# Patient Record
Sex: MALE | Race: WHITE | NOT HISPANIC OR LATINO | Employment: OTHER | URBAN - METROPOLITAN AREA
[De-identification: names, ages, dates, MRNs, and addresses within clinical notes are randomized per-mention and may not be internally consistent; named-entity substitution may affect disease eponyms.]

---

## 2023-02-17 PROBLEM — R55 SYNCOPE AND COLLAPSE: Status: ACTIVE | Noted: 2023-02-17

## 2023-02-17 PROBLEM — I49.5 SINUS NODE DYSFUNCTION (MULTI): Status: ACTIVE | Noted: 2023-02-17

## 2023-02-17 PROBLEM — I11.9 CARDIOMYOPATHY, HYPERTENSIVE (MULTI): Status: ACTIVE | Noted: 2023-02-17

## 2023-02-17 PROBLEM — I77.9 PERIPHERAL ARTERIAL OCCLUSIVE DISEASE (CMS-HCC): Status: ACTIVE | Noted: 2023-02-17

## 2023-02-17 PROBLEM — E78.1 PURE HYPERGLYCERIDEMIA: Status: ACTIVE | Noted: 2023-02-17

## 2023-02-17 PROBLEM — E03.9 HYPOTHYROIDISM: Status: ACTIVE | Noted: 2023-02-17

## 2023-02-17 PROBLEM — I43 CARDIOMYOPATHY, HYPERTENSIVE (MULTI): Status: ACTIVE | Noted: 2023-02-17

## 2023-02-17 PROBLEM — Z95.818 STATUS POST PLACEMENT OF IMPLANTABLE LOOP RECORDER: Status: ACTIVE | Noted: 2023-02-17

## 2023-02-17 PROBLEM — G62.9 PERIPHERAL NEUROPATHY: Status: ACTIVE | Noted: 2023-02-17

## 2023-02-17 PROBLEM — R16.1 SPLENOMEGALY: Status: ACTIVE | Noted: 2023-02-17

## 2023-02-17 PROBLEM — I10 ESSENTIAL HYPERTENSION: Status: ACTIVE | Noted: 2023-02-17

## 2023-02-17 PROBLEM — K21.9 ACID REFLUX: Status: ACTIVE | Noted: 2023-02-17

## 2023-02-17 PROBLEM — N28.9 RENAL INSUFFICIENCY: Status: ACTIVE | Noted: 2023-02-17

## 2023-02-17 PROBLEM — E11.9 TYPE 2 DIABETES MELLITUS WITHOUT COMPLICATION, WITHOUT LONG-TERM CURRENT USE OF INSULIN (MULTI): Status: ACTIVE | Noted: 2023-02-17

## 2023-02-17 PROBLEM — N32.81 OVERACTIVE BLADDER: Status: ACTIVE | Noted: 2023-02-17

## 2023-02-17 PROBLEM — R26.81 GAIT INSTABILITY: Status: ACTIVE | Noted: 2023-02-17

## 2023-02-17 PROBLEM — N40.0 PROSTATE ENLARGEMENT: Status: ACTIVE | Noted: 2023-02-17

## 2023-02-17 PROBLEM — I65.23 BILATERAL CAROTID ARTERY STENOSIS: Status: ACTIVE | Noted: 2023-02-17

## 2023-02-17 PROBLEM — F32.4 MAJOR DEPRESSIVE DISORDER WITH SINGLE EPISODE, IN PARTIAL REMISSION (CMS-HCC): Status: ACTIVE | Noted: 2023-02-17

## 2023-02-17 PROBLEM — I25.5 ISCHEMIC CARDIOMYOPATHY: Status: ACTIVE | Noted: 2023-02-17

## 2023-02-17 PROBLEM — I47.20 VENTRICULAR TACHYCARDIA (MULTI): Status: ACTIVE | Noted: 2023-02-17

## 2023-02-17 PROBLEM — F41.9 ANXIETY: Status: ACTIVE | Noted: 2023-02-17

## 2023-02-17 RX ORDER — TAMSULOSIN HYDROCHLORIDE 0.4 MG/1
1 CAPSULE ORAL DAILY
COMMUNITY
Start: 2018-10-09 | End: 2023-04-19

## 2023-02-17 RX ORDER — ATORVASTATIN CALCIUM 40 MG/1
1 TABLET, FILM COATED ORAL NIGHTLY
COMMUNITY
Start: 2018-10-23

## 2023-02-17 RX ORDER — CLOPIDOGREL BISULFATE 75 MG/1
1 TABLET ORAL DAILY
COMMUNITY
Start: 2018-09-19 | End: 2023-12-06

## 2023-02-17 RX ORDER — ESOMEPRAZOLE MAGNESIUM 40 MG/1
1 CAPSULE, DELAYED RELEASE ORAL DAILY PRN
COMMUNITY
Start: 2019-07-09 | End: 2023-04-19

## 2023-02-17 RX ORDER — CILOSTAZOL 50 MG/1
1 TABLET ORAL 2 TIMES DAILY
COMMUNITY
Start: 2021-05-12 | End: 2023-06-01

## 2023-02-17 RX ORDER — LISINOPRIL 5 MG/1
1 TABLET ORAL DAILY
COMMUNITY
Start: 2018-09-11 | End: 2023-04-03

## 2023-02-17 RX ORDER — LEVOTHYROXINE SODIUM 50 UG/1
1 TABLET ORAL DAILY
COMMUNITY
Start: 2018-09-11 | End: 2023-06-01

## 2023-02-17 RX ORDER — TOLTERODINE 4 MG/1
1 CAPSULE, EXTENDED RELEASE ORAL DAILY
COMMUNITY
Start: 2018-10-23 | End: 2023-04-10 | Stop reason: WASHOUT

## 2023-02-17 RX ORDER — FUROSEMIDE 20 MG/1
1 TABLET ORAL DAILY
COMMUNITY
Start: 2019-02-12 | End: 2023-04-10 | Stop reason: DRUGHIGH

## 2023-02-17 RX ORDER — METFORMIN HYDROCHLORIDE 500 MG/1
1 TABLET, FILM COATED, EXTENDED RELEASE ORAL DAILY
COMMUNITY
Start: 2022-09-29 | End: 2023-04-10 | Stop reason: WASHOUT

## 2023-02-17 RX ORDER — BLOOD SUGAR DIAGNOSTIC
STRIP MISCELLANEOUS 2 TIMES DAILY
COMMUNITY
Start: 2019-11-21

## 2023-02-17 RX ORDER — CARVEDILOL 3.12 MG/1
1 TABLET ORAL 2 TIMES DAILY
COMMUNITY
Start: 2019-03-13 | End: 2023-11-21 | Stop reason: SDUPTHER

## 2023-02-17 RX ORDER — CITALOPRAM 20 MG/1
1 TABLET, FILM COATED ORAL DAILY
COMMUNITY
Start: 2019-06-04 | End: 2023-06-01

## 2023-03-31 DIAGNOSIS — I10 ESSENTIAL HYPERTENSION: ICD-10-CM

## 2023-04-03 RX ORDER — LISINOPRIL 5 MG/1
TABLET ORAL
Qty: 90 TABLET | Refills: 3 | Status: SHIPPED | OUTPATIENT
Start: 2023-04-03 | End: 2023-12-06

## 2023-04-03 NOTE — TELEPHONE ENCOUNTER
Rx Refill Request     Name: Cb ALLEY Villa  :  [id number]   Medication Name:  Lisinopril 5 mg   Specific Pharmacy location:  [facility]   Date of last appointment:  01/10/2023  Date of next appointment:  4/10/2023   Best number to reach patient:  [phone]

## 2023-04-10 ENCOUNTER — OFFICE VISIT (OUTPATIENT)
Dept: PRIMARY CARE | Facility: CLINIC | Age: 88
End: 2023-04-10
Payer: COMMERCIAL

## 2023-04-10 VITALS
WEIGHT: 207.8 LBS | BODY MASS INDEX: 31.49 KG/M2 | OXYGEN SATURATION: 98 % | RESPIRATION RATE: 16 BRPM | DIASTOLIC BLOOD PRESSURE: 88 MMHG | HEIGHT: 68 IN | SYSTOLIC BLOOD PRESSURE: 130 MMHG | TEMPERATURE: 97.6 F | HEART RATE: 56 BPM

## 2023-04-10 DIAGNOSIS — I10 ESSENTIAL HYPERTENSION: ICD-10-CM

## 2023-04-10 DIAGNOSIS — F41.9 ANXIETY: ICD-10-CM

## 2023-04-10 DIAGNOSIS — E03.9 HYPOTHYROIDISM, UNSPECIFIED TYPE: ICD-10-CM

## 2023-04-10 DIAGNOSIS — F32.4 MAJOR DEPRESSIVE DISORDER WITH SINGLE EPISODE, IN PARTIAL REMISSION (CMS-HCC): ICD-10-CM

## 2023-04-10 DIAGNOSIS — K21.9 GASTROESOPHAGEAL REFLUX DISEASE WITHOUT ESOPHAGITIS: ICD-10-CM

## 2023-04-10 DIAGNOSIS — E11.9 TYPE 2 DIABETES MELLITUS WITHOUT COMPLICATION, WITHOUT LONG-TERM CURRENT USE OF INSULIN (MULTI): ICD-10-CM

## 2023-04-10 DIAGNOSIS — R60.9 PERIPHERAL EDEMA: ICD-10-CM

## 2023-04-10 DIAGNOSIS — Z00.00 ENCOUNTER FOR SUBSEQUENT ANNUAL WELLNESS VISIT (AWV) IN MEDICARE PATIENT: Primary | ICD-10-CM

## 2023-04-10 PROBLEM — R60.0 PERIPHERAL EDEMA: Status: ACTIVE | Noted: 2023-04-10

## 2023-04-10 PROCEDURE — 99214 OFFICE O/P EST MOD 30 MIN: CPT | Performed by: FAMILY MEDICINE

## 2023-04-10 PROCEDURE — 3075F SYST BP GE 130 - 139MM HG: CPT | Performed by: FAMILY MEDICINE

## 2023-04-10 PROCEDURE — G0439 PPPS, SUBSEQ VISIT: HCPCS | Performed by: FAMILY MEDICINE

## 2023-04-10 PROCEDURE — 1160F RVW MEDS BY RX/DR IN RCRD: CPT | Performed by: FAMILY MEDICINE

## 2023-04-10 PROCEDURE — 1159F MED LIST DOCD IN RCRD: CPT | Performed by: FAMILY MEDICINE

## 2023-04-10 PROCEDURE — 1170F FXNL STATUS ASSESSED: CPT | Performed by: FAMILY MEDICINE

## 2023-04-10 PROCEDURE — 3079F DIAST BP 80-89 MM HG: CPT | Performed by: FAMILY MEDICINE

## 2023-04-10 RX ORDER — ASPIRIN 81 MG/1
81 TABLET ORAL ONCE
COMMUNITY
Start: 2007-07-16 | End: 2023-04-10 | Stop reason: WASHOUT

## 2023-04-10 RX ORDER — FUROSEMIDE 40 MG/1
40 TABLET ORAL DAILY
Qty: 30 TABLET | Refills: 1 | Status: SHIPPED | OUTPATIENT
Start: 2023-04-10 | End: 2023-05-10 | Stop reason: SDUPTHER

## 2023-04-10 RX ORDER — OXYBUTYNIN CHLORIDE 10 MG/1
1 TABLET, EXTENDED RELEASE ORAL DAILY
COMMUNITY
Start: 2023-01-11 | End: 2023-06-01

## 2023-04-10 RX ORDER — POTASSIUM CHLORIDE 600 MG/1
8 TABLET, FILM COATED, EXTENDED RELEASE ORAL DAILY
Qty: 30 TABLET | Refills: 1 | Status: SHIPPED | OUTPATIENT
Start: 2023-04-10 | End: 2023-05-02

## 2023-04-10 ASSESSMENT — PATIENT HEALTH QUESTIONNAIRE - PHQ9
1. LITTLE INTEREST OR PLEASURE IN DOING THINGS: NOT AT ALL
SUM OF ALL RESPONSES TO PHQ9 QUESTIONS 1 AND 2: 0
2. FEELING DOWN, DEPRESSED OR HOPELESS: NOT AT ALL

## 2023-04-10 ASSESSMENT — ACTIVITIES OF DAILY LIVING (ADL)
DRESSING: INDEPENDENT
BATHING: INDEPENDENT
MANAGING_FINANCES: NEEDS ASSISTANCE
TAKING_MEDICATION: INDEPENDENT
DOING_HOUSEWORK: TOTAL CARE
GROCERY_SHOPPING: NEEDS ASSISTANCE

## 2023-04-10 ASSESSMENT — ENCOUNTER SYMPTOMS
HYPERTENSION: 1
OCCASIONAL FEELINGS OF UNSTEADINESS: 1
DEPRESSION: 0
LOSS OF SENSATION IN FEET: 1

## 2023-04-10 NOTE — PROGRESS NOTES
Subjective   Reason for Visit: Cb Driver is an 89 y.o. male here for a Medicare Wellness visit.     Past Medical, Surgical, and Family History reviewed and updated in chart.    Reviewed all medications by prescribing practitioner or clinical pharmacist (such as prescriptions, OTCs, herbal therapies and supplements) and documented in the medical record.    The patient's living will is unknown at this present time.   The patient's current code status is FULL CODE, does not want to linger on with machines.       Diabetes    Hypertension    GERD        Diabetes Mellitus:  Reports taking medication as advised and denies side effects. The patient's is checking sugars and reports values to be in expected ranges. Checking blood sugar routinely and denies any hypoglycemic events since last visit.     last eye exam: 2022  last foot exam: last week, Dr. Amador, Podiatry    Hypertension: reports no side effects to prescribed medication. The patient reports good compliance with the medication. The patient is trying to follow a low-salt diet.    Hypothyroidism: This patient is here today for review of hypothyroidism. Lab work is reviewed. The patient is taking thyroid replacement hormone without side effects.    Anxiety: Patient reports taking medication as prescribed and not excessively. Denies any side effects. Patient is reporting stability in condition.    GERD: The patient is not reporting any significant heartburn or indigestion. The patient is not getting any symptoms at night. Symptoms are stable so long as medications are taken.    Patient complains of lower extremity edema. He has had a few falls in past 3 weeks. He utilizes a walker for ambulation, he states that his right knee and right leg give out on him, which cause him to fall.     The patient denies SOB, dizziness, headaches, nausea, vomiting, constipation.    Patient Care Team:  Juan Jose Dempsey MD as PCP - General  Amanda Baez MD as PCP -  "[employer] PCP         Objective   Vitals:  /88   Pulse 56   Temp 36.4 °C (97.6 °F)   Resp 16   Ht 1.727 m (5' 8\")   Wt 94.3 kg (207 lb 12.8 oz)   SpO2 98%   BMI 31.60 kg/m²       Physical Exam  Vitals reviewed.   Constitutional:       Appearance: Normal appearance. He is obese.   Neck:      Vascular: No carotid bruit.   Cardiovascular:      Rate and Rhythm: Normal rate and regular rhythm.      Pulses: Normal pulses.      Heart sounds: Normal heart sounds.   Pulmonary:      Effort: Pulmonary effort is normal. No respiratory distress.      Breath sounds: Normal breath sounds. No wheezing.   Abdominal:      General: There is no distension.      Palpations: Abdomen is soft. There is no mass.      Tenderness: There is no abdominal tenderness. There is no right CVA tenderness, left CVA tenderness, guarding or rebound.   Musculoskeletal:      Cervical back: Normal range of motion and neck supple. No rigidity.      Right lower leg: Edema present.      Left lower leg: Edema present.   Lymphadenopathy:      Cervical: No cervical adenopathy.   Neurological:      Mental Status: He is alert.         Labs reviewed from:  Below are the patient's most recent values for Albumin, ALT, AST, BUN, Calcium, Chloride, Cholesterol, CO2, Creatinine, GFR, Glucose, HDL, Hematocrit, Hemoglobin, Hemoglobin A1C, LDL, Magnesium, Phosphorus, Platelets, Potassium, PSA, Sodium, Triglycerides, and WBC.   Lab Results   Component Value Date    ALBUMIN 3.1 (L) 03/06/2023    ALT 7 (L) 03/06/2023    AST 10 03/06/2023    BUN 30 (H) 03/06/2023    CALCIUM 8.3 (L) 03/06/2023     (H) 03/06/2023    CHOL 111 01/19/2023    CO2 22 03/06/2023    CREATININE 1.77 (H) 03/06/2023    HDL 52.3 01/19/2023    HCT 34.6 (L) 03/06/2023    HGB 11.4 (L) 03/06/2023    HGBA1C 6.2 (A) 01/19/2023    MG 1.80 11/10/2019    PHOS 2.9 11/10/2019     03/06/2023    K 4.1 03/06/2023     03/06/2023    TRIG 110 01/19/2023    WBC 5.7 03/06/2023 "         Assessment/Plan   Problem List Items Addressed This Visit          Circulatory    Essential hypertension    Current Assessment & Plan      Is well controlled, continue with current medications.              Digestive    Acid reflux    Current Assessment & Plan      Is well controlled, continue with current medications.              Musculoskeletal    Peripheral edema    Relevant Medications    furosemide (Lasix) 40 mg tablet    potassium chloride CR (Klor-Con) 8 mEq ER tablet       Endocrine/Metabolic    Hypothyroidism    Current Assessment & Plan      Is well controlled, continue with current medications.          Type 2 diabetes mellitus without complication, without long-term current use of insulin (CMS/[facility])    Current Assessment & Plan      Is stable, continue with current treatment.             Other    Anxiety    Current Assessment & Plan      Is well controlled, continue with current medications.           Major depressive disorder with single episode, in partial remission (CMS/[facility])    Current Assessment & Plan      Is stable, continue with current treatment.          Encounter for subsequent annual wellness visit (AWV) in Medicare patient - Primary    Current Assessment & Plan     Medicare wellness visit done, patient is in satisfactory living circumstances where the patient's needs are met.  This patient is advised to develop or update their living will and provide us a copy for the chart.             Follow up in:  1 month with labs day of     Mile Attestation  By signing my name below, IKarlie Scribe   attest that this documentation has been prepared under the direction and in the presence of Juan Jose Dempsey MD.

## 2023-04-10 NOTE — ASSESSMENT & PLAN NOTE
Medicare wellness visit done, patient is in satisfactory living circumstances where the patient's needs are met.  This patient is advised to develop or update their living will and provide us a copy for the chart.

## 2023-04-10 NOTE — PROGRESS NOTES
Subjective   Reason for Visit: Cb Dirver is an 89 y.o. male here for a Medicare Wellness visit.     Past Medical, Surgical, and Family History reviewed and updated in chart.    Reviewed all medications by prescribing practitioner or clinical pharmacist (such as prescriptions, OTCs, herbal therapies and supplements) and documented in the medical record.    The patient's living will is unknown at this present time.   The patient's current code status is FULL CODE, does not want to linger on with machines.       HPI    Diabetes Mellitus:  Reports taking medication as advised and denies side effects. The patient's is checking sugars and reports values to be in expected ranges. Checking blood sugar routinely and denies any hypoglycemic events since last visit.     last eye exam: 2022  last foot exam: last week, Dr. Amador, Podiatry    Hypertension: reports no side effects to prescribed medication. The patient reports good compliance with the medication. The patient is trying to follow a low-salt diet.    Hypothyroidism: This patient is here today for review of hypothyroidism. Lab work is reviewed. The patient is taking thyroid replacement hormone without side effects.    Anxiety: Patient reports taking medication as prescribed and not excessively. Denies any side effects. Patient is reporting stability in condition.    GERD: The patient is not reporting any significant heartburn or indigestion. The patient is not getting any symptoms at night. Symptoms are stable so long as medications are taken.    Patient complains of lower extremity edema. He has had a few falls in past 3 weeks. He utilizes a walker for ambulation, he states that his right knee and right leg give out on him, which cause him to fall.     The patient denies SOB, dizziness, headaches, nausea, vomiting, constipation.    Patient Care Team:  Juan Jose Dempsey MD as PCP - General  Amanda Baez MD as PCP - [employer] PCP  "        Objective   Vitals:  /88   Pulse 56   Temp 36.4 °C (97.6 °F)   Resp 16   Ht 1.727 m (5' 8\")   Wt 94.3 kg (207 lb 12.8 oz)   SpO2 98%   BMI 31.60 kg/m²       Physical Exam  Vitals reviewed.   Constitutional:       Appearance: Normal appearance. He is obese.   Neck:      Vascular: No carotid bruit.   Cardiovascular:      Rate and Rhythm: Normal rate and regular rhythm.      Pulses: Normal pulses.      Heart sounds: Normal heart sounds.   Pulmonary:      Effort: Pulmonary effort is normal. No respiratory distress.      Breath sounds: Normal breath sounds. No wheezing.   Abdominal:      General: There is no distension.      Palpations: Abdomen is soft. There is no mass.      Tenderness: There is no abdominal tenderness. There is no right CVA tenderness, left CVA tenderness, guarding or rebound.   Musculoskeletal:      Cervical back: Normal range of motion and neck supple. No rigidity.      Right lower leg: Edema present.      Left lower leg: Edema present.   Lymphadenopathy:      Cervical: No cervical adenopathy.   Neurological:      Mental Status: He is alert.         Labs reviewed from:  Below are the patient's most recent values for Albumin, ALT, AST, BUN, Calcium, Chloride, Cholesterol, CO2, Creatinine, GFR, Glucose, HDL, Hematocrit, Hemoglobin, Hemoglobin A1C, LDL, Magnesium, Phosphorus, Platelets, Potassium, PSA, Sodium, Triglycerides, and WBC.   Lab Results   Component Value Date    ALBUMIN 3.1 (L) 03/06/2023    ALT 7 (L) 03/06/2023    AST 10 03/06/2023    BUN 30 (H) 03/06/2023    CALCIUM 8.3 (L) 03/06/2023     (H) 03/06/2023    CHOL 111 01/19/2023    CO2 22 03/06/2023    CREATININE 1.77 (H) 03/06/2023    HDL 52.3 01/19/2023    HCT 34.6 (L) 03/06/2023    HGB 11.4 (L) 03/06/2023    HGBA1C 6.2 (A) 01/19/2023    MG 1.80 11/10/2019    PHOS 2.9 11/10/2019     03/06/2023    K 4.1 03/06/2023     03/06/2023    TRIG 110 01/19/2023    WBC 5.7 03/06/2023         Assessment/Plan "   Problem List Items Addressed This Visit          Circulatory    Essential hypertension    Current Assessment & Plan      Is well controlled, continue with current medications.              Digestive    Acid reflux    Current Assessment & Plan      Is well controlled, continue with current medications.              Endocrine/Metabolic    Hypothyroidism    Current Assessment & Plan      Is well controlled, continue with current medications.          Type 2 diabetes mellitus without complication, without long-term current use of insulin (CMS/[facility])    Current Assessment & Plan      Is stable, continue with current treatment.             Other    Anxiety    Current Assessment & Plan      Is well controlled, continue with current medications.           Major depressive disorder with single episode, in partial remission (CMS/[facility])    Current Assessment & Plan      Is stable, continue with current treatment.          Encounter for subsequent annual wellness visit (AWV) in Medicare patient - Primary    Current Assessment & Plan     Medicare wellness visit done, patient is in satisfactory living circumstances where the patient's needs are met.  This patient is advised to develop or update their living will and provide us a copy for the chart.             Follow up in:  1 month with labs day of     Scribe Attestation  By signing my name below, IKarlie Scribe   attest that this documentation has been prepared under the direction and in the presence of Juan Jose Dempsey MD.

## 2023-04-10 NOTE — PROGRESS NOTES
"Subjective   Reason for Visit: Cb Driver is an 89 y.o. male here for a Medicare Wellness visit.  He would like to be resuscitated but he does not want to live a vegetative state on a machine.    Past Medical, Surgical, and Family History reviewed and updated in chart.    Reviewed all medications by prescribing practitioner or clinical pharmacist (such as prescriptions, OTCs, herbal therapies and supplements) and documented in the medical record.   Diabetes Mellitus: Reports taking medication as advised and denies side effects. The patient's is checking sugars and reports values to be in expected ranges. Checking blood sugar routinely and denies any hypoglycemic events since last visit.   Hypertension, reports no side effects to prescribed medication. The patient reports good compliance with the medication. The patient is trying to follow a low-salt diet.  Hyperlipidemia: Reports taking medication as advised and without side effects.  The patient is reporting no leg cramping in particular. The patient is trying to follow a low-fat diet.  Hypothyroidism: This patient is here today for review of hypothyroidism. Lab work is reviewed. The patient is taking thyroid replacement hormone without side effects.  Anxiety: Reports taking medication as needed but not excessively and denies side effects. The patient is not reporting any high levels of stress or difficulty sleeping. The patient is not having any panic attacks. The patient is able to function in activities of daily life.  Gastroesophageal reflux disease: This patient is not reporting any significant heartburn or indigestion. The patient is not getting symptoms to the night. Symptoms are stable so long as medications are taken.  Patient Care Team:  Juan Jose Dempsey MD as PCP - [facility]  Amanda Baez MD as PCP - [employer] PCP       Objective   Vitals:  /88   Pulse 56   Temp 36.4 °C (97.6 °F)   Resp 16   Ht 1.727 m (5' 8\")   " Wt 94.3 kg (207 lb 12.8 oz)   SpO2 98%   BMI 31.60 kg/m²       Physical Exam  Vitals reviewed.   Constitutional:       Appearance: Normal appearance.   Neck:      Vascular: No carotid bruit.   Cardiovascular:      Rate and Rhythm: Normal rate and regular rhythm.      Pulses: Normal pulses.      Heart sounds: Normal heart sounds.   Pulmonary:      Effort: Pulmonary effort is normal. No respiratory distress.      Breath sounds: Normal breath sounds. No wheezing.   Abdominal:      General: There is no distension.      Palpations: Abdomen is soft. There is no mass.      Tenderness: There is no abdominal tenderness. There is no right CVA tenderness, left CVA tenderness, guarding or rebound.   Musculoskeletal:      Cervical back: Normal range of motion and neck supple. No rigidity.      Right lower leg: Edema present.      Left lower leg: Edema present.   Lymphadenopathy:      Cervical: No cervical adenopathy.   Neurological:      Mental Status: He is alert.     No recent labs to review.    Assessment/Plan   Problem List Items Addressed This Visit          Circulatory    Essential hypertension    Current Assessment & Plan      Is well controlled, continue with current medications.              Digestive    Acid reflux    Current Assessment & Plan      Is well controlled, continue with current medications.              Endocrine/Metabolic    Hypothyroidism    Current Assessment & Plan      Is well controlled, continue with current medications.          Type 2 diabetes mellitus without complication, without long-term current use of insulin (CMS/[facility])    Current Assessment & Plan      Is stable, continue with current treatment.             Other    Anxiety    Current Assessment & Plan      Is well controlled, continue with current medications.           Major depressive disorder with single episode, in partial remission (CMS/[facility])    Current Assessment & Plan      Is stable, continue with current treatment.           Encounter for subsequent annual wellness visit (AWV) in Medicare patient - Primary    Current Assessment & Plan     Medicare wellness visit done, patient is in satisfactory living circumstances where the patient's needs are met.  This patient is advised to develop or update their living will and provide us a copy for the chart.           Other Visit Diagnoses       Peripheral edema        Relevant Medications    furosemide (Lasix) 40 mg tablet    potassium chloride CR (Klor-Con) 8 mEq ER tablet    Routine general medical examination at health care facility

## 2023-04-19 DIAGNOSIS — K21.9 GASTROESOPHAGEAL REFLUX DISEASE WITHOUT ESOPHAGITIS: ICD-10-CM

## 2023-04-19 DIAGNOSIS — N32.81 OVERACTIVE BLADDER: ICD-10-CM

## 2023-04-19 DIAGNOSIS — N40.0 PROSTATE ENLARGEMENT: ICD-10-CM

## 2023-04-19 RX ORDER — TAMSULOSIN HYDROCHLORIDE 0.4 MG/1
CAPSULE ORAL
Qty: 90 CAPSULE | Refills: 1 | Status: SHIPPED | OUTPATIENT
Start: 2023-04-19 | End: 2023-11-02

## 2023-04-19 RX ORDER — TOLTERODINE 4 MG/1
CAPSULE, EXTENDED RELEASE ORAL
Qty: 90 CAPSULE | Refills: 1 | Status: SHIPPED | OUTPATIENT
Start: 2023-04-19

## 2023-04-19 RX ORDER — ESOMEPRAZOLE MAGNESIUM 40 MG/1
CAPSULE, DELAYED RELEASE ORAL
Qty: 90 CAPSULE | Refills: 1 | Status: SHIPPED | OUTPATIENT
Start: 2023-04-19 | End: 2023-10-04

## 2023-04-20 ENCOUNTER — TELEPHONE (OUTPATIENT)
Dept: PRIMARY CARE | Facility: CLINIC | Age: 88
End: 2023-04-20

## 2023-04-20 NOTE — TELEPHONE ENCOUNTER
Silver with [facility] called for pt requesting verbal orders for skilled nursing pt and ot. Stated aid of care reported swelling in bilateral lower extremities. Please advise. Contact info for silver is [phone]

## 2023-04-29 DIAGNOSIS — R60.9 PERIPHERAL EDEMA: ICD-10-CM

## 2023-05-02 RX ORDER — POTASSIUM CHLORIDE 600 MG/1
CAPSULE, EXTENDED RELEASE ORAL
Qty: 30 CAPSULE | Refills: 1 | Status: SHIPPED | OUTPATIENT
Start: 2023-05-02 | End: 2023-05-10 | Stop reason: SDUPTHER

## 2023-05-02 NOTE — TELEPHONE ENCOUNTER
Rx Refill Request     Name: Cb Driver    Date of last appointment:  4/10/2023   Date of next appointment:  5/10/2023   Best number to reach patient:  [phone]

## 2023-05-10 ENCOUNTER — OFFICE VISIT (OUTPATIENT)
Dept: PRIMARY CARE | Facility: CLINIC | Age: 88
End: 2023-05-10
Payer: COMMERCIAL

## 2023-05-10 VITALS
TEMPERATURE: 97.8 F | BODY MASS INDEX: 30.65 KG/M2 | DIASTOLIC BLOOD PRESSURE: 44 MMHG | WEIGHT: 202.2 LBS | HEIGHT: 68 IN | OXYGEN SATURATION: 98 % | HEART RATE: 78 BPM | RESPIRATION RATE: 14 BRPM | SYSTOLIC BLOOD PRESSURE: 124 MMHG

## 2023-05-10 DIAGNOSIS — M25.561 ARTHRALGIA OF RIGHT KNEE: ICD-10-CM

## 2023-05-10 DIAGNOSIS — R60.0 BILATERAL LEG EDEMA: Primary | ICD-10-CM

## 2023-05-10 DIAGNOSIS — R60.9 PERIPHERAL EDEMA: ICD-10-CM

## 2023-05-10 LAB
ALBUMIN (G/DL) IN SER/PLAS: 3.5 G/DL (ref 3.4–5)
ANION GAP IN SER/PLAS: 11 MMOL/L (ref 10–20)
CALCIUM (MG/DL) IN SER/PLAS: 8.7 MG/DL (ref 8.6–10.3)
CARBON DIOXIDE, TOTAL (MMOL/L) IN SER/PLAS: 25 MMOL/L (ref 21–32)
CHLORIDE (MMOL/L) IN SER/PLAS: 108 MMOL/L (ref 98–107)
CREATININE (MG/DL) IN SER/PLAS: 1.88 MG/DL (ref 0.5–1.3)
GFR MALE: 33 ML/MIN/1.73M2
GLUCOSE (MG/DL) IN SER/PLAS: 125 MG/DL (ref 74–99)
PHOSPHATE (MG/DL) IN SER/PLAS: 3.3 MG/DL (ref 2.5–4.9)
POTASSIUM (MMOL/L) IN SER/PLAS: 4.2 MMOL/L (ref 3.5–5.3)
SODIUM (MMOL/L) IN SER/PLAS: 140 MMOL/L (ref 136–145)
UREA NITROGEN (MG/DL) IN SER/PLAS: 37 MG/DL (ref 6–23)

## 2023-05-10 PROCEDURE — 3078F DIAST BP <80 MM HG: CPT | Performed by: FAMILY MEDICINE

## 2023-05-10 PROCEDURE — 3074F SYST BP LT 130 MM HG: CPT | Performed by: FAMILY MEDICINE

## 2023-05-10 PROCEDURE — 99213 OFFICE O/P EST LOW 20 MIN: CPT | Performed by: FAMILY MEDICINE

## 2023-05-10 PROCEDURE — 1160F RVW MEDS BY RX/DR IN RCRD: CPT | Performed by: FAMILY MEDICINE

## 2023-05-10 PROCEDURE — 1159F MED LIST DOCD IN RCRD: CPT | Performed by: FAMILY MEDICINE

## 2023-05-10 RX ORDER — DICLOFENAC SODIUM 10 MG/G
4 GEL TOPICAL 4 TIMES DAILY PRN
Qty: 120 G | Refills: 1 | Status: SHIPPED | OUTPATIENT
Start: 2023-05-10 | End: 2023-06-01

## 2023-05-10 RX ORDER — POTASSIUM CHLORIDE 600 MG/1
8 CAPSULE, EXTENDED RELEASE ORAL DAILY
Qty: 30 CAPSULE | Refills: 1 | Status: SHIPPED | OUTPATIENT
Start: 2023-05-10 | End: 2023-08-22

## 2023-05-10 RX ORDER — FUROSEMIDE 40 MG/1
40 TABLET ORAL DAILY
Qty: 30 TABLET | Refills: 1 | Status: SHIPPED | OUTPATIENT
Start: 2023-05-10

## 2023-05-10 NOTE — PROGRESS NOTES
"Subjective   Patient ID:  Cb Driver is a 89 y.o. male patient who presents today for Leg Swelling    Past Medical, Surgical, and Family History reviewed and updated in chart.     Reviewed all medications by prescribing practitioner or clinical pharmacist (such as prescriptions, OTCs, herbal therapies and supplements) and documented in the medical record.     Bilateral Leg Edema  At last appointment patient complains of swelling in his legs. He was put on medication and it did not help. He was seen by cardiologist Dr. Gaines and the medication was increased and it still has not helped. Patient notes his right leg is more edematous.     He has been trying OTC gels for arthritis in his right knee.    The patient denies having the following symptoms: chest pain, chest pressure, fever, chills, N/V/D, constipation, dizziness, headaches, SOB.        Patient Care Team:  Juan Jose Dempsey MD as PCP - General  Amanda Baez MD as PCP - [employer] PCP        Objective   Vitals:  BP (!) 124/44   Pulse 78   Temp 36.6 °C (97.8 °F)   Resp 14   Ht 1.727 m (5' 8\")   Wt 91.7 kg (202 lb 3.2 oz)   SpO2 98%   BMI 30.74 kg/m²     Labs reviewed from:   Below is the patient's most recent value for Albumin, ALT, AST, BUN, Calcium, Chloride, Cholesterol, CO2, Creatinine, GFR, Glucose, HDL, Hematocrit, Hemoglobin, Hemoglobin A1C, LDL, Magnesium, Phosphorus, Platelets, Potassium, PSA, Sodium, Triglycerides, and WBC.   Lab Results   Component Value Date    ALBUMIN 3.1 (L) 03/06/2023    ALT 7 (L) 03/06/2023    AST 10 03/06/2023    BUN 30 (H) 03/06/2023    CALCIUM 8.3 (L) 03/06/2023     (H) 03/06/2023    CHOL 111 01/19/2023    CO2 22 03/06/2023    CREATININE 1.77 (H) 03/06/2023    HDL 52.3 01/19/2023    HCT 34.6 (L) 03/06/2023    HGB 11.4 (L) 03/06/2023    HGBA1C 6.2 (A) 01/19/2023    MG 1.80 11/10/2019    PHOS 2.9 11/10/2019     03/06/2023    K 4.1 03/06/2023     03/06/2023    TRIG 110 " 01/19/2023    WBC 5.7 03/06/2023         Physical Exam  Vitals reviewed.   Constitutional:       Appearance: Normal appearance.   HENT:      Right Ear: Tympanic membrane and ear canal normal.      Left Ear: Tympanic membrane and ear canal normal.      Mouth/Throat:      Pharynx: Oropharynx is clear.   Eyes:      Extraocular Movements: Extraocular movements intact.      Conjunctiva/sclera: Conjunctivae normal.      Pupils: Pupils are equal, round, and reactive to light.   Cardiovascular:      Rate and Rhythm: Normal rate and regular rhythm.      Heart sounds: Normal heart sounds.   Pulmonary:      Effort: Pulmonary effort is normal. No respiratory distress.      Breath sounds: Normal breath sounds.   Abdominal:      General: There is no distension.      Palpations: There is no mass.      Tenderness: There is no abdominal tenderness. There is no guarding or rebound.      Hernia: No hernia is present.   Musculoskeletal:      Cervical back: Neck supple.      Comments: Right knee tenderness in the joint lines. Slight swelling noted. Bilateral edema of extremities.   Skin:     General: Skin is warm and dry.   Neurological:      Mental Status: He is alert and oriented to person, place, and time.                 Assessment/Plan   Problem List Items Addressed This Visit       Bilateral leg edema - Primary    Current Assessment & Plan      This condition is poorly controlled, therapeutic changes necessary.                  Follow up in: 2 months without labs prior.      Scribe Attestation  By signing my name below, Carlee GUTIERREZ , Mile   attest that this documentation has been prepared under the direction and in the presence of Juan Jose Dempsey MD.

## 2023-05-23 LAB
ALBUMIN (G/DL) IN SER/PLAS: 3.5 G/DL (ref 3.4–5)
ANION GAP IN SER/PLAS: 13 MMOL/L (ref 10–20)
CALCIUM (MG/DL) IN SER/PLAS: 8.9 MG/DL (ref 8.6–10.3)
CARBON DIOXIDE, TOTAL (MMOL/L) IN SER/PLAS: 21 MMOL/L (ref 21–32)
CHLORIDE (MMOL/L) IN SER/PLAS: 107 MMOL/L (ref 98–107)
CREATININE (MG/DL) IN SER/PLAS: 2.53 MG/DL (ref 0.5–1.3)
GFR MALE: 23 ML/MIN/1.73M2
GLUCOSE (MG/DL) IN SER/PLAS: 78 MG/DL (ref 74–99)
PHOSPHATE (MG/DL) IN SER/PLAS: 3.3 MG/DL (ref 2.5–4.9)
POTASSIUM (MMOL/L) IN SER/PLAS: 4.5 MMOL/L (ref 3.5–5.3)
SODIUM (MMOL/L) IN SER/PLAS: 136 MMOL/L (ref 136–145)
UREA NITROGEN (MG/DL) IN SER/PLAS: 70 MG/DL (ref 6–23)

## 2023-06-01 DIAGNOSIS — M25.561 ARTHRALGIA OF RIGHT KNEE: ICD-10-CM

## 2023-06-01 RX ORDER — DICLOFENAC SODIUM 10 MG/G
GEL TOPICAL
Qty: 120 G | Refills: 1 | Status: SHIPPED | OUTPATIENT
Start: 2023-06-01

## 2023-07-12 ENCOUNTER — APPOINTMENT (OUTPATIENT)
Dept: PRIMARY CARE | Facility: CLINIC | Age: 88
End: 2023-07-12
Payer: COMMERCIAL

## 2023-07-20 ENCOUNTER — TELEPHONE (OUTPATIENT)
Dept: PRIMARY CARE | Facility: CLINIC | Age: 88
End: 2023-07-20

## 2023-07-20 ENCOUNTER — LAB (OUTPATIENT)
Dept: LAB | Facility: LAB | Age: 88
End: 2023-07-20
Payer: COMMERCIAL

## 2023-07-20 ENCOUNTER — OFFICE VISIT (OUTPATIENT)
Dept: PRIMARY CARE | Facility: CLINIC | Age: 88
End: 2023-07-20
Payer: COMMERCIAL

## 2023-07-20 VITALS
BODY MASS INDEX: 29.55 KG/M2 | HEART RATE: 63 BPM | HEIGHT: 68 IN | WEIGHT: 195 LBS | SYSTOLIC BLOOD PRESSURE: 100 MMHG | OXYGEN SATURATION: 93 % | TEMPERATURE: 97.8 F | RESPIRATION RATE: 18 BRPM | DIASTOLIC BLOOD PRESSURE: 60 MMHG

## 2023-07-20 DIAGNOSIS — E11.9 TYPE 2 DIABETES MELLITUS WITHOUT COMPLICATION, WITHOUT LONG-TERM CURRENT USE OF INSULIN (MULTI): ICD-10-CM

## 2023-07-20 DIAGNOSIS — C68.0 MALIGNANT NEOPLASM OF URETHRA (MULTI): Primary | ICD-10-CM

## 2023-07-20 DIAGNOSIS — R26.81 GAIT INSTABILITY: ICD-10-CM

## 2023-07-20 DIAGNOSIS — R60.0 BILATERAL LEG EDEMA: ICD-10-CM

## 2023-07-20 DIAGNOSIS — I10 ESSENTIAL HYPERTENSION: ICD-10-CM

## 2023-07-20 DIAGNOSIS — I77.9 PERIPHERAL ARTERIAL OCCLUSIVE DISEASE (CMS-HCC): ICD-10-CM

## 2023-07-20 DIAGNOSIS — E78.1 PURE HYPERGLYCERIDEMIA: ICD-10-CM

## 2023-07-20 DIAGNOSIS — R29.898 WEAKNESS OF EXTREMITY: ICD-10-CM

## 2023-07-20 DIAGNOSIS — M25.561 ARTHRALGIA OF RIGHT KNEE: ICD-10-CM

## 2023-07-20 DIAGNOSIS — I49.5 SINUS NODE DYSFUNCTION (MULTI): ICD-10-CM

## 2023-07-20 LAB
ALANINE AMINOTRANSFERASE (SGPT) (U/L) IN SER/PLAS: 9 U/L (ref 10–52)
ALBUMIN (G/DL) IN SER/PLAS: 3.5 G/DL (ref 3.4–5)
ALKALINE PHOSPHATASE (U/L) IN SER/PLAS: 149 U/L (ref 33–136)
ANION GAP IN SER/PLAS: 11 MMOL/L (ref 10–20)
ASPARTATE AMINOTRANSFERASE (SGOT) (U/L) IN SER/PLAS: 11 U/L (ref 9–39)
BASOPHILS (10*3/UL) IN BLOOD BY AUTOMATED COUNT: 0.04 X10E9/L (ref 0–0.1)
BASOPHILS/100 LEUKOCYTES IN BLOOD BY AUTOMATED COUNT: 0.8 % (ref 0–2)
BILIRUBIN TOTAL (MG/DL) IN SER/PLAS: 1.2 MG/DL (ref 0–1.2)
CALCIUM (MG/DL) IN SER/PLAS: 8.9 MG/DL (ref 8.6–10.3)
CARBON DIOXIDE, TOTAL (MMOL/L) IN SER/PLAS: 20 MMOL/L (ref 21–32)
CHLORIDE (MMOL/L) IN SER/PLAS: 112 MMOL/L (ref 98–107)
CHOLESTEROL (MG/DL) IN SER/PLAS: 93 MG/DL (ref 0–199)
CHOLESTEROL IN HDL (MG/DL) IN SER/PLAS: 39.4 MG/DL
CHOLESTEROL/HDL RATIO: 2.4
CREATININE (MG/DL) IN SER/PLAS: 1.54 MG/DL (ref 0.5–1.3)
EOSINOPHILS (10*3/UL) IN BLOOD BY AUTOMATED COUNT: 0.24 X10E9/L (ref 0–0.4)
EOSINOPHILS/100 LEUKOCYTES IN BLOOD BY AUTOMATED COUNT: 5.1 % (ref 0–6)
ERYTHROCYTE DISTRIBUTION WIDTH (RATIO) BY AUTOMATED COUNT: 15.5 % (ref 11.5–14.5)
ERYTHROCYTE MEAN CORPUSCULAR HEMOGLOBIN CONCENTRATION (G/DL) BY AUTOMATED: 32.7 G/DL (ref 32–36)
ERYTHROCYTE MEAN CORPUSCULAR VOLUME (FL) BY AUTOMATED COUNT: 93 FL (ref 80–100)
ERYTHROCYTES (10*6/UL) IN BLOOD BY AUTOMATED COUNT: 4.12 X10E12/L (ref 4.5–5.9)
ESTIMATED AVERAGE GLUCOSE FOR HBA1C: 140 MG/DL
GFR MALE: 42 ML/MIN/1.73M2
GLUCOSE (MG/DL) IN SER/PLAS: 101 MG/DL (ref 74–99)
HEMATOCRIT (%) IN BLOOD BY AUTOMATED COUNT: 38.2 % (ref 41–52)
HEMOGLOBIN (G/DL) IN BLOOD: 12.5 G/DL (ref 13.5–17.5)
HEMOGLOBIN A1C/HEMOGLOBIN TOTAL IN BLOOD: 6.5 %
IMMATURE GRANULOCYTES/100 LEUKOCYTES IN BLOOD BY AUTOMATED COUNT: 0.2 % (ref 0–0.9)
LDL: 44 MG/DL (ref 0–99)
LEUKOCYTES (10*3/UL) IN BLOOD BY AUTOMATED COUNT: 4.8 X10E9/L (ref 4.4–11.3)
LYMPHOCYTES (10*3/UL) IN BLOOD BY AUTOMATED COUNT: 1.78 X10E9/L (ref 0.8–3)
LYMPHOCYTES/100 LEUKOCYTES IN BLOOD BY AUTOMATED COUNT: 37.5 % (ref 13–44)
MONOCYTES (10*3/UL) IN BLOOD BY AUTOMATED COUNT: 0.27 X10E9/L (ref 0.05–0.8)
MONOCYTES/100 LEUKOCYTES IN BLOOD BY AUTOMATED COUNT: 5.7 % (ref 2–10)
NEUTROPHILS (10*3/UL) IN BLOOD BY AUTOMATED COUNT: 2.41 X10E9/L (ref 1.6–5.5)
NEUTROPHILS/100 LEUKOCYTES IN BLOOD BY AUTOMATED COUNT: 50.7 % (ref 40–80)
PLATELETS (10*3/UL) IN BLOOD AUTOMATED COUNT: 156 X10E9/L (ref 150–450)
POTASSIUM (MMOL/L) IN SER/PLAS: 4.2 MMOL/L (ref 3.5–5.3)
PROTEIN TOTAL: 5.8 G/DL (ref 6.4–8.2)
SODIUM (MMOL/L) IN SER/PLAS: 139 MMOL/L (ref 136–145)
TRIGLYCERIDE (MG/DL) IN SER/PLAS: 46 MG/DL (ref 0–149)
UREA NITROGEN (MG/DL) IN SER/PLAS: 28 MG/DL (ref 6–23)
VLDL: 9 MG/DL (ref 0–40)

## 2023-07-20 PROCEDURE — 85025 COMPLETE CBC W/AUTO DIFF WBC: CPT

## 2023-07-20 PROCEDURE — 80061 LIPID PANEL: CPT

## 2023-07-20 PROCEDURE — 3074F SYST BP LT 130 MM HG: CPT | Performed by: FAMILY MEDICINE

## 2023-07-20 PROCEDURE — 99214 OFFICE O/P EST MOD 30 MIN: CPT | Performed by: FAMILY MEDICINE

## 2023-07-20 PROCEDURE — 36415 COLL VENOUS BLD VENIPUNCTURE: CPT

## 2023-07-20 PROCEDURE — 83036 HEMOGLOBIN GLYCOSYLATED A1C: CPT

## 2023-07-20 PROCEDURE — 3078F DIAST BP <80 MM HG: CPT | Performed by: FAMILY MEDICINE

## 2023-07-20 PROCEDURE — 1159F MED LIST DOCD IN RCRD: CPT | Performed by: FAMILY MEDICINE

## 2023-07-20 PROCEDURE — 1160F RVW MEDS BY RX/DR IN RCRD: CPT | Performed by: FAMILY MEDICINE

## 2023-07-20 PROCEDURE — 80053 COMPREHEN METABOLIC PANEL: CPT

## 2023-07-20 NOTE — PROGRESS NOTES
"Subjective   Patient ID:  Cb Driver is a 89 y.o. male patient who presents today for Edema    Diarrhea despite no metformin.  Patient is not complaining of symptoms of high or low blood sugars.  Hypertension, reports no side effects to prescribed medication. The patient reports good compliance with the medication. The patient is trying to follow a low-salt diet.  This patient does have some peripheral arterial disease although he is relatively asymptomatic.  He is not as active as previously he was.  Hypertriglyceridemia: Reports taking medication as advised and without side effects.  The patient is reporting no leg cramping in particular. The patient is trying to follow a low-fat diet.  This patient did suffer from sinus node dysfunction but that has been stabilized.   Diabetes Mellitus: Reports taking medication as advised and denies side effects. The patient's is checking sugars and reports values to be in expected ranges. Checking blood sugar routinely and denies any hypoglycemic events since last visit.     Past Medical, Surgical, and Family History reviewed and updated in chart.     Reviewed all medications by prescribing practitioner or clinical pharmacist (such as prescriptions, OTCs, herbal therapies and supplements) and documented in the medical record.     No Known Allergies    The patient denies having the following symptoms: chest pain, chest pressure, fever, chills, N/V/D, constipation, dizziness, headaches, SOB.    Patient Care Team:  Juan Jose Dempsey MD as PCP - General  Juan Jose Dempsey MD as PCP - [employer] PCP    Objective   Vitals:  /60 (BP Location: Right arm, Patient Position: Sitting, BP Cuff Size: Large adult)   Pulse 63   Temp 36.6 °C (97.8 °F)   Resp 18   Ht 1.727 m (5' 8\")   Wt 88.5 kg (195 lb)   SpO2 93%   BMI 29.65 kg/m²     Physical Exam  Vitals reviewed.   Constitutional:       Appearance: Normal appearance.   Neck:      Vascular: No carotid bruit. "   Cardiovascular:      Rate and Rhythm: Normal rate and regular rhythm.      Heart sounds: Normal heart sounds.      Comments: Pulses diminished in the legs.  Pulmonary:      Effort: Pulmonary effort is normal. No respiratory distress.      Breath sounds: Normal breath sounds. No wheezing.   Abdominal:      General: There is no distension.      Palpations: Abdomen is soft. There is no mass.      Tenderness: There is no abdominal tenderness. There is no right CVA tenderness, left CVA tenderness, guarding or rebound.   Musculoskeletal:      Cervical back: Normal range of motion and neck supple. No rigidity.      Right lower leg: No edema.      Left lower leg: No edema.   Lymphadenopathy:      Cervical: No cervical adenopathy.   Neurological:      Mental Status: He is alert.     Labs ordered for 7/20/2023:   CMP, CBC, Lipid, HgA1C.    Assessment/Plan   Problem List Items Addressed This Visit       Essential hypertension    Current Assessment & Plan      Is well controlled, continue with current medications.         Relevant Orders    CBC and Auto Differential (Completed)    Comprehensive Metabolic Panel (Completed)    Gait instability    Peripheral arterial occlusive disease (CMS/HCC)    Current Assessment & Plan      Is stable, continue with current treatment.         Pure hyperglyceridemia    Current Assessment & Plan     Is clinically stable so we will continue with current medications and lab work to confirm status ordered.         Relevant Orders    Lipid Panel (Completed)    Type 2 diabetes mellitus without complication, without long-term current use of insulin (CMS/HCC)    Current Assessment & Plan     Is clinically stable so we will continue with current medications and lab work to confirm status ordered.         Relevant Orders    Hemoglobin A1C (Completed)    Referral to Gastroenterology    Sinus node dysfunction (CMS/HCC)    Current Assessment & Plan     Stable, continue to monitor.         Bilateral leg  edema    Current Assessment & Plan      Is stable, continue with current treatment.  This patient is given furosemide and potassium with the instructions to check their legs in the evening.  If the legs are swollen in the evening then the following morning the patient would need to take furosemide and if the patient takes furosemide the patient needs to take potassium.  If the legs are not swollen at bedtime then furosemide is not needed the next morning and if furosemide is not taken then potassium is not taken.         Malignant neoplasm of urethra (CMS/HCC) - Primary    Current Assessment & Plan     Stable with tumor in remission, no further investigations currently needed.          Other Visit Diagnoses       Arthralgia of right knee        Weakness of extremity              Patient is in need of a power mobility device due to chronic conditions such as gait instability, cardiomyopathy, bilateral leg edema, Urethra cancer, general upper and lower extremity weakness, right knee pain, history of syncope, and general debility that puts him at high risk for falls and injury. Use of a cane, walker, or manual wheelchair is not appropriate due to these conditions. Patient is able to operate the tiller of a power device but would be to weak to operate a manual chair.     This patient will follow-up in 3 months with lab work prior. Other follow-up will be as needed.

## 2023-07-20 NOTE — TELEPHONE ENCOUNTER
PATIENTS FRIEND CALLED STATING DR. LUCAS WAS SUPPOSED TO SEND SOMETHING OVER TO THE PHARMACY FOR DIARRHEA. PATIENT WAS SEEN TODAY AND IT WAS DISCUSSED IN HIS VISIT.     [facility]

## 2023-07-24 NOTE — ASSESSMENT & PLAN NOTE
Is stable, continue with current treatment.  This patient is given furosemide and potassium with the instructions to check their legs in the evening.  If the legs are swollen in the evening then the following morning the patient would need to take furosemide and if the patient takes furosemide the patient needs to take potassium.  If the legs are not swollen at bedtime then furosemide is not needed the next morning and if furosemide is not taken then potassium is not taken.

## 2023-08-01 NOTE — TELEPHONE ENCOUNTER
Spoke to parvez and he stated patient is still having diarrhea. Patient saw Dr. Kwon earlier this week and was told to eliminate dairy products.   Please advise

## 2023-08-04 ENCOUNTER — TELEPHONE (OUTPATIENT)
Dept: PRIMARY CARE | Facility: CLINIC | Age: 88
End: 2023-08-04
Payer: COMMERCIAL

## 2023-08-04 NOTE — TELEPHONE ENCOUNTER
Patients friend called requesting an order for a motorized scooter/wheelchair. He stated patient is having a hard time moving around. Lopez stated he will call back when he knows where to send the order to.   Please advise

## 2023-08-22 DIAGNOSIS — R60.9 PERIPHERAL EDEMA: ICD-10-CM

## 2023-08-22 RX ORDER — POTASSIUM CHLORIDE 600 MG/1
8 CAPSULE, EXTENDED RELEASE ORAL DAILY
Qty: 30 CAPSULE | Refills: 1 | Status: SHIPPED | OUTPATIENT
Start: 2023-08-22 | End: 2023-10-04

## 2023-08-22 NOTE — TELEPHONE ENCOUNTER
Recent Visits  Date Type Provider Dept   07/20/23 Office Visit Juan Jose Dempsey MD Do [id number] [facility]   05/10/23 Office Visit Juan Jose Dempsey MD Do [id number] [facility]   04/10/23 Office Visit Juan Jose Dempsey MD Do [id number] [facility]   Showing recent visits within past 540 days and meeting all other requirements  Future Appointments  Date Type Provider Dept   10/23/23 Appointment Juan Jose Dempsey MD Do [id number] [facility]   Showing future appointments within next 180 days and meeting all other requirements

## 2023-10-02 ENCOUNTER — TELEPHONE (OUTPATIENT)
Dept: PRIMARY CARE | Facility: CLINIC | Age: 88
End: 2023-10-02
Payer: COMMERCIAL

## 2023-10-02 NOTE — TELEPHONE ENCOUNTER
The patient is requesting an order for an electric wheelchair. He states someone came to his home to measure his door and entryways.   He want to know what company the doctor ordered his chair through.  Also, when will he get the chair?  Unfortunately, I could not answer that question nor could I locate that information inside his chart.  He has lost their business card.    Please reach out to the patient for any further clarification.

## 2023-10-03 DIAGNOSIS — R60.9 PERIPHERAL EDEMA: ICD-10-CM

## 2023-10-03 DIAGNOSIS — K21.9 GASTROESOPHAGEAL REFLUX DISEASE WITHOUT ESOPHAGITIS: ICD-10-CM

## 2023-10-04 RX ORDER — POTASSIUM CHLORIDE 600 MG/1
8 CAPSULE, EXTENDED RELEASE ORAL DAILY
Qty: 30 CAPSULE | Refills: 1 | Status: SHIPPED | OUTPATIENT
Start: 2023-10-04 | End: 2023-12-04

## 2023-10-04 RX ORDER — ESOMEPRAZOLE MAGNESIUM 40 MG/1
CAPSULE, DELAYED RELEASE ORAL
Qty: 90 CAPSULE | Refills: 1 | Status: SHIPPED | OUTPATIENT
Start: 2023-10-04 | End: 2024-01-08

## 2023-10-04 NOTE — TELEPHONE ENCOUNTER
Recent Visits  Date Type Provider Dept   07/20/23 Office Visit Juan Jose Dempsey MD Do [id number] [facility]   05/10/23 Office Visit Juan Jose Dempsey MD Do [id number] [facility]   04/10/23 Office Visit Juan Jose Dempsey MD Do [id number] [facility]   Showing recent visits within past 540 days and meeting all other requirements  Future Appointments  Date Type Provider Dept   10/23/23 Appointment Juan Jose Dempsey MD Do [id number] [facility]   Showing future appointments within next 180 days and meeting all other requirements

## 2023-10-22 PROBLEM — R19.7 DIARRHEA: Status: ACTIVE | Noted: 2023-10-22

## 2023-10-22 PROBLEM — I11.9 CARDIOMYOPATHY, HYPERTENSIVE (MULTI): Status: RESOLVED | Noted: 2023-02-17 | Resolved: 2023-10-22

## 2023-10-22 PROBLEM — M19.90 ARTHRITIS: Status: ACTIVE | Noted: 2023-10-22

## 2023-10-22 PROBLEM — I43 CARDIOMYOPATHY, HYPERTENSIVE (MULTI): Status: RESOLVED | Noted: 2023-02-17 | Resolved: 2023-10-22

## 2023-10-22 PROBLEM — I25.811 CORONARY ARTERY DISEASE INVOLVING NATIVE ARTERY OF TRANSPLANTED HEART WITHOUT ANGINA PECTORIS: Status: ACTIVE | Noted: 2023-10-22

## 2023-10-22 PROBLEM — R15.9 INCONTINENCE OF FECES: Status: ACTIVE | Noted: 2023-10-22

## 2023-10-22 NOTE — PROGRESS NOTES
"Subjective    Patient ID: Cb Driver is a 89 y.o. male who presents for Hypertension, Hyperlipidemia, Diabetes, Hypothyroidism, Chronic Kidney Disease, and Knee Pain.     Hypertension: The patient is here for follow-up of elevated blood pressure. He is adherent to a low salt diet. Blood pressure is well controlled at home. No new myalgias or GI upset on diet control.    Hyperlipidemia: The patient is present today for a follow up of hyperlipidemia. He denies having any leg cramping in particular. He is trying to follow a low-fat diet.     Diabetes Mellitus with CKD, stage 3B: The patient presents today for a follow up of DM with CKD, stage 3B. This patient has stage III B renal insufficiency which has been stable. Patient is encouraged to continue water intake. Patient is encouraged to remain very well hydrated. A1C 6.5; GFR 42.     Hypothyroidism: Patient presents for a follow up of their thyroid function. Energy wise that patient is well.     Knee Pain: Patient presents today for a follow up of  with knee pain involving the  right knee.   Right knee pain that goes down to his foot. His foot and ankle are swollen. Patient has arthritis. Patient has been taking OTC Tylenol to help alleviate the pain. Will continue to monitor. He is in the process of getting a scooter to help with ambulation.     Dementia: The patient is present today for a follow up of dementia. Patient is stable at this current time. Patient is accompanied by family member for HPI history.       Pt received flu vaccine today.    The patient denies having the following symptoms: chest pain, chest pressure, fever, chills, N/V/D, constipation, dizziness, headaches, SOB.    Objective   Vitals:  /62   Pulse 50   Temp 36.2 °C (97.1 °F)   Resp 12   Ht 1.702 m (5' 7\")   Wt 87.6 kg (193 lb 3.2 oz)   SpO2 95%   BMI 30.26 kg/m²     Physical Exam  Vitals reviewed.   Constitutional:       Appearance: Normal appearance. He is obese.   Neck:      " Vascular: No carotid bruit.   Cardiovascular:      Rate and Rhythm: Normal rate and regular rhythm.      Pulses: Normal pulses.      Heart sounds: Normal heart sounds.   Pulmonary:      Effort: Pulmonary effort is normal. No respiratory distress.      Breath sounds: Normal breath sounds. No wheezing.   Abdominal:      General: There is no distension.      Palpations: Abdomen is soft. There is no mass.      Tenderness: There is no abdominal tenderness. There is no right CVA tenderness, left CVA tenderness, guarding or rebound.   Musculoskeletal:      Cervical back: Normal range of motion and neck supple. No rigidity.      Right lower leg: Edema (right ankle) present.      Left lower leg: No edema.   Lymphadenopathy:      Cervical: No cervical adenopathy.   Neurological:      Mental Status: He is alert.        Labs active- future.     Assessment/Plan   Problem List Items Addressed This Visit       Primary hypertension - Primary      Is stable, continue with current treatment.           Relevant Orders    CBC and Auto Differential    Comprehensive Metabolic Panel    Hypothyroidism     Is clinically stable so we will continue with current medications and lab work to confirm status ordered.           Relevant Orders    Tsh With Reflex To Free T4 If Abnormal    Mixed hyperlipidemia     Is clinically stable so we will continue with current medications and lab work to confirm status ordered.           Relevant Orders    Lipid Panel    Type 2 diabetes mellitus without complication, without long-term current use of insulin (CMS/[facility])     Is clinically stable so we will continue with current medications and lab work to confirm status ordered.           Relevant Orders    Hemoglobin A1C    Albumin , Urine Random    Follow Up In [facility] - PCP - Medicare Annual    Dementia (CMS/[facility])      Is stable, continue with current treatment.           Need for immunization against influenza    Relevant Orders    Flu vaccine,  quadrivalent, high-dose, preservative free, age 65y+ (FLUZONE)       Follow up in: 4 month(s) or sooner if needed with labs today.     Scribe Attestation  By signing my name below, I, Mile Nesbitt   attest that this documentation has been prepared under the direction and in the presence of Juan Jose Dempsey MD.

## 2023-10-23 ENCOUNTER — LAB (OUTPATIENT)
Dept: LAB | Facility: LAB | Age: 88
End: 2023-10-23
Payer: COMMERCIAL

## 2023-10-23 ENCOUNTER — OFFICE VISIT (OUTPATIENT)
Dept: PRIMARY CARE | Facility: CLINIC | Age: 88
End: 2023-10-23
Payer: COMMERCIAL

## 2023-10-23 VITALS
SYSTOLIC BLOOD PRESSURE: 124 MMHG | HEIGHT: 67 IN | RESPIRATION RATE: 12 BRPM | DIASTOLIC BLOOD PRESSURE: 62 MMHG | HEART RATE: 50 BPM | TEMPERATURE: 97.1 F | OXYGEN SATURATION: 95 % | BODY MASS INDEX: 30.32 KG/M2 | WEIGHT: 193.2 LBS

## 2023-10-23 DIAGNOSIS — E78.2 MIXED HYPERLIPIDEMIA: ICD-10-CM

## 2023-10-23 DIAGNOSIS — E03.9 ACQUIRED HYPOTHYROIDISM: ICD-10-CM

## 2023-10-23 DIAGNOSIS — I10 PRIMARY HYPERTENSION: ICD-10-CM

## 2023-10-23 DIAGNOSIS — Z23 NEED FOR IMMUNIZATION AGAINST INFLUENZA: ICD-10-CM

## 2023-10-23 DIAGNOSIS — F03.90 DEMENTIA WITHOUT BEHAVIORAL DISTURBANCE, PSYCHOTIC DISTURBANCE, MOOD DISTURBANCE, OR ANXIETY, UNSPECIFIED DEMENTIA SEVERITY, UNSPECIFIED DEMENTIA TYPE (MULTI): ICD-10-CM

## 2023-10-23 DIAGNOSIS — E11.9 TYPE 2 DIABETES MELLITUS WITHOUT COMPLICATION, WITHOUT LONG-TERM CURRENT USE OF INSULIN (MULTI): ICD-10-CM

## 2023-10-23 DIAGNOSIS — I10 PRIMARY HYPERTENSION: Primary | ICD-10-CM

## 2023-10-23 PROBLEM — N28.9 RENAL INSUFFICIENCY: Status: RESOLVED | Noted: 2023-02-17 | Resolved: 2023-10-23

## 2023-10-23 LAB
ALBUMIN SERPL BCP-MCNC: 3.5 G/DL (ref 3.4–5)
ALP SERPL-CCNC: 140 U/L (ref 33–136)
ALT SERPL W P-5'-P-CCNC: 7 U/L (ref 10–52)
ANION GAP SERPL CALC-SCNC: 11 MMOL/L (ref 10–20)
AST SERPL W P-5'-P-CCNC: 10 U/L (ref 9–39)
BASOPHILS # BLD AUTO: 0.03 X10*3/UL (ref 0–0.1)
BASOPHILS NFR BLD AUTO: 0.6 %
BILIRUB SERPL-MCNC: 1 MG/DL (ref 0–1.2)
BUN SERPL-MCNC: 44 MG/DL (ref 6–23)
CALCIUM SERPL-MCNC: 8.3 MG/DL (ref 8.6–10.3)
CHLORIDE SERPL-SCNC: 108 MMOL/L (ref 98–107)
CHOLEST SERPL-MCNC: 90 MG/DL (ref 0–199)
CHOLESTEROL/HDL RATIO: 2.3
CO2 SERPL-SCNC: 22 MMOL/L (ref 21–32)
CREAT SERPL-MCNC: 2.1 MG/DL (ref 0.5–1.3)
CREAT UR-MCNC: 34.9 MG/DL (ref 20–370)
EOSINOPHIL # BLD AUTO: 0.15 X10*3/UL (ref 0–0.4)
EOSINOPHIL NFR BLD AUTO: 3 %
ERYTHROCYTE [DISTWIDTH] IN BLOOD BY AUTOMATED COUNT: 14.1 % (ref 11.5–14.5)
GFR SERPL CREATININE-BSD FRML MDRD: 29 ML/MIN/1.73M*2
GLUCOSE SERPL-MCNC: 106 MG/DL (ref 74–99)
HCT VFR BLD AUTO: 38.3 % (ref 41–52)
HDLC SERPL-MCNC: 39.6 MG/DL
HGB BLD-MCNC: 12.1 G/DL (ref 13.5–17.5)
IMM GRANULOCYTES # BLD AUTO: 0.01 X10*3/UL (ref 0–0.5)
IMM GRANULOCYTES NFR BLD AUTO: 0.2 % (ref 0–0.9)
LDLC SERPL CALC-MCNC: 42 MG/DL
LYMPHOCYTES # BLD AUTO: 1.91 X10*3/UL (ref 0.8–3)
LYMPHOCYTES NFR BLD AUTO: 38.4 %
MCH RBC QN AUTO: 31.3 PG (ref 26–34)
MCHC RBC AUTO-ENTMCNC: 31.6 G/DL (ref 32–36)
MCV RBC AUTO: 99 FL (ref 80–100)
MICROALBUMIN UR-MCNC: 9 MG/L
MICROALBUMIN/CREAT UR: 25.8 UG/MG CREAT
MONOCYTES # BLD AUTO: 0.36 X10*3/UL (ref 0.05–0.8)
MONOCYTES NFR BLD AUTO: 7.2 %
NEUTROPHILS # BLD AUTO: 2.52 X10*3/UL (ref 1.6–5.5)
NEUTROPHILS NFR BLD AUTO: 50.6 %
NON HDL CHOLESTEROL: 50 MG/DL (ref 0–149)
NRBC BLD-RTO: 0 /100 WBCS (ref 0–0)
PLATELET # BLD AUTO: 147 X10*3/UL (ref 150–450)
PMV BLD AUTO: 12.6 FL (ref 7.5–11.5)
POTASSIUM SERPL-SCNC: 4 MMOL/L (ref 3.5–5.3)
PROT SERPL-MCNC: 5.7 G/DL (ref 6.4–8.2)
RBC # BLD AUTO: 3.86 X10*6/UL (ref 4.5–5.9)
SODIUM SERPL-SCNC: 137 MMOL/L (ref 136–145)
TRIGL SERPL-MCNC: 41 MG/DL (ref 0–149)
TSH SERPL-ACNC: 1.25 MIU/L (ref 0.44–3.98)
VLDL: 8 MG/DL (ref 0–40)
WBC # BLD AUTO: 5 X10*3/UL (ref 4.4–11.3)

## 2023-10-23 PROCEDURE — 84443 ASSAY THYROID STIM HORMONE: CPT

## 2023-10-23 PROCEDURE — 80061 LIPID PANEL: CPT

## 2023-10-23 PROCEDURE — G0008 ADMIN INFLUENZA VIRUS VAC: HCPCS | Performed by: FAMILY MEDICINE

## 2023-10-23 PROCEDURE — 36415 COLL VENOUS BLD VENIPUNCTURE: CPT

## 2023-10-23 PROCEDURE — 85025 COMPLETE CBC W/AUTO DIFF WBC: CPT

## 2023-10-23 PROCEDURE — 1159F MED LIST DOCD IN RCRD: CPT | Performed by: FAMILY MEDICINE

## 2023-10-23 PROCEDURE — 82570 ASSAY OF URINE CREATININE: CPT

## 2023-10-23 PROCEDURE — 80053 COMPREHEN METABOLIC PANEL: CPT

## 2023-10-23 PROCEDURE — 3078F DIAST BP <80 MM HG: CPT | Performed by: FAMILY MEDICINE

## 2023-10-23 PROCEDURE — 83036 HEMOGLOBIN GLYCOSYLATED A1C: CPT

## 2023-10-23 PROCEDURE — 82043 UR ALBUMIN QUANTITATIVE: CPT

## 2023-10-23 PROCEDURE — 1160F RVW MEDS BY RX/DR IN RCRD: CPT | Performed by: FAMILY MEDICINE

## 2023-10-23 PROCEDURE — 3074F SYST BP LT 130 MM HG: CPT | Performed by: FAMILY MEDICINE

## 2023-10-23 PROCEDURE — 99214 OFFICE O/P EST MOD 30 MIN: CPT | Performed by: FAMILY MEDICINE

## 2023-10-23 PROCEDURE — 90662 IIV NO PRSV INCREASED AG IM: CPT | Performed by: FAMILY MEDICINE

## 2023-10-24 LAB
EST. AVERAGE GLUCOSE BLD GHB EST-MCNC: 123 MG/DL
HBA1C MFR BLD: 5.9 %

## 2023-10-31 DIAGNOSIS — N32.81 OVERACTIVE BLADDER: ICD-10-CM

## 2023-10-31 RX ORDER — OXYBUTYNIN CHLORIDE 10 MG/1
TABLET, EXTENDED RELEASE ORAL
Qty: 30 TABLET | Refills: 5 | Status: SHIPPED | OUTPATIENT
Start: 2023-10-31 | End: 2024-05-10

## 2023-10-31 NOTE — TELEPHONE ENCOUNTER
Rx Refill Request     Name: Cb Driver  :  1931   Medication Name:  Oxybyutynin XL (Ditroopan-XL) 10 mg 24 hr Tablet   Last fill: 10.09.2023  Specific Pharmacy location:  [facility]   Date of last appointment:  10/23/2023   Date of next appointment:  2023   Best number to reach patient:  [phone]         RX PENDED to [facility] as directed

## 2023-11-01 DIAGNOSIS — N40.0 PROSTATE ENLARGEMENT: ICD-10-CM

## 2023-11-01 DIAGNOSIS — F32.4 MAJOR DEPRESSIVE DISORDER WITH SINGLE EPISODE, IN PARTIAL REMISSION (CMS-HCC): ICD-10-CM

## 2023-11-01 DIAGNOSIS — I10 PRIMARY HYPERTENSION: ICD-10-CM

## 2023-11-01 DIAGNOSIS — I77.9 PERIPHERAL ARTERIAL OCCLUSIVE DISEASE (CMS-HCC): ICD-10-CM

## 2023-11-02 RX ORDER — CITALOPRAM 20 MG/1
TABLET, FILM COATED ORAL
Qty: 90 TABLET | Refills: 1 | Status: SHIPPED | OUTPATIENT
Start: 2023-11-02 | End: 2024-05-10

## 2023-11-02 RX ORDER — CILOSTAZOL 50 MG/1
TABLET ORAL
Qty: 180 TABLET | Refills: 1 | Status: SHIPPED | OUTPATIENT
Start: 2023-11-02

## 2023-11-02 RX ORDER — TAMSULOSIN HYDROCHLORIDE 0.4 MG/1
0.4 CAPSULE ORAL DAILY
Qty: 90 CAPSULE | Refills: 1 | Status: SHIPPED | OUTPATIENT
Start: 2023-11-02

## 2023-11-02 NOTE — TELEPHONE ENCOUNTER
Recent Visits  Date Type Provider Dept   10/23/23 Office Visit Juan Jose Dempsey MD Do [id number] [facility]   07/20/23 Office Visit Juan Jose Dempsey MD Do [id number] [facility]   05/10/23 Office Visit Juan Jose Dempsey MD Do [id number] [facility]   04/10/23 Office Visit Juan Jose Dempsey MD Do [id number] [facility]   Showing recent visits within past 540 days and meeting all other requirements  Future Appointments  Date Type Provider Dept   11/06/23 Appointment Juan Jose Dempsey MD Do [id number] [facility]   02/19/24 Appointment Juan Jose Dempsey MD Do [id number] [facility]   Showing future appointments within next 180 days and meeting all other requirements

## 2023-11-05 NOTE — PROGRESS NOTES
Subjective    Patient ID: Cb Driver is a 89 y.o. male who presents for Chronic Kidney Disease and Results.     Chronic Renal Impairment, Stage 4: This patient has stage IV renal insufficiency which has been stable. Patient is encouraged to continue water intake. Patient is encouraged to remain very well hydrated.  GFR 29; Creatinine 2.10.     Hypertension: The patient is here for follow-up of elevated blood pressure. He is adherent to a low salt diet. Blood pressure is well controlled at home. No new myalgias or GI upset on diet control.    Diabetes Mellitus: The patient presents today for a follow up of DM. Patient denies any side effects to the medications.     Hypothyroidism: Patient presents for a follow up of their thyroid function. Energy wise that patient is adequately.       The patient denies having the following symptoms: chest pain, chest pressure, fever, chills, N/V/D, constipation, dizziness, headaches, SOB.    Objective   Vitals:  /67   Pulse 74   Temp 36.3 °C (97.4 °F)   Resp 16   Wt 88 kg (194 lb)   SpO2 98%   BMI 30.38 kg/m²     Physical Exam  Vitals reviewed.   Constitutional:       Appearance: He is obese.   Cardiovascular:      Rate and Rhythm: Normal rate and regular rhythm.      Pulses: Normal pulses.      Heart sounds: Normal heart sounds.   Pulmonary:      Effort: Pulmonary effort is normal.      Breath sounds: Normal breath sounds.   Musculoskeletal:      Right lower leg: No edema.      Left lower leg: No edema.   Neurological:      Mental Status: He is alert and oriented to person, place, and time.            Labs reviewed from : 10/23/2023 CMP, CBC, Lipid, GFR 29      Assessment/Plan   Problem List Items Addressed This Visit       Primary hypertension      Is stable, continue with current treatment.           Relevant Orders    CBC and Auto Differential    Lipid Panel    Comprehensive Metabolic Panel    Hypothyroidism     Is clinically stable so we will continue with  current medications and lab work to confirm status ordered.          Relevant Orders    Tsh With Reflex To Free T4 If Abnormal    Type 2 diabetes mellitus without complication, without long-term current use of insulin (CMS/[facility])     Is clinically stable so we will continue with current medications and lab work to confirm status ordered.          Relevant Orders    Hemoglobin A1C    CKD (chronic kidney disease) stage 4, GFR 15-29 ml/min (CMS/[facility]) - Primary      This condition is poorly controlled, therapeutic changes necessary. Increase water intake.          Other Visit Diagnoses       Prostate cancer screening        Relevant Orders    Prostate Spec.Ag,Screen              This patient will keep the previously scheduled follow-up appointment on  02/19/2024  or sooner if needed.     Scribe Attestation  By signing my name below, I, Mile Nesbitt   attest that this documentation has been prepared under the direction and in the presence of Juan Jose Dempsey MD.

## 2023-11-06 ENCOUNTER — OFFICE VISIT (OUTPATIENT)
Dept: PRIMARY CARE | Facility: CLINIC | Age: 88
End: 2023-11-06
Payer: COMMERCIAL

## 2023-11-06 VITALS
OXYGEN SATURATION: 98 % | TEMPERATURE: 97.4 F | DIASTOLIC BLOOD PRESSURE: 67 MMHG | HEART RATE: 74 BPM | RESPIRATION RATE: 16 BRPM | WEIGHT: 194 LBS | SYSTOLIC BLOOD PRESSURE: 126 MMHG | BODY MASS INDEX: 30.38 KG/M2

## 2023-11-06 DIAGNOSIS — E11.9 TYPE 2 DIABETES MELLITUS WITHOUT COMPLICATION, WITHOUT LONG-TERM CURRENT USE OF INSULIN (MULTI): ICD-10-CM

## 2023-11-06 DIAGNOSIS — Z12.5 PROSTATE CANCER SCREENING: ICD-10-CM

## 2023-11-06 DIAGNOSIS — E03.9 ACQUIRED HYPOTHYROIDISM: ICD-10-CM

## 2023-11-06 DIAGNOSIS — N18.4 CKD (CHRONIC KIDNEY DISEASE) STAGE 4, GFR 15-29 ML/MIN (MULTI): Primary | ICD-10-CM

## 2023-11-06 DIAGNOSIS — I10 PRIMARY HYPERTENSION: ICD-10-CM

## 2023-11-06 PROCEDURE — 1160F RVW MEDS BY RX/DR IN RCRD: CPT | Performed by: FAMILY MEDICINE

## 2023-11-06 PROCEDURE — 3078F DIAST BP <80 MM HG: CPT | Performed by: FAMILY MEDICINE

## 2023-11-06 PROCEDURE — 99213 OFFICE O/P EST LOW 20 MIN: CPT | Performed by: FAMILY MEDICINE

## 2023-11-06 PROCEDURE — 3074F SYST BP LT 130 MM HG: CPT | Performed by: FAMILY MEDICINE

## 2023-11-06 PROCEDURE — 1159F MED LIST DOCD IN RCRD: CPT | Performed by: FAMILY MEDICINE

## 2023-11-21 RX ORDER — CARVEDILOL 3.12 MG/1
3.12 TABLET ORAL 2 TIMES DAILY
Qty: 180 TABLET | Refills: 3 | Status: SHIPPED | OUTPATIENT
Start: 2023-11-21 | End: 2023-12-08

## 2023-12-02 DIAGNOSIS — E03.9 HYPOTHYROIDISM, UNSPECIFIED TYPE: ICD-10-CM

## 2023-12-02 DIAGNOSIS — R60.9 PERIPHERAL EDEMA: ICD-10-CM

## 2023-12-02 DIAGNOSIS — R07.1 CHEST PAIN ON BREATHING: ICD-10-CM

## 2023-12-02 DIAGNOSIS — I10 ESSENTIAL HYPERTENSION: Primary | ICD-10-CM

## 2023-12-03 NOTE — TELEPHONE ENCOUNTER
Rx Refill Request     Name: Cb Driver  :  1931   Medication Name:    Levothyroxine (Synthroid, Levoxyl) 50 mcg tablet    Last fill: 2023 90 Day supply 0 refills  2. Potassium chloride ER (Micro-K) 8 mEq ER capsule    Last fill: 2023 30 day supply 0 refills   Specific Pharmacy location:  [facility]  Date of last appointment:  2023   Date of next appointment:  2024   Best number to reach patient:  [phone]       RX PENDED to [facility] as directed by Electronic Correspondence

## 2023-12-04 RX ORDER — POTASSIUM CHLORIDE 600 MG/1
8 CAPSULE, EXTENDED RELEASE ORAL DAILY
Qty: 30 CAPSULE | Refills: 0 | Status: SHIPPED | OUTPATIENT
Start: 2023-12-04 | End: 2024-01-03

## 2023-12-04 RX ORDER — LEVOTHYROXINE SODIUM 50 UG/1
50 TABLET ORAL DAILY
Qty: 90 TABLET | Refills: 0 | Status: SHIPPED | OUTPATIENT
Start: 2023-12-04 | End: 2024-05-10

## 2023-12-06 RX ORDER — CLOPIDOGREL BISULFATE 75 MG/1
75 TABLET ORAL DAILY
Qty: 90 TABLET | Refills: 3 | Status: SHIPPED | OUTPATIENT
Start: 2023-12-06

## 2023-12-06 RX ORDER — LISINOPRIL 5 MG/1
TABLET ORAL DAILY
Qty: 45 TABLET | Refills: 3 | Status: SHIPPED | OUTPATIENT
Start: 2023-12-06

## 2023-12-08 ENCOUNTER — OFFICE VISIT (OUTPATIENT)
Dept: CARDIOLOGY | Facility: CLINIC | Age: 88
End: 2023-12-08
Payer: COMMERCIAL

## 2023-12-08 VITALS
SYSTOLIC BLOOD PRESSURE: 104 MMHG | DIASTOLIC BLOOD PRESSURE: 56 MMHG | BODY MASS INDEX: 30.5 KG/M2 | HEART RATE: 48 BPM | HEIGHT: 66 IN | WEIGHT: 189.8 LBS

## 2023-12-08 DIAGNOSIS — E11.9 TYPE 2 DIABETES MELLITUS WITHOUT COMPLICATION, WITHOUT LONG-TERM CURRENT USE OF INSULIN (MULTI): ICD-10-CM

## 2023-12-08 DIAGNOSIS — I25.811 CORONARY ARTERY DISEASE INVOLVING NATIVE ARTERY OF TRANSPLANTED HEART WITHOUT ANGINA PECTORIS: ICD-10-CM

## 2023-12-08 DIAGNOSIS — R00.1 BRADYCARDIA: ICD-10-CM

## 2023-12-08 DIAGNOSIS — I25.5 ISCHEMIC CARDIOMYOPATHY: ICD-10-CM

## 2023-12-08 DIAGNOSIS — E78.2 MIXED HYPERLIPIDEMIA: ICD-10-CM

## 2023-12-08 DIAGNOSIS — Z87.891 FORMER SMOKER: ICD-10-CM

## 2023-12-08 DIAGNOSIS — I10 PRIMARY HYPERTENSION: ICD-10-CM

## 2023-12-08 PROBLEM — E66.9 CLASS 1 OBESITY WITH BODY MASS INDEX (BMI) OF 30.0 TO 30.9 IN ADULT: Status: ACTIVE | Noted: 2023-12-08

## 2023-12-08 PROBLEM — E66.811 CLASS 1 OBESITY WITH BODY MASS INDEX (BMI) OF 30.0 TO 30.9 IN ADULT: Status: ACTIVE | Noted: 2023-12-08

## 2023-12-08 PROCEDURE — 1160F RVW MEDS BY RX/DR IN RCRD: CPT | Performed by: INTERNAL MEDICINE

## 2023-12-08 PROCEDURE — 3074F SYST BP LT 130 MM HG: CPT | Performed by: INTERNAL MEDICINE

## 2023-12-08 PROCEDURE — 99214 OFFICE O/P EST MOD 30 MIN: CPT | Performed by: INTERNAL MEDICINE

## 2023-12-08 PROCEDURE — 3078F DIAST BP <80 MM HG: CPT | Performed by: INTERNAL MEDICINE

## 2023-12-08 PROCEDURE — 1159F MED LIST DOCD IN RCRD: CPT | Performed by: INTERNAL MEDICINE

## 2023-12-08 PROCEDURE — 93000 ELECTROCARDIOGRAM COMPLETE: CPT | Performed by: INTERNAL MEDICINE
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
## 2023-12-08 NOTE — PATIENT INSTRUCTIONS
STOP Carvedilol    Follow up office visit in 6 months.    Continue same medications/treatment.  Patient educated on proper medication use.  Please bring all medicines, vitamins and herbal supplements with you when you come to the office.    I, Liliana Valencia LPN, am scribing for and in the presence of  Dr. Galo Gaines MD, FACC

## 2024-01-06 DIAGNOSIS — K21.9 GASTROESOPHAGEAL REFLUX DISEASE WITHOUT ESOPHAGITIS: ICD-10-CM

## 2024-01-08 RX ORDER — ESOMEPRAZOLE MAGNESIUM 40 MG/1
CAPSULE, DELAYED RELEASE ORAL
Qty: 90 CAPSULE | Refills: 0 | Status: SHIPPED | OUTPATIENT
Start: 2024-01-08

## 2024-01-08 NOTE — TELEPHONE ENCOUNTER
Recent Visits  Date Type Provider Dept   11/06/23 Office Visit Juan Jose Dempsey MD Do [id number] [facility]   10/23/23 Office Visit Juan Jose Dempsey MD Do [id number] [facility]   07/20/23 Office Visit Juan Jose Dempsey MD Do [id number] [facility]   05/10/23 Office Visit Juan Jose Dempsey MD Do [id number] [facility]   04/10/23 Office Visit Juan Jose Dempsey MD Do [id number] [facility]   Showing recent visits within past 540 days and meeting all other requirements  Future Appointments  Date Type Provider Dept   02/20/24 Appointment Juan Jose Dempsey MD Do [id number] [facility]   Showing future appointments within next 180 days and meeting all other requirements

## 2024-02-03 DIAGNOSIS — R60.9 PERIPHERAL EDEMA: Primary | ICD-10-CM

## 2024-02-05 RX ORDER — POTASSIUM CHLORIDE 600 MG/1
8 CAPSULE, EXTENDED RELEASE ORAL DAILY
Qty: 30 CAPSULE | Refills: 1 | Status: SHIPPED | OUTPATIENT
Start: 2024-02-05 | End: 2024-04-22

## 2024-02-05 NOTE — TELEPHONE ENCOUNTER
Recent Visits  Date Type Provider Dept   11/06/23 Office Visit Juan Jose Dempsey MD Do [id number] [facility]   10/23/23 Office Visit Juan Jose Dempsey MD Do [id number] [facility]   07/20/23 Office Visit Juan Jose Dempsey MD Do [id number] [facility]   05/10/23 Office Visit Juan Jose Dempsey MD Do [id number] [facility]   04/10/23 Office Visit Juan Jose Dempsey MD Do [id number] [facility]   Showing recent visits within past 540 days and meeting all other requirements  Future Appointments  Date Type Provider Dept   02/21/24 Appointment Juan Jose Dempsey MD Do [id number] [facility]   Showing future appointments within next 180 days and meeting all other requirements

## 2024-02-14 ENCOUNTER — LAB (OUTPATIENT)
Dept: LAB | Facility: LAB | Age: 89
End: 2024-02-14
Payer: COMMERCIAL

## 2024-02-14 ENCOUNTER — HOSPITAL ENCOUNTER (OUTPATIENT)
Dept: CARDIOLOGY | Facility: HOSPITAL | Age: 89
Discharge: HOME | End: 2024-02-14
Payer: COMMERCIAL

## 2024-02-14 ENCOUNTER — OFFICE VISIT (OUTPATIENT)
Dept: CARDIOLOGY | Facility: CLINIC | Age: 89
End: 2024-02-14
Payer: COMMERCIAL

## 2024-02-14 VITALS
WEIGHT: 184 LBS | HEART RATE: 75 BPM | SYSTOLIC BLOOD PRESSURE: 110 MMHG | BODY MASS INDEX: 27.89 KG/M2 | DIASTOLIC BLOOD PRESSURE: 60 MMHG | HEIGHT: 68 IN

## 2024-02-14 DIAGNOSIS — R55 SYNCOPE AND COLLAPSE: ICD-10-CM

## 2024-02-14 DIAGNOSIS — Z95.818 STATUS POST PLACEMENT OF IMPLANTABLE LOOP RECORDER: Primary | ICD-10-CM

## 2024-02-14 DIAGNOSIS — Z95.818 PRESENCE OF CARDIAC DEVICE: ICD-10-CM

## 2024-02-14 DIAGNOSIS — R94.5 ABNORMAL RESULTS OF LIVER FUNCTION STUDIES: ICD-10-CM

## 2024-02-14 DIAGNOSIS — Z87.891 FORMER SMOKER: ICD-10-CM

## 2024-02-14 DIAGNOSIS — Z95.818 STATUS POST PLACEMENT OF IMPLANTABLE LOOP RECORDER: ICD-10-CM

## 2024-02-14 DIAGNOSIS — I47.20 VENTRICULAR TACHYCARDIA (MULTI): ICD-10-CM

## 2024-02-14 DIAGNOSIS — I10 PRIMARY HYPERTENSION: ICD-10-CM

## 2024-02-14 PROBLEM — E66.9 CLASS 1 OBESITY WITH BODY MASS INDEX (BMI) OF 30.0 TO 30.9 IN ADULT: Status: RESOLVED | Noted: 2023-12-08 | Resolved: 2024-02-14

## 2024-02-14 PROBLEM — E66.811 CLASS 1 OBESITY WITH BODY MASS INDEX (BMI) OF 30.0 TO 30.9 IN ADULT: Status: RESOLVED | Noted: 2023-12-08 | Resolved: 2024-02-14

## 2024-02-14 LAB
INR PPP: 1.1 (ref 0.9–1.1)
PROTHROMBIN TIME: 12.6 SECONDS (ref 9.8–12.8)

## 2024-02-14 PROCEDURE — 85610 PROTHROMBIN TIME: CPT

## 2024-02-14 PROCEDURE — 3074F SYST BP LT 130 MM HG: CPT | Performed by: INTERNAL MEDICINE

## 2024-02-14 PROCEDURE — 99214 OFFICE O/P EST MOD 30 MIN: CPT | Performed by: INTERNAL MEDICINE

## 2024-02-14 PROCEDURE — 1159F MED LIST DOCD IN RCRD: CPT | Performed by: INTERNAL MEDICINE

## 2024-02-14 PROCEDURE — 36415 COLL VENOUS BLD VENIPUNCTURE: CPT

## 2024-02-14 PROCEDURE — 93000 ELECTROCARDIOGRAM COMPLETE: CPT | Performed by: INTERNAL MEDICINE

## 2024-02-14 PROCEDURE — 3078F DIAST BP <80 MM HG: CPT | Performed by: INTERNAL MEDICINE

## 2024-02-14 RX ORDER — MUPIROCIN 20 MG/G
1 OINTMENT TOPICAL ONCE
Status: CANCELLED | OUTPATIENT
Start: 2024-02-14 | End: 2024-02-14

## 2024-02-14 RX ORDER — CHLORHEXIDINE GLUCONATE 40 MG/ML
SOLUTION TOPICAL ONCE
Status: CANCELLED | OUTPATIENT
Start: 2024-02-14 | End: 2024-02-14

## 2024-02-14 NOTE — PATIENT INSTRUCTIONS
Continue same medications/treatment.  Patient educated on proper medication use.  Patient educated on risk factor modification.  Please bring any lab results from other providers/physicians to your next appointment.    Please bring all medicines, vitamins, and herbal supplements with you when you come to the office.    Prescriptions will not be filled unless you are compliant with your follow up appointments or have a follow up appointment scheduled as per instruction of your physician. Refills should be requested at the time of your visit.    Loop removal   Loop implant    I, Lucy Kiser LPN, AM SCRIBING FOR, AND IN THE PRESENCE OF DR. RUBIO CASTELAN MD
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
## 2024-02-19 ENCOUNTER — APPOINTMENT (OUTPATIENT)
Dept: PRIMARY CARE | Facility: CLINIC | Age: 89
End: 2024-02-19
Payer: COMMERCIAL

## 2024-02-19 ENCOUNTER — TELEPHONE (OUTPATIENT)
Dept: CARDIOLOGY | Facility: HOSPITAL | Age: 89
End: 2024-02-19

## 2024-02-20 ENCOUNTER — APPOINTMENT (OUTPATIENT)
Dept: PRIMARY CARE | Facility: CLINIC | Age: 89
End: 2024-02-20
Payer: COMMERCIAL

## 2024-02-20 NOTE — PROGRESS NOTES
Subjective   Patient ID: Cb Driver is an 89 y.o. male who is presenting today for a Chronic Kidney Disease, Diabetes, Hypertension, Hyperlipidemia, Hypothyroidism, Depression, Dementia, and Medicare Annual Wellness Visit Subsequent .    The patients living will is active. His POA is  friend Lopez Nelson , back-up POA is Unknown at this time.  The patients current code status is DNR.     Encounter for subsequent AWV: Medicare wellness visit done, patient is in satisfactory living circumstances where the patient's needs are met.  This patient is advised to develop or update their living will and provide us a copy for the chart.    Hypertension: The patient is here for follow-up of elevated blood pressure. He is adherent to a low salt diet. Blood pressure is well controlled at home. No new myalgias or GI upset on diet control.    Hyperlipidemia: The patient is present today for a follow up of hyperlipidemia. He denies having any leg cramping in particular. He is trying to follow a low-fat diet.     Hypothyroidism: Patient presents for a follow up of their thyroid function. Energy wise that patient is well.     Depression: Patient is presenting today for a follow up of depression. He voices that they are doing well.     Diabetes Mellitus with CKD, stage 4: The patient presents today for a follow up of DM with CKD, stage 4. This patient has stage IV renal insufficiency which has been stable. Patient is encouraged to continue water intake. Patient is encouraged to remain very well hydrated.      Dementia: The patient is present today for a follow up of dementia. Patient reports that his memory is going downhill. He voices that he knows that a person is talking but cannot make out what they are saying, this may be due to his hearing not memory.         The patient denies having the following symptoms: chest pain, chest pressure, fever, chills, N/V/D, constipation, dizziness, headaches, SOB.    Objective   Vitals:  BP  "102/66   Pulse 92   Temp 36.5 °C (97.7 °F)   Resp 14   Ht 1.727 m (5' 8\")   Wt 84.1 kg (185 lb 6.4 oz)   SpO2 97%   BMI 28.19 kg/m²       Physical Exam  Vitals reviewed.   Constitutional:       Appearance: Normal appearance.   Neck:      Vascular: No carotid bruit.   Cardiovascular:      Rate and Rhythm: Normal rate and regular rhythm.      Pulses: Normal pulses.      Heart sounds: Normal heart sounds.   Pulmonary:      Effort: Pulmonary effort is normal. No respiratory distress.      Breath sounds: Normal breath sounds. No wheezing.   Abdominal:      General: There is no distension.      Palpations: Abdomen is soft. There is no mass.      Tenderness: There is no abdominal tenderness. There is no right CVA tenderness, left CVA tenderness, guarding or rebound.   Musculoskeletal:      Cervical back: Normal range of motion and neck supple. No rigidity.      Right lower leg: No edema.      Left lower leg: No edema.   Lymphadenopathy:      Cervical: No cervical adenopathy.   Neurological:      Mental Status: He is alert.       Labs active- future.       Assessment/Plan   Problem List Items Addressed This Visit       Primary hypertension    Current Assessment & Plan     Is clinically stable so we will continue with current medications and lab work to confirm status ordered.          Relevant Orders    Follow Up In [facility] - PCP - Established    Hypothyroidism    Current Assessment & Plan     Is clinically stable so we will continue with current medications and lab work to confirm status ordered.          Major depressive disorder with single episode, in partial remission (CMS/HCC)    Current Assessment & Plan      Is well controlled, continue with current medications.          Mixed hyperlipidemia    Current Assessment & Plan     Is clinically stable so we will continue with current medications and lab work to confirm status ordered.          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           Encounter for subsequent annual wellness visit (AWV) in Medicare patient - Primary    Current Assessment & Plan     Medicare wellness visit done, patient is in satisfactory living circumstances where the patient's needs are met.  This patient is advised to develop or update their living will and provide us a copy for the chart.          Relevant Orders    DNR (Completed)    Malignant neoplasm of urethra (CMS/HCC)    Current Assessment & Plan      Is stable, in remission           Dementia (CMS/HCC)    Overview     Jan 06, 2018 Entered By: MIKE MOLINA Comment: MOCA 13/30         Prostate cancer screening       Follow up in: 3 month(s) or sooner if needed with labs prior.    Scribe Attestation  By signing my name below, IKarlie Scribe   attest that this documentation has been prepared under the direction and in the presence of Juan Jose Dempsey MD.

## 2024-02-21 ENCOUNTER — OFFICE VISIT (OUTPATIENT)
Dept: PRIMARY CARE | Facility: CLINIC | Age: 89
End: 2024-02-21
Payer: COMMERCIAL

## 2024-02-21 VITALS
BODY MASS INDEX: 28.1 KG/M2 | DIASTOLIC BLOOD PRESSURE: 66 MMHG | OXYGEN SATURATION: 97 % | HEART RATE: 92 BPM | RESPIRATION RATE: 14 BRPM | WEIGHT: 185.4 LBS | HEIGHT: 68 IN | SYSTOLIC BLOOD PRESSURE: 102 MMHG | TEMPERATURE: 97.7 F

## 2024-02-21 DIAGNOSIS — F32.4 MAJOR DEPRESSIVE DISORDER WITH SINGLE EPISODE, IN PARTIAL REMISSION (CMS-HCC): ICD-10-CM

## 2024-02-21 DIAGNOSIS — Z12.5 PROSTATE CANCER SCREENING: ICD-10-CM

## 2024-02-21 DIAGNOSIS — C68.0 MALIGNANT NEOPLASM OF URETHRA (MULTI): ICD-10-CM

## 2024-02-21 DIAGNOSIS — Z00.00 ENCOUNTER FOR SUBSEQUENT ANNUAL WELLNESS VISIT (AWV) IN MEDICARE PATIENT: Primary | ICD-10-CM

## 2024-02-21 DIAGNOSIS — F03.90 DEMENTIA WITHOUT BEHAVIORAL DISTURBANCE, PSYCHOTIC DISTURBANCE, MOOD DISTURBANCE, OR ANXIETY, UNSPECIFIED DEMENTIA SEVERITY, UNSPECIFIED DEMENTIA TYPE (MULTI): ICD-10-CM

## 2024-02-21 DIAGNOSIS — N18.4 TYPE 2 DIABETES MELLITUS WITH STAGE 4 CHRONIC KIDNEY DISEASE, WITHOUT LONG-TERM CURRENT USE OF INSULIN (MULTI): ICD-10-CM

## 2024-02-21 DIAGNOSIS — I10 PRIMARY HYPERTENSION: ICD-10-CM

## 2024-02-21 DIAGNOSIS — E11.22 TYPE 2 DIABETES MELLITUS WITH STAGE 4 CHRONIC KIDNEY DISEASE, WITHOUT LONG-TERM CURRENT USE OF INSULIN (MULTI): ICD-10-CM

## 2024-02-21 DIAGNOSIS — E78.2 MIXED HYPERLIPIDEMIA: ICD-10-CM

## 2024-02-21 DIAGNOSIS — E03.9 ACQUIRED HYPOTHYROIDISM: ICD-10-CM

## 2024-02-21 PROCEDURE — 1170F FXNL STATUS ASSESSED: CPT | Performed by: FAMILY MEDICINE

## 2024-02-21 PROCEDURE — G0439 PPPS, SUBSEQ VISIT: HCPCS | Performed by: FAMILY MEDICINE

## 2024-02-21 PROCEDURE — 3074F SYST BP LT 130 MM HG: CPT | Performed by: FAMILY MEDICINE

## 2024-02-21 PROCEDURE — 1159F MED LIST DOCD IN RCRD: CPT | Performed by: FAMILY MEDICINE

## 2024-02-21 PROCEDURE — 1158F ADVNC CARE PLAN TLK DOCD: CPT | Performed by: FAMILY MEDICINE

## 2024-02-21 PROCEDURE — 1123F ACP DISCUSS/DSCN MKR DOCD: CPT | Performed by: FAMILY MEDICINE

## 2024-02-21 PROCEDURE — 1160F RVW MEDS BY RX/DR IN RCRD: CPT | Performed by: FAMILY MEDICINE

## 2024-02-21 PROCEDURE — 99214 OFFICE O/P EST MOD 30 MIN: CPT | Performed by: FAMILY MEDICINE

## 2024-02-21 PROCEDURE — 3078F DIAST BP <80 MM HG: CPT | Performed by: FAMILY MEDICINE

## 2024-02-21 ASSESSMENT — ACTIVITIES OF DAILY LIVING (ADL)
GROCERY_SHOPPING: TOTAL CARE
DRESSING: INDEPENDENT
DOING_HOUSEWORK: NEEDS ASSISTANCE
MANAGING_FINANCES: NEEDS ASSISTANCE
BATHING: INDEPENDENT
TAKING_MEDICATION: INDEPENDENT

## 2024-02-21 ASSESSMENT — ENCOUNTER SYMPTOMS
OCCASIONAL FEELINGS OF UNSTEADINESS: 1
LOSS OF SENSATION IN FEET: 0
DEPRESSION: 0

## 2024-02-21 ASSESSMENT — PATIENT HEALTH QUESTIONNAIRE - PHQ9
SUM OF ALL RESPONSES TO PHQ9 QUESTIONS 1 AND 2: 0
1. LITTLE INTEREST OR PLEASURE IN DOING THINGS: NOT AT ALL
2. FEELING DOWN, DEPRESSED OR HOPELESS: NOT AT ALL

## 2024-02-23 ENCOUNTER — HOSPITAL ENCOUNTER (OUTPATIENT)
Facility: HOSPITAL | Age: 89
Setting detail: OUTPATIENT SURGERY
Discharge: HOME | End: 2024-02-23
Attending: INTERNAL MEDICINE | Admitting: INTERNAL MEDICINE
Payer: COMMERCIAL

## 2024-02-23 ENCOUNTER — APPOINTMENT (OUTPATIENT)
Dept: CARDIOLOGY | Facility: HOSPITAL | Age: 89
End: 2024-02-23
Payer: COMMERCIAL

## 2024-02-23 VITALS
DIASTOLIC BLOOD PRESSURE: 75 MMHG | OXYGEN SATURATION: 98 % | BODY MASS INDEX: 28.43 KG/M2 | HEIGHT: 68 IN | HEART RATE: 58 BPM | TEMPERATURE: 97.3 F | WEIGHT: 187.61 LBS | SYSTOLIC BLOOD PRESSURE: 144 MMHG | RESPIRATION RATE: 18 BRPM

## 2024-02-23 DIAGNOSIS — Z95.818 STATUS POST PLACEMENT OF IMPLANTABLE LOOP RECORDER: Primary | ICD-10-CM

## 2024-02-23 LAB
ANION GAP SERPL CALC-SCNC: 13 MMOL/L (ref 10–20)
APTT PPP: 33 SECONDS (ref 27–38)
BUN SERPL-MCNC: 37 MG/DL (ref 6–23)
CALCIUM SERPL-MCNC: 8.7 MG/DL (ref 8.6–10.3)
CHLORIDE SERPL-SCNC: 110 MMOL/L (ref 98–107)
CO2 SERPL-SCNC: 18 MMOL/L (ref 21–32)
CREAT SERPL-MCNC: 1.93 MG/DL (ref 0.5–1.3)
EGFRCR SERPLBLD CKD-EPI 2021: 32 ML/MIN/1.73M*2
GLUCOSE SERPL-MCNC: 102 MG/DL (ref 74–99)
INR PPP: 1.1 (ref 0.9–1.1)
POTASSIUM SERPL-SCNC: 4.1 MMOL/L (ref 3.5–5.3)
PROTHROMBIN TIME: 12 SECONDS (ref 9.8–12.8)
SODIUM SERPL-SCNC: 137 MMOL/L (ref 136–145)

## 2024-02-23 PROCEDURE — 2500000004 HC RX 250 GENERAL PHARMACY W/ HCPCS (ALT 636 FOR OP/ED): Performed by: INTERNAL MEDICINE

## 2024-02-23 PROCEDURE — 93291 INTERROG DEV EVAL SCRMS IP: CPT | Performed by: INTERNAL MEDICINE

## 2024-02-23 PROCEDURE — 2500000001 HC RX 250 WO HCPCS SELF ADMINISTERED DRUGS (ALT 637 FOR MEDICARE OP): Performed by: INTERNAL MEDICINE

## 2024-02-23 PROCEDURE — 2500000005 HC RX 250 GENERAL PHARMACY W/O HCPCS: Performed by: INTERNAL MEDICINE

## 2024-02-23 PROCEDURE — 96372 THER/PROPH/DIAG INJ SC/IM: CPT | Performed by: INTERNAL MEDICINE

## 2024-02-23 PROCEDURE — 33286 RMVL SUBQ CAR RHYTHM MNTR: CPT | Performed by: INTERNAL MEDICINE

## 2024-02-23 PROCEDURE — C1764 EVENT RECORDER, CARDIAC: HCPCS | Performed by: INTERNAL MEDICINE

## 2024-02-23 PROCEDURE — 33285 INSJ SUBQ CAR RHYTHM MNTR: CPT | Performed by: INTERNAL MEDICINE

## 2024-02-23 PROCEDURE — 36415 COLL VENOUS BLD VENIPUNCTURE: CPT | Performed by: NURSE PRACTITIONER

## 2024-02-23 PROCEDURE — 85610 PROTHROMBIN TIME: CPT | Performed by: NURSE PRACTITIONER

## 2024-02-23 PROCEDURE — 2780000003 HC OR 278 NO HCPCS: Performed by: INTERNAL MEDICINE

## 2024-02-23 PROCEDURE — 7100000009 HC PHASE TWO TIME - INITIAL BASE CHARGE: Performed by: INTERNAL MEDICINE

## 2024-02-23 PROCEDURE — 7100000010 HC PHASE TWO TIME - EACH INCREMENTAL 1 MINUTE: Performed by: INTERNAL MEDICINE

## 2024-02-23 PROCEDURE — 2720000007 HC OR 272 NO HCPCS: Performed by: INTERNAL MEDICINE

## 2024-02-23 PROCEDURE — 82374 ASSAY BLOOD CARBON DIOXIDE: CPT | Performed by: NURSE PRACTITIONER

## 2024-02-23 PROCEDURE — 93005 ELECTROCARDIOGRAM TRACING: CPT | Mod: 59

## 2024-02-23 DEVICE — ICM LNQ22 LINQ II USA
Type: IMPLANTABLE DEVICE | Status: FUNCTIONAL
Brand: LINQ II™

## 2024-02-23 RX ORDER — CEFAZOLIN SODIUM 1 G/50ML
1 SOLUTION INTRAVENOUS ONCE
Status: DISCONTINUED | OUTPATIENT
Start: 2024-02-23 | End: 2024-02-23 | Stop reason: HOSPADM

## 2024-02-23 RX ORDER — SODIUM CHLORIDE 9 MG/ML
20 INJECTION, SOLUTION INTRAVENOUS CONTINUOUS
Status: DISCONTINUED | OUTPATIENT
Start: 2024-02-23 | End: 2024-02-23

## 2024-02-23 RX ORDER — CEFAZOLIN SODIUM 1 G/50ML
SOLUTION INTRAVENOUS CONTINUOUS PRN
Status: COMPLETED | OUTPATIENT
Start: 2024-02-23 | End: 2024-02-23

## 2024-02-23 RX ORDER — MUPIROCIN 20 MG/G
1 OINTMENT TOPICAL ONCE
Status: COMPLETED | OUTPATIENT
Start: 2024-02-23 | End: 2024-02-23

## 2024-02-23 RX ORDER — MIDAZOLAM HYDROCHLORIDE 1 MG/ML
INJECTION, SOLUTION INTRAMUSCULAR; INTRAVENOUS AS NEEDED
Status: DISCONTINUED | OUTPATIENT
Start: 2024-02-23 | End: 2024-02-23 | Stop reason: HOSPADM

## 2024-02-23 RX ORDER — LIDOCAINE HYDROCHLORIDE 10 MG/ML
INJECTION, SOLUTION EPIDURAL; INFILTRATION; INTRACAUDAL; PERINEURAL AS NEEDED
Status: DISCONTINUED | OUTPATIENT
Start: 2024-02-23 | End: 2024-02-23 | Stop reason: HOSPADM

## 2024-02-23 RX ORDER — FENTANYL CITRATE 50 UG/ML
INJECTION, SOLUTION INTRAMUSCULAR; INTRAVENOUS AS NEEDED
Status: DISCONTINUED | OUTPATIENT
Start: 2024-02-23 | End: 2024-02-23 | Stop reason: HOSPADM

## 2024-02-23 RX ORDER — ACETAMINOPHEN 325 MG/1
650 TABLET ORAL EVERY 4 HOURS PRN
Status: DISCONTINUED | OUTPATIENT
Start: 2024-02-23 | End: 2024-02-23 | Stop reason: HOSPADM

## 2024-02-23 RX ORDER — CHLORHEXIDINE GLUCONATE 40 MG/ML
SOLUTION TOPICAL ONCE
Status: COMPLETED | OUTPATIENT
Start: 2024-02-23 | End: 2024-02-23

## 2024-02-23 RX ADMIN — MUPIROCIN 1 APPLICATION: 20 OINTMENT TOPICAL at 06:23

## 2024-02-23 RX ADMIN — SODIUM CHLORIDE 20 ML/HR: 9 INJECTION, SOLUTION INTRAVENOUS at 06:24

## 2024-02-23 RX ADMIN — Medication: at 06:11

## 2024-02-23 ASSESSMENT — COLUMBIA-SUICIDE SEVERITY RATING SCALE - C-SSRS
6. HAVE YOU EVER DONE ANYTHING, STARTED TO DO ANYTHING, OR PREPARED TO DO ANYTHING TO END YOUR LIFE?: NO
1. IN THE PAST MONTH, HAVE YOU WISHED YOU WERE DEAD OR WISHED YOU COULD GO TO SLEEP AND NOT WAKE UP?: NO
2. HAVE YOU ACTUALLY HAD ANY THOUGHTS OF KILLING YOURSELF?: NO

## 2024-02-23 NOTE — DISCHARGE INSTRUCTIONS
DISCHARGE INSTRUCTIONS FOR LOOP RECORDER    ACTIVITY  DO NOT drive a car for 24 hrs.  DO NOT drive until cleared by your provider if you have had a recent passing out spell or previously restricted from driving.  DO NOT operate power equipment/heavy machinery for 24 hrs.  DO NOT sign any legal documents for 24 hrs.  DO NOT drink alcohol for 24 hrs.    WOUND CARE  DO NOT remove the water resistant dressing.  Keep the dressing clean and dry.  May shower in 24 hrs.   Avoid letting the water directly hit the wound.  May apply ice to the wound for comfort/swelling intermittently 20 mins on and 20 mins off for 24-48 hours.  May take Tylenol or Motrin for further pain relief.    CALL YOUR PHYSICIAN IMMEDIATELY FOR:  Wound edges that are gaping.  Wound that is red, swollen, painful or has foul smelling drainage.  Excessive bright red bleeding or saturated dressing.  Increased pain not controlled by medication.  Chills/fever over 100 degrees F.    REMOTE MONITORING/DEVICE INFO  You have been instructed by the device company representative regarding remote home monitoring.   Please follow the instructions provided to you about your specific device.  You will be given a temporary device ID card.  You will receive a permanent device ID card in the mail in 6-8 weeks and should keep this with you at all times.  If you have questions, please contact the [facility] device clinic for further instruction [phone].      Follow up in the office in 1 week for a wound check/dressing removal as scheduled above.  Follow up in 4 months with the Electrophysiology service as scheduled above.

## 2024-02-27 ENCOUNTER — TELEPHONE (OUTPATIENT)
Dept: PRIMARY CARE | Facility: CLINIC | Age: 89
End: 2024-02-27
Payer: COMMERCIAL

## 2024-02-27 DIAGNOSIS — M19.90 ARTHRITIS: ICD-10-CM

## 2024-02-27 DIAGNOSIS — R29.898 WEAKNESS OF EXTREMITY: Primary | ICD-10-CM

## 2024-02-27 LAB
ATRIAL RATE: 53 BPM
P AXIS: 88 DEGREES
P OFFSET: 113 MS
P ONSET: 81 MS
PR INTERVAL: 282 MS
Q ONSET: 222 MS
QRS COUNT: 9 BEATS
QRS DURATION: 86 MS
QT INTERVAL: 476 MS
QTC CALCULATION(BAZETT): 446 MS
QTC FREDERICIA: 456 MS
R AXIS: -4 DEGREES
T AXIS: 145 DEGREES
T OFFSET: 460 MS
VENTRICULAR RATE: 53 BPM

## 2024-02-27 NOTE — TELEPHONE ENCOUNTER
Mamta from [facility] called in regarding the patient. She stated the patient had a fall on 2/25. She spoke with the patient regarding this. Patient is wanting to have PT. Mamta is wondering if Dr. Dempsey could place a referral for pt and skilled nursing?  Please Advise

## 2024-02-28 ENCOUNTER — HOME HEALTH ADMISSION (OUTPATIENT)
Dept: HOME HEALTH SERVICES | Facility: HOME HEALTH | Age: 89
End: 2024-02-28
Payer: COMMERCIAL

## 2024-02-28 ENCOUNTER — DOCUMENTATION (OUTPATIENT)
Dept: HOME HEALTH SERVICES | Facility: HOME HEALTH | Age: 89
End: 2024-02-28
Payer: COMMERCIAL

## 2024-02-28 NOTE — HH CARE COORDINATION
Home Care received a Referral for Nursing and Physical Therapy. We have processed the referral for a Start of Care on 2/29/24.     If you have any questions or concerns, please feel free to contact us at [phone]. Follow the prompts, enter your five digit zip code, and you will be directed to your care team on [facility].

## 2024-02-29 ENCOUNTER — HOME CARE VISIT (OUTPATIENT)
Dept: HOME HEALTH SERVICES | Facility: HOME HEALTH | Age: 89
End: 2024-02-29
Payer: COMMERCIAL

## 2024-02-29 ENCOUNTER — CLINICAL SUPPORT (OUTPATIENT)
Dept: CARDIOLOGY | Facility: CLINIC | Age: 89
End: 2024-02-29
Payer: COMMERCIAL

## 2024-02-29 DIAGNOSIS — Z95.818 STATUS POST PLACEMENT OF IMPLANTABLE LOOP RECORDER: ICD-10-CM

## 2024-02-29 DIAGNOSIS — I49.5 SINUS NODE DYSFUNCTION (MULTI): ICD-10-CM

## 2024-02-29 DIAGNOSIS — R55 SYNCOPE AND COLLAPSE: ICD-10-CM

## 2024-02-29 PROCEDURE — G0299 HHS/HOSPICE OF RN EA 15 MIN: HCPCS

## 2024-02-29 PROCEDURE — 0023 HH SOC

## 2024-02-29 NOTE — PROGRESS NOTES
Pt. here for wound check of loop replacement by Dr. Fong on 2/23/2024 at [facility]. Sternal area is well approximated with no erythema or drainage. Pt. denies any fever or chills. Temp 98.4

## 2024-03-01 ENCOUNTER — APPOINTMENT (OUTPATIENT)
Dept: CARDIOLOGY | Facility: CLINIC | Age: 89
End: 2024-03-01
Payer: COMMERCIAL

## 2024-03-01 ENCOUNTER — HOME CARE VISIT (OUTPATIENT)
Dept: HOME HEALTH SERVICES | Facility: HOME HEALTH | Age: 89
End: 2024-03-01
Payer: COMMERCIAL

## 2024-03-01 ENCOUNTER — HOSPITAL ENCOUNTER (OUTPATIENT)
Dept: CARDIOLOGY | Facility: HOSPITAL | Age: 89
Discharge: HOME | End: 2024-03-01
Payer: COMMERCIAL

## 2024-03-01 VITALS
HEART RATE: 58 BPM | DIASTOLIC BLOOD PRESSURE: 52 MMHG | SYSTOLIC BLOOD PRESSURE: 115 MMHG | OXYGEN SATURATION: 99 % | RESPIRATION RATE: 20 BRPM | TEMPERATURE: 98.6 F

## 2024-03-01 DIAGNOSIS — Z95.818 STATUS POST PLACEMENT OF IMPLANTABLE LOOP RECORDER: Primary | ICD-10-CM

## 2024-03-01 DIAGNOSIS — Z95.818 STATUS POST PLACEMENT OF IMPLANTABLE LOOP RECORDER: ICD-10-CM

## 2024-03-01 DIAGNOSIS — R55 SYNCOPE AND COLLAPSE: ICD-10-CM

## 2024-03-01 PROCEDURE — G0151 HHCP-SERV OF PT,EA 15 MIN: HCPCS

## 2024-03-01 ASSESSMENT — ENCOUNTER SYMPTOMS
PERSON REPORTING PAIN: PATIENT
DENIES PAIN: 1

## 2024-03-02 SDOH — HEALTH STABILITY: PHYSICAL HEALTH: EXERCISE TYPE: MODIFIED SUPINE, SEATED, STANDING LE STRENGTHENING EX'S

## 2024-03-02 ASSESSMENT — ENCOUNTER SYMPTOMS
PERSON REPORTING PAIN: PATIENT
DENIES PAIN: 1

## 2024-03-02 ASSESSMENT — BALANCE ASSESSMENTS
NUDGED SCORE: 0
ARISING SCORE: 0
IMMEDIATE STANDING BALANCE FIRST 5 SECONDS: 0 - UNSTEADY (STAGGERS, MOVES FEET, TRUNK SWAY)
EYES CLOSED AT MAXIMUM POSITION NUDGED: 0 - UNSTEADY
SITTING BALANCE: 1 - STEADY, SAFE
ATTEMPTS TO ARISE: 0 - UNABLE WITHOUT HELP
ARISES: 0 - UNABLE WITHOUT HELP
SITTING DOWN: 0 - UNSAFE (MISJUDGES DISTANCE, FALLS INTO CHAIR)
NUDGED: 0 - BEGINS TO FALL
TURNING 360 DEGREES STEPS: 0 - DISCONTINUOUS STEPS
BALANCE SCORE: 2
STANDING BALANCE: 1 - STEADY BUT WIDE STANCE AND USES CANE OR OTHER SUPPORT

## 2024-03-02 ASSESSMENT — GAIT ASSESSMENTS
PATH: 1 - MILD/MODERATE DEVIATION OR USES WALKING AID
WALKING STANCE: 0 - HEELS APART
TRUNK: 0 - MARKED SWAY OR USES WALKING AID
BALANCE AND GAIT SCORE: 3
PATH SCORE: 1
STEP CONTINUITY: 0 - STOPPING OR DISCONTINUITY BETWEEN STEPS
INITIATION OF GAIT IMMEDIATELY AFTER GO: 0 - ANY HESITANCY OR MULTIPLE ATTEMPTS TO START
TRUNK SCORE: 0
GAIT SCORE: 1
STEP SYMMETRY: 0 - RIGHT AND LEFT STEP LENGTH NOT EQUAL

## 2024-03-02 ASSESSMENT — ACTIVITIES OF DAILY LIVING (ADL): AMBULATION_DISTANCE/DURATION_TOLERATED: 10'

## 2024-03-03 SDOH — ECONOMIC STABILITY: HOUSING INSECURITY: HOME SAFETY: ESITY, MI, CARDIAC CATH WITH STENT PLACEMENT.

## 2024-03-03 SDOH — ECONOMIC STABILITY: HOUSING INSECURITY

## 2024-03-04 ENCOUNTER — HOME CARE VISIT (OUTPATIENT)
Dept: HOME HEALTH SERVICES | Facility: HOME HEALTH | Age: 89
End: 2024-03-04
Payer: COMMERCIAL

## 2024-03-04 PROCEDURE — G0151 HHCP-SERV OF PT,EA 15 MIN: HCPCS

## 2024-03-04 ASSESSMENT — ENCOUNTER SYMPTOMS
PAIN LOCATION - PAIN SEVERITY: 6/10
FREQUENCY: 1
LOWEST PAIN SEVERITY IN PAST 24 HOURS: 4/10
PAIN LOCATION - RELIEVING FACTORS: REST, POSITIONING
PAIN LOCATION - EXACERBATING FACTORS: WT BEARING
CHANGE IN APPETITE: UNCHANGED
PAIN SEVERITY GOAL: 2/10
SUBJECTIVE PAIN PROGRESSION: WAXING AND WANING
APPETITE LEVEL: GOOD
PAIN LOCATION - PAIN FREQUENCY: CONSTANT
HIGHEST PAIN SEVERITY IN PAST 24 HOURS: 8/10
PAIN LOCATION - PAIN DURATION: CONSTANT
PERSON REPORTING PAIN: PATIENT
PAIN LOCATION: RIGHT KNEE
PAIN LOCATION - PAIN QUALITY: ACHING
PAIN: 1
MUSCLE WEAKNESS: 1

## 2024-03-04 ASSESSMENT — ACTIVITIES OF DAILY LIVING (ADL)
ENTERING_EXITING_HOME: MODERATE ASSIST
OASIS_M1830: 03

## 2024-03-05 ENCOUNTER — HOME CARE VISIT (OUTPATIENT)
Dept: HOME HEALTH SERVICES | Facility: HOME HEALTH | Age: 89
End: 2024-03-05
Payer: COMMERCIAL

## 2024-03-07 ENCOUNTER — HOME CARE VISIT (OUTPATIENT)
Dept: HOME HEALTH SERVICES | Facility: HOME HEALTH | Age: 89
End: 2024-03-07
Payer: COMMERCIAL

## 2024-03-07 VITALS
SYSTOLIC BLOOD PRESSURE: 142 MMHG | RESPIRATION RATE: 20 BRPM | DIASTOLIC BLOOD PRESSURE: 68 MMHG | OXYGEN SATURATION: 100 % | TEMPERATURE: 97.5 F | HEART RATE: 61 BPM

## 2024-03-07 VITALS
RESPIRATION RATE: 18 BRPM | HEART RATE: 68 BPM | DIASTOLIC BLOOD PRESSURE: 58 MMHG | SYSTOLIC BLOOD PRESSURE: 110 MMHG | TEMPERATURE: 97.7 F | OXYGEN SATURATION: 98 %

## 2024-03-07 PROCEDURE — G0151 HHCP-SERV OF PT,EA 15 MIN: HCPCS

## 2024-03-07 PROCEDURE — G0152 HHCP-SERV OF OT,EA 15 MIN: HCPCS

## 2024-03-07 PROCEDURE — G0299 HHS/HOSPICE OF RN EA 15 MIN: HCPCS

## 2024-03-07 SDOH — ECONOMIC STABILITY: HOUSING INSECURITY
HOME SAFETY: 92 YO MALE S/P LOOP RECORDER PLACMENT SECONDARY TO LOW HR. PATIENT LIVES ALONE IN A 1SH, HAS HHA 4 DAYS A WEEK (2 HOURS) ASSISTING WITH HOME MANAGMENT. PATIENT PRIMARILY COMPLETING ADLS, HOWEVER HHA EXPRESSES SAFETY CONCERNS. PATIENT RELUCTUANT TO HA

## 2024-03-07 SDOH — ECONOMIC STABILITY: HOUSING INSECURITY
HOME SAFETY: VE ASSISTANCE WITH SELF CARE. REQUIRES REMINDERS TO SHOWER AS PATIENT IS OFTEN UNAWARE OF PERSONAL HYGIENE AT TIMES.   PATIENT WILL BENEFIT FROM CONTINUED OT SERVICES 1W1, 2W2, 1W1 TO ADDRESS SAFETY WITH SHOWER TRANSFERS, HOME SAFETY AND FALL PREVENT

## 2024-03-07 SDOH — ECONOMIC STABILITY: HOUSING INSECURITY: HOME SAFETY: ION, UE STRENGTHENING.   HHA: M 5-7, T 9-12, TH 9-12, F 5-7

## 2024-03-07 ASSESSMENT — ACTIVITIES OF DAILY LIVING (ADL)
TOILETING: 1
HAIR_CARE_ASSESSED: 1
DRESSING_LB_CURRENT_FUNCTION: SUPERVISION
AMBULATION ASSISTANCE: 1
BATHING ASSESSED: 1
DRESSING_UB_CURRENT_FUNCTION: SUPERVISION
WASHING_LB_CURRENT_FUNCTION: SUPERVISION
FEEDING_WITHIN_DEFINED_LIMITS: 1
HAIR_CARE_SUPERVISION: 1
GROOMING ASSESSED: 1
GROOMING_CURRENT_FUNCTION: SUPERVISION
WASHING_UPB_CURRENT_FUNCTION: SUPERVISION
TOILETING: SUPERVISION
AMBULATION ASSISTANCE: SUPERVISION
BATHING_CURRENT_FUNCTION: SUPERVISION

## 2024-03-07 ASSESSMENT — ENCOUNTER SYMPTOMS
SUBJECTIVE PAIN PROGRESSION: WAXING AND WANING
PERSON REPORTING PAIN: PATIENT
PAIN: 1
LOWEST PAIN SEVERITY IN PAST 24 HOURS: 2/10
PAIN SEVERITY GOAL: 0/10
PERSON REPORTING PAIN: PATIENT
PAIN: 1
PAIN LOCATION - PAIN SEVERITY: 4/10
MUSCLE WEAKNESS: 1
PAIN LOCATION: RIGHT KNEE
HIGHEST PAIN SEVERITY IN PAST 24 HOURS: 6/10
FREQUENCY: 1
PAIN LOCATION - RELIEVING FACTORS: REST, POSITIONING
PAIN LOCATION - PAIN QUALITY: DULL
CHANGE IN APPETITE: UNCHANGED
PAIN LOCATION - PAIN FREQUENCY: INTERMITTENT
PAIN LOCATION - PAIN DURATION: VARIABLE
DENIES PAIN: 1
APPETITE LEVEL: GOOD
PAIN LOCATION - EXACERBATING FACTORS: STANDING

## 2024-03-12 ENCOUNTER — HOME CARE VISIT (OUTPATIENT)
Dept: HOME HEALTH SERVICES | Facility: HOME HEALTH | Age: 89
End: 2024-03-12
Payer: COMMERCIAL

## 2024-03-12 PROCEDURE — G0151 HHCP-SERV OF PT,EA 15 MIN: HCPCS

## 2024-03-12 ASSESSMENT — ENCOUNTER SYMPTOMS
PERSON REPORTING PAIN: PATIENT
PAIN SEVERITY GOAL: 0/10
HIGHEST PAIN SEVERITY IN PAST 24 HOURS: 5/10
PAIN LOCATION - PAIN QUALITY: ACHING
PAIN LOCATION: RIGHT KNEE
LOWEST PAIN SEVERITY IN PAST 24 HOURS: 0/10
PAIN LOCATION - PAIN SEVERITY: 4/10
PAIN LOCATION - PAIN FREQUENCY: INTERMITTENT
PAIN: 1
PAIN LOCATION - EXACERBATING FACTORS: WT BEARING ACTIVITIES
PAIN LOCATION - RELIEVING FACTORS: REST, POSITIONING
PAIN LOCATION - PAIN DURATION: VARIABLE
SUBJECTIVE PAIN PROGRESSION: WAXING AND WANING

## 2024-03-13 ENCOUNTER — HOME CARE VISIT (OUTPATIENT)
Dept: HOME HEALTH SERVICES | Facility: HOME HEALTH | Age: 89
End: 2024-03-13
Payer: COMMERCIAL

## 2024-03-13 VITALS
DIASTOLIC BLOOD PRESSURE: 54 MMHG | RESPIRATION RATE: 18 BRPM | TEMPERATURE: 97.7 F | SYSTOLIC BLOOD PRESSURE: 118 MMHG | HEART RATE: 66 BPM | OXYGEN SATURATION: 96 %

## 2024-03-13 PROCEDURE — G0152 HHCP-SERV OF OT,EA 15 MIN: HCPCS

## 2024-03-13 ASSESSMENT — ENCOUNTER SYMPTOMS: DENIES PAIN: 1

## 2024-03-14 ENCOUNTER — HOME CARE VISIT (OUTPATIENT)
Dept: HOME HEALTH SERVICES | Facility: HOME HEALTH | Age: 89
End: 2024-03-14
Payer: COMMERCIAL

## 2024-03-14 PROCEDURE — G0151 HHCP-SERV OF PT,EA 15 MIN: HCPCS

## 2024-03-14 SDOH — ECONOMIC STABILITY: HOUSING INSECURITY
HOME SAFETY: DISCUSSED THE 2 LARGE AREA RUGS IN PATIENT LIVING ROOM THAT PRESENT AS TRIPPING HAZARDS DUE TO MULTIPLE ROLLS AND WRINKLES AND THEY DO NOT STAY SECURED IN PLACE, REQUIRING SOMEONE TO CONSISTENTLY FIX, MOVE AND SMOOTH OUT WRINKLES. DISCUESSED THIS ISS

## 2024-03-14 SDOH — ECONOMIC STABILITY: HOUSING INSECURITY
HOME SAFETY: UE IN THE PAST, HOWEVER, PATIENT IS ADAMANT ABOUT KEEPING RUGS IN THE LIVING ROOM AND DECLINES TO HAVE THEM REMOVED DESPITE EXTENSIVE EDUATION.

## 2024-03-14 ASSESSMENT — ENCOUNTER SYMPTOMS
PAIN SEVERITY GOAL: 0/10
PAIN LOCATION: RIGHT KNEE
PAIN LOCATION - PAIN DURATION: VARIABLE
SUBJECTIVE PAIN PROGRESSION: WAXING AND WANING
PAIN LOCATION - PAIN FREQUENCY: WITH ACTIVITY
PAIN LOCATION - EXACERBATING FACTORS: WT BEARING ACTIVITIES
PAIN: 1
HIGHEST PAIN SEVERITY IN PAST 24 HOURS: 4/10
PAIN LOCATION - PAIN SEVERITY: 4/10
LOWEST PAIN SEVERITY IN PAST 24 HOURS: 0/10
PAIN LOCATION - PAIN QUALITY: ACHING
PERSON REPORTING PAIN: PATIENT
PAIN LOCATION - RELIEVING FACTORS: REST, POSITIONING

## 2024-03-14 ASSESSMENT — ACTIVITIES OF DAILY LIVING (ADL)
AMBULATION ASSISTANCE: SUPERVISION
AMBULATION_DISTANCE/DURATION_TOLERATED: 50'
AMBULATION ASSISTANCE: 1

## 2024-03-15 ENCOUNTER — HOME CARE VISIT (OUTPATIENT)
Dept: HOME HEALTH SERVICES | Facility: HOME HEALTH | Age: 89
End: 2024-03-15
Payer: COMMERCIAL

## 2024-03-15 VITALS
DIASTOLIC BLOOD PRESSURE: 52 MMHG | RESPIRATION RATE: 18 BRPM | TEMPERATURE: 98.8 F | SYSTOLIC BLOOD PRESSURE: 110 MMHG | OXYGEN SATURATION: 98 % | HEART RATE: 70 BPM

## 2024-03-15 PROCEDURE — G0152 HHCP-SERV OF OT,EA 15 MIN: HCPCS

## 2024-03-15 ASSESSMENT — ACTIVITIES OF DAILY LIVING (ADL)
AMBULATION ASSISTANCE: 1
AMBULATION ASSISTANCE: SUPERVISION

## 2024-03-15 ASSESSMENT — ENCOUNTER SYMPTOMS: DENIES PAIN: 1

## 2024-03-16 ENCOUNTER — HOME CARE VISIT (OUTPATIENT)
Dept: HOME HEALTH SERVICES | Facility: HOME HEALTH | Age: 89
End: 2024-03-16
Payer: COMMERCIAL

## 2024-03-16 VITALS
SYSTOLIC BLOOD PRESSURE: 120 MMHG | DIASTOLIC BLOOD PRESSURE: 56 MMHG | RESPIRATION RATE: 18 BRPM | OXYGEN SATURATION: 98 % | HEART RATE: 49 BPM

## 2024-03-16 PROCEDURE — G0300 HHS/HOSPICE OF LPN EA 15 MIN: HCPCS

## 2024-03-16 SDOH — ECONOMIC STABILITY: GENERAL

## 2024-03-16 ASSESSMENT — ENCOUNTER SYMPTOMS
OCCASIONAL FEELINGS OF UNSTEADINESS: 1
DENIES PAIN: 1
CHANGE IN APPETITE: UNCHANGED
APPETITE LEVEL: GOOD
LOSS OF SENSATION IN FEET: 0
DEPRESSION: 0

## 2024-03-16 ASSESSMENT — ACTIVITIES OF DAILY LIVING (ADL)
BATHING_CURRENT_FUNCTION: INDEPENDENT
FEEDING ASSESSED: 1
DRESSING_UB_CURRENT_FUNCTION: INDEPENDENT
FEEDING: INDEPENDENT
AMBULATION ASSISTANCE: 1
DRESSING_LB_CURRENT_FUNCTION: INDEPENDENT
BATHING ASSESSED: 1
TOILETING: 1
MONEY MANAGEMENT (EXPENSES/BILLS): INDEPENDENT
TOILETING: INDEPENDENT
AMBULATION ASSISTANCE: INDEPENDENT

## 2024-03-16 NOTE — HOME HEALTH
Patient was seen for routine nursing visit. Patient is doing well at this time. No falls since last visit. Patient has had medication adjustments, has been removing pills from pack as instructed. No further concerns at this time.

## 2024-03-18 ENCOUNTER — HOME CARE VISIT (OUTPATIENT)
Dept: HOME HEALTH SERVICES | Facility: HOME HEALTH | Age: 89
End: 2024-03-18
Payer: COMMERCIAL

## 2024-03-18 PROCEDURE — G0151 HHCP-SERV OF PT,EA 15 MIN: HCPCS

## 2024-03-18 SDOH — HEALTH STABILITY: PHYSICAL HEALTH: EXERCISE TYPE: SEATED, MODIFIED SUPINE LE STRENGTHENING EX'S

## 2024-03-18 ASSESSMENT — BALANCE ASSESSMENTS
ATTEMPTS TO ARISE: 2 - ABLE TO RISE, ONE ATTEMPT
ARISING SCORE: 1
SITTING DOWN: 0 - UNSAFE (MISJUDGES DISTANCE, FALLS INTO CHAIR)
ARISES: 1 - ABLE, USES ARMS TO HELP
EYES CLOSED AT MAXIMUM POSITION NUDGED: 0 - UNSTEADY
IMMEDIATE STANDING BALANCE FIRST 5 SECONDS: 1 - STEADY BUT USES WALKER OR OTHER SUPPORT
SITTING BALANCE: 1 - STEADY, SAFE
STANDING BALANCE: 1 - STEADY BUT WIDE STANCE AND USES CANE OR OTHER SUPPORT
NUDGED: 1 - STAGGERS, GRABS, CATCHES SELF
BALANCE SCORE: 8
TURNING 360 DEGREES STEPS: 0 - DISCONTINUOUS STEPS
NUDGED SCORE: 1

## 2024-03-18 ASSESSMENT — ENCOUNTER SYMPTOMS
PAIN LOCATION - PAIN FREQUENCY: INTERMITTENT
LOWEST PAIN SEVERITY IN PAST 24 HOURS: 0/10
PAIN LOCATION - PAIN QUALITY: DULL
PAIN LOCATION - RELIEVING FACTORS: SITTING, REST
PAIN LOCATION: RIGHT KNEE
PERSON REPORTING PAIN: PATIENT
MUSCLE WEAKNESS: 1
HIGHEST PAIN SEVERITY IN PAST 24 HOURS: 5/10
PAIN LOCATION - PAIN DURATION: WITH ACTIVITY
PAIN: 1
ARTHRALGIAS: 1
PAIN LOCATION - EXACERBATING FACTORS: STANDING ACTIVITY
SUBJECTIVE PAIN PROGRESSION: WAXING AND WANING
PAIN SEVERITY GOAL: 0/10
PAIN LOCATION - PAIN SEVERITY: 0/10

## 2024-03-18 ASSESSMENT — GAIT ASSESSMENTS
GAIT SCORE: 6
TRUNK SCORE: 0
WALKING STANCE: 0 - HEELS APART
PATH SCORE: 1
TRUNK: 0 - MARKED SWAY OR USES WALKING AID
STEP SYMMETRY: 1 - RIGHT AND LEFT STEP LENGTH APPEAR EQUAL
PATH: 1 - MILD/MODERATE DEVIATION OR USES WALKING AID
BALANCE AND GAIT SCORE: 14
INITIATION OF GAIT IMMEDIATELY AFTER GO: 1 - NO HESITANCY
STEP CONTINUITY: 1 - STEPS APPEAR CONTINUOUS

## 2024-03-18 ASSESSMENT — ACTIVITIES OF DAILY LIVING (ADL): AMBULATION_DISTANCE/DURATION_TOLERATED: 100'

## 2024-03-20 ENCOUNTER — HOME CARE VISIT (OUTPATIENT)
Dept: HOME HEALTH SERVICES | Facility: HOME HEALTH | Age: 89
End: 2024-03-20
Payer: COMMERCIAL

## 2024-03-20 VITALS
DIASTOLIC BLOOD PRESSURE: 52 MMHG | HEART RATE: 60 BPM | SYSTOLIC BLOOD PRESSURE: 120 MMHG | RESPIRATION RATE: 18 BRPM | TEMPERATURE: 98.5 F | OXYGEN SATURATION: 97 %

## 2024-03-20 PROCEDURE — G0152 HHCP-SERV OF OT,EA 15 MIN: HCPCS

## 2024-03-20 ASSESSMENT — ENCOUNTER SYMPTOMS: DENIES PAIN: 1

## 2024-03-22 ENCOUNTER — HOME CARE VISIT (OUTPATIENT)
Dept: HOME HEALTH SERVICES | Facility: HOME HEALTH | Age: 89
End: 2024-03-22
Payer: COMMERCIAL

## 2024-03-22 VITALS
HEART RATE: 60 BPM | DIASTOLIC BLOOD PRESSURE: 68 MMHG | OXYGEN SATURATION: 98 % | SYSTOLIC BLOOD PRESSURE: 128 MMHG | TEMPERATURE: 97.5 F | RESPIRATION RATE: 18 BRPM

## 2024-03-22 PROCEDURE — G0152 HHCP-SERV OF OT,EA 15 MIN: HCPCS

## 2024-03-22 ASSESSMENT — ACTIVITIES OF DAILY LIVING (ADL)
DRESSING_LB_CURRENT_FUNCTION: INDEPENDENT
DRESSING_UB_CURRENT_FUNCTION: INDEPENDENT

## 2024-03-22 ASSESSMENT — ENCOUNTER SYMPTOMS: DENIES PAIN: 1

## 2024-03-26 ENCOUNTER — HOME CARE VISIT (OUTPATIENT)
Dept: HOME HEALTH SERVICES | Facility: HOME HEALTH | Age: 89
End: 2024-03-26
Payer: COMMERCIAL

## 2024-03-26 VITALS
SYSTOLIC BLOOD PRESSURE: 118 MMHG | DIASTOLIC BLOOD PRESSURE: 60 MMHG | RESPIRATION RATE: 18 BRPM | HEART RATE: 64 BPM | TEMPERATURE: 98.1 F | OXYGEN SATURATION: 98 %

## 2024-03-26 PROCEDURE — G0152 HHCP-SERV OF OT,EA 15 MIN: HCPCS

## 2024-03-26 ASSESSMENT — ACTIVITIES OF DAILY LIVING (ADL)
LAUNDRY ASSESSED: 1
DRESSING_LB_CURRENT_FUNCTION: SUPERVISION
GROOMING_CURRENT_FUNCTION: SUPERVISION
HOME_HEALTH_OASIS: 01
LAUNDRY: NEEDS ASSISTANCE
AMBULATION ASSISTANCE: 1
DRESSING_UB_CURRENT_FUNCTION: SUPERVISION
USING_UTENSILS_CURRENT_FUNCTIONINDEPENDENT: 1
TOILETING: SUPERVISION
TOILETING: 1
LAUNDRY EQUIPMENT USED: POWER WHEELCHAIR
SPONGING_UB_CURRENT_FUNCTION: SUPERVISION
SPONGING_LB_CURRENT_FUNCTION: SUPERVISION
AMBULATION ASSISTANCE: SUPERVISION
DRINKING_FROM_CUP_CURRENT_FUNCTIONINDEPENDENT: 1
OASIS_M1830: 03
GROOMING ASSESSED: 1

## 2024-03-26 ASSESSMENT — ENCOUNTER SYMPTOMS
LOWEST PAIN SEVERITY IN PAST 24 HOURS: 6/10
PAIN SEVERITY GOAL: 0/10
DEPRESSION: 1
SUBJECTIVE PAIN PROGRESSION: UNCHANGED
LOSS OF SENSATION IN FEET: 0
PAIN LOCATION - PAIN SEVERITY: 6/10
PAIN LOCATION - PAIN QUALITY: ACHING
PERSON REPORTING PAIN: PATIENT
PAIN LOCATION - PAIN FREQUENCY: INTERMITTENT
PAIN: 1
FORGETFULNESS: 1
DESCRIPTION OF MEMORY LOSS: SHORT TERM
HIGHEST PAIN SEVERITY IN PAST 24 HOURS: 6/10
PAIN LOCATION: RIGHT KNEE

## 2024-03-29 ENCOUNTER — HOSPITAL ENCOUNTER (OUTPATIENT)
Dept: CARDIOLOGY | Facility: HOSPITAL | Age: 89
Discharge: HOME | End: 2024-03-29
Payer: COMMERCIAL

## 2024-03-29 DIAGNOSIS — Z95.818 STATUS POST PLACEMENT OF IMPLANTABLE LOOP RECORDER: ICD-10-CM

## 2024-03-29 DIAGNOSIS — R55 SYNCOPE AND COLLAPSE: ICD-10-CM

## 2024-03-29 PROCEDURE — 93298 REM INTERROG DEV EVAL SCRMS: CPT

## 2024-03-29 PROCEDURE — 93298 REM INTERROG DEV EVAL SCRMS: CPT | Performed by: INTERNAL MEDICINE

## 2024-04-05 ENCOUNTER — HOSPITAL ENCOUNTER (OUTPATIENT)
Dept: CARDIOLOGY | Facility: HOSPITAL | Age: 89
Discharge: HOME | End: 2024-04-05
Payer: COMMERCIAL

## 2024-04-05 DIAGNOSIS — R55 SYNCOPE AND COLLAPSE: ICD-10-CM

## 2024-04-05 DIAGNOSIS — Z95.818 STATUS POST PLACEMENT OF IMPLANTABLE LOOP RECORDER: ICD-10-CM

## 2024-04-12 ENCOUNTER — HOSPITAL ENCOUNTER (OUTPATIENT)
Dept: CARDIOLOGY | Facility: HOSPITAL | Age: 89
Discharge: HOME | End: 2024-04-12
Payer: COMMERCIAL

## 2024-04-12 DIAGNOSIS — Z95.818 STATUS POST PLACEMENT OF IMPLANTABLE LOOP RECORDER: ICD-10-CM

## 2024-04-12 DIAGNOSIS — R55 SYNCOPE AND COLLAPSE: ICD-10-CM

## 2024-04-19 ENCOUNTER — HOSPITAL ENCOUNTER (OUTPATIENT)
Dept: CARDIOLOGY | Facility: HOSPITAL | Age: 89
Discharge: HOME | End: 2024-04-19
Payer: COMMERCIAL

## 2024-04-19 DIAGNOSIS — R55 SYNCOPE AND COLLAPSE: ICD-10-CM

## 2024-04-19 DIAGNOSIS — Z95.818 STATUS POST PLACEMENT OF IMPLANTABLE LOOP RECORDER: ICD-10-CM

## 2024-04-19 DIAGNOSIS — R60.9 PERIPHERAL EDEMA: ICD-10-CM

## 2024-04-22 RX ORDER — POTASSIUM CHLORIDE 600 MG/1
8 CAPSULE, EXTENDED RELEASE ORAL DAILY
Qty: 30 CAPSULE | Refills: 1 | Status: SHIPPED | OUTPATIENT
Start: 2024-04-22 | End: 2024-05-10

## 2024-04-22 NOTE — TELEPHONE ENCOUNTER
Recent Visits  Date Type Provider Dept   02/21/24 Office Visit Juan Jose Dempsey MD Do [id number] [facility]   11/06/23 Office Visit Juan Jose Dempsey MD Do [id number] [facility]   10/23/23 Office Visit Juan Jose Dempsey MD Do [id number] [facility]   07/20/23 Office Visit Juan Jose Dempsey MD Do [id number] [facility]   05/10/23 Office Visit Juan Jose Dempsey MD Do [id number] [facility]   04/10/23 Office Visit Juan Jose Dempsey MD Do [id number] [facility]   Showing recent visits within past 540 days and meeting all other requirements  Future Appointments  Date Type Provider Dept   05/21/24 Appointment Juan Jose Dempsey MD Do [id number] [facility]   Showing future appointments within next 180 days and meeting all other requirements

## 2024-04-26 ENCOUNTER — HOSPITAL ENCOUNTER (OUTPATIENT)
Dept: CARDIOLOGY | Facility: HOSPITAL | Age: 89
Discharge: HOME | End: 2024-04-26
Payer: COMMERCIAL

## 2024-04-26 DIAGNOSIS — Z95.818 STATUS POST PLACEMENT OF IMPLANTABLE LOOP RECORDER: ICD-10-CM

## 2024-04-26 DIAGNOSIS — R55 SYNCOPE AND COLLAPSE: ICD-10-CM

## 2024-05-03 ENCOUNTER — HOSPITAL ENCOUNTER (OUTPATIENT)
Dept: CARDIOLOGY | Facility: HOSPITAL | Age: 89
Discharge: HOME | End: 2024-05-03
Payer: COMMERCIAL

## 2024-05-03 DIAGNOSIS — Z95.818 STATUS POST PLACEMENT OF IMPLANTABLE LOOP RECORDER: ICD-10-CM

## 2024-05-03 DIAGNOSIS — R55 SYNCOPE AND COLLAPSE: ICD-10-CM

## 2024-05-03 PROCEDURE — 93298 REM INTERROG DEV EVAL SCRMS: CPT | Performed by: INTERNAL MEDICINE

## 2024-05-03 PROCEDURE — 93298 REM INTERROG DEV EVAL SCRMS: CPT

## 2024-05-09 DIAGNOSIS — E03.9 HYPOTHYROIDISM, UNSPECIFIED TYPE: ICD-10-CM

## 2024-05-09 DIAGNOSIS — R60.9 PERIPHERAL EDEMA: ICD-10-CM

## 2024-05-09 DIAGNOSIS — N32.81 OVERACTIVE BLADDER: ICD-10-CM

## 2024-05-09 DIAGNOSIS — F32.4 MAJOR DEPRESSIVE DISORDER WITH SINGLE EPISODE, IN PARTIAL REMISSION (CMS-HCC): ICD-10-CM

## 2024-05-10 ENCOUNTER — HOSPITAL ENCOUNTER (OUTPATIENT)
Dept: CARDIOLOGY | Facility: HOSPITAL | Age: 89
Discharge: HOME | End: 2024-05-10
Payer: COMMERCIAL

## 2024-05-10 DIAGNOSIS — Z95.818 STATUS POST PLACEMENT OF IMPLANTABLE LOOP RECORDER: ICD-10-CM

## 2024-05-10 DIAGNOSIS — R55 SYNCOPE AND COLLAPSE: ICD-10-CM

## 2024-05-10 RX ORDER — POTASSIUM CHLORIDE 600 MG/1
8 CAPSULE, EXTENDED RELEASE ORAL DAILY
Qty: 30 CAPSULE | Refills: 1 | Status: SHIPPED | OUTPATIENT
Start: 2024-05-10

## 2024-05-10 RX ORDER — CITALOPRAM 20 MG/1
TABLET, FILM COATED ORAL
Qty: 90 TABLET | Refills: 1 | Status: SHIPPED | OUTPATIENT
Start: 2024-05-10

## 2024-05-10 RX ORDER — LEVOTHYROXINE SODIUM 50 UG/1
TABLET ORAL
Qty: 90 TABLET | Refills: 0 | Status: SHIPPED | OUTPATIENT
Start: 2024-05-10

## 2024-05-10 RX ORDER — OXYBUTYNIN CHLORIDE 10 MG/1
TABLET, EXTENDED RELEASE ORAL
Qty: 30 TABLET | Refills: 5 | Status: SHIPPED | OUTPATIENT
Start: 2024-05-10

## 2024-05-16 ENCOUNTER — LAB (OUTPATIENT)
Dept: LAB | Facility: LAB | Age: 89
End: 2024-05-16
Payer: COMMERCIAL

## 2024-05-16 DIAGNOSIS — E03.9 ACQUIRED HYPOTHYROIDISM: ICD-10-CM

## 2024-05-16 DIAGNOSIS — I10 PRIMARY HYPERTENSION: ICD-10-CM

## 2024-05-16 DIAGNOSIS — N18.4 TYPE 2 DIABETES MELLITUS WITH STAGE 4 CHRONIC KIDNEY DISEASE, WITHOUT LONG-TERM CURRENT USE OF INSULIN (MULTI): ICD-10-CM

## 2024-05-16 DIAGNOSIS — E11.22 TYPE 2 DIABETES MELLITUS WITH STAGE 4 CHRONIC KIDNEY DISEASE, WITHOUT LONG-TERM CURRENT USE OF INSULIN (MULTI): ICD-10-CM

## 2024-05-16 LAB
ALBUMIN SERPL BCP-MCNC: 3.5 G/DL (ref 3.4–5)
ALP SERPL-CCNC: 124 U/L (ref 33–136)
ALT SERPL W P-5'-P-CCNC: 11 U/L (ref 10–52)
ANION GAP SERPL CALC-SCNC: 11 MMOL/L (ref 10–20)
AST SERPL W P-5'-P-CCNC: 13 U/L (ref 9–39)
BASOPHILS # BLD AUTO: 0.03 X10*3/UL (ref 0–0.1)
BASOPHILS NFR BLD AUTO: 0.6 %
BILIRUB SERPL-MCNC: 0.8 MG/DL (ref 0–1.2)
BUN SERPL-MCNC: 68 MG/DL (ref 6–23)
CALCIUM SERPL-MCNC: 8.6 MG/DL (ref 8.6–10.3)
CHLORIDE SERPL-SCNC: 110 MMOL/L (ref 98–107)
CHOLEST SERPL-MCNC: 89 MG/DL (ref 0–199)
CHOLESTEROL/HDL RATIO: 2.6
CO2 SERPL-SCNC: 22 MMOL/L (ref 21–32)
CREAT SERPL-MCNC: 2.23 MG/DL (ref 0.5–1.3)
EGFRCR SERPLBLD CKD-EPI 2021: 27 ML/MIN/1.73M*2
EOSINOPHIL # BLD AUTO: 0.12 X10*3/UL (ref 0–0.4)
EOSINOPHIL NFR BLD AUTO: 2.6 %
ERYTHROCYTE [DISTWIDTH] IN BLOOD BY AUTOMATED COUNT: 14.4 % (ref 11.5–14.5)
EST. AVERAGE GLUCOSE BLD GHB EST-MCNC: 128 MG/DL
GLUCOSE SERPL-MCNC: 169 MG/DL (ref 74–99)
HBA1C MFR BLD: 6.1 %
HCT VFR BLD AUTO: 36.3 % (ref 41–52)
HDLC SERPL-MCNC: 34.4 MG/DL
HGB BLD-MCNC: 11.6 G/DL (ref 13.5–17.5)
IMM GRANULOCYTES # BLD AUTO: 0.01 X10*3/UL (ref 0–0.5)
IMM GRANULOCYTES NFR BLD AUTO: 0.2 % (ref 0–0.9)
LDLC SERPL CALC-MCNC: 43 MG/DL
LYMPHOCYTES # BLD AUTO: 1.6 X10*3/UL (ref 0.8–3)
LYMPHOCYTES NFR BLD AUTO: 34.5 %
MCH RBC QN AUTO: 30.9 PG (ref 26–34)
MCHC RBC AUTO-ENTMCNC: 32 G/DL (ref 32–36)
MCV RBC AUTO: 97 FL (ref 80–100)
MONOCYTES # BLD AUTO: 0.17 X10*3/UL (ref 0.05–0.8)
MONOCYTES NFR BLD AUTO: 3.7 %
NEUTROPHILS # BLD AUTO: 2.71 X10*3/UL (ref 1.6–5.5)
NEUTROPHILS NFR BLD AUTO: 58.4 %
NON HDL CHOLESTEROL: 55 MG/DL (ref 0–149)
NRBC BLD-RTO: 0 /100 WBCS (ref 0–0)
PLATELET # BLD AUTO: 132 X10*3/UL (ref 150–450)
POTASSIUM SERPL-SCNC: 4.6 MMOL/L (ref 3.5–5.3)
PROT SERPL-MCNC: 5.5 G/DL (ref 6.4–8.2)
RBC # BLD AUTO: 3.76 X10*6/UL (ref 4.5–5.9)
SODIUM SERPL-SCNC: 138 MMOL/L (ref 136–145)
TRIGL SERPL-MCNC: 56 MG/DL (ref 0–149)
TSH SERPL-ACNC: 1.14 MIU/L (ref 0.44–3.98)
VLDL: 11 MG/DL (ref 0–40)
WBC # BLD AUTO: 4.6 X10*3/UL (ref 4.4–11.3)

## 2024-05-16 PROCEDURE — 83036 HEMOGLOBIN GLYCOSYLATED A1C: CPT

## 2024-05-16 PROCEDURE — 36415 COLL VENOUS BLD VENIPUNCTURE: CPT

## 2024-05-16 PROCEDURE — 85025 COMPLETE CBC W/AUTO DIFF WBC: CPT

## 2024-05-16 PROCEDURE — 80061 LIPID PANEL: CPT

## 2024-05-16 PROCEDURE — 84443 ASSAY THYROID STIM HORMONE: CPT

## 2024-05-16 PROCEDURE — 80053 COMPREHEN METABOLIC PANEL: CPT

## 2024-05-20 NOTE — PROGRESS NOTES
"Subjective   Patient ID: Cb Driver is a 89 y.o. male who presents for Diabetes, Hypertension, Hyperlipidemia, Thyroid Problem, and Depression.    Diabetes Mellitus: The patient presents today for a follow up of DM. Patient denies any side effects to the medications. Patient is not taking any anti-diabetic medication currently.    Hypertension: The patient is here for follow-up of elevated blood pressure. He is adherent to a low salt diet. Blood pressure is well controlled at home. No new myalgias or GI upset on diet control. He is currently taking furosemide 40 mg daily, lisinopril 5 mg daily.     Hyperlipidemia: The patient is present today for a follow up of hyperlipidemia. He denies having any leg cramping in particular. He is trying to follow a low-fat diet. He is currently taking atorvastatin 40 mg, 1 tablet nightly.    Hypothyroidism: Patient presents for a follow up of their thyroid function. Energy wise that patient is well.  He is currently taking levothyroxine 50 mcg daily.     Depression: Patient is presenting today for a follow up of depression. He voices that they are doing well. He is taking citalopram 20 mg as prescribed.     CKD Stage 4: recent reports indicate a slight decline in eGFR. He says that he drinks 4 to 5 bottles of water everyday.     He says that he does consume protein but should eat more.       Objective   /60   Pulse 66   Temp 36.6 °C (97.8 °F)   Resp 16   Ht 1.727 m (5' 8\")   Wt 82.4 kg (181 lb 9.6 oz)   SpO2 98%   BMI 27.61 kg/m²     Physical Exam  Vitals reviewed.   Constitutional:       Appearance: Normal appearance.   Neck:      Vascular: No carotid bruit.   Cardiovascular:      Rate and Rhythm: Normal rate and regular rhythm.      Pulses: Normal pulses.      Heart sounds: Normal heart sounds.   Pulmonary:      Effort: Pulmonary effort is normal. No respiratory distress.      Breath sounds: Normal breath sounds. No wheezing.   Abdominal:      General: There is " no distension.      Palpations: Abdomen is soft. There is no mass.      Tenderness: There is no abdominal tenderness. There is no right CVA tenderness, left CVA tenderness, guarding or rebound.   Musculoskeletal:      Cervical back: Normal range of motion and neck supple. No rigidity.      Right lower leg: No edema.      Left lower leg: No edema.   Lymphadenopathy:      Cervical: No cervical adenopathy.   Neurological:      Mental Status: He is alert.         Labs reviewed from : 05/16/2024              CMP (glucose 169, chloride 110, urea nitrogen 68, creatinine 2.23, eGFR 27, total protein 5.5), CBC, Lipid, HgA1C 6.1 %      Assessment/Plan   Problem List Items Addressed This Visit       Primary hypertension      Is well controlled, continue with current medications.          Hypothyroidism      Is stable, continue with current treatment.          Relevant Orders    Tsh With Reflex To Free T4 If Abnormal    Major depressive disorder with single episode, in partial remission (CMS-HCC)      Is stable, continue with current treatment.          Mixed hyperlipidemia      Is well controlled, continue with current medications.          Relevant Orders    Lipid Panel    Comprehensive Metabolic Panel    Type 2 diabetes mellitus with stage 4 chronic kidney disease, without long-term current use of insulin (Multi)      Is well controlled, continue with current medications.          Relevant Orders    Hemoglobin A1C    CKD (chronic kidney disease) stage 4, GFR 15-29 ml/min (Multi) - Primary      Is stable, continue with current treatment.          Relevant Orders    CBC and Auto Differential    Follow Up In [facility] - PCP - Established           Follow up in: 3 months or sooner if needed with labs prior.    Scribe Attestation  By signing my name below, IKatie , Mile   attest that this documentation has been prepared under the direction and in the presence of Juan Jose Dempsey MD.

## 2024-05-21 ENCOUNTER — OFFICE VISIT (OUTPATIENT)
Dept: PRIMARY CARE | Facility: CLINIC | Age: 89
End: 2024-05-21
Payer: COMMERCIAL

## 2024-05-21 VITALS
WEIGHT: 181.6 LBS | DIASTOLIC BLOOD PRESSURE: 60 MMHG | RESPIRATION RATE: 16 BRPM | OXYGEN SATURATION: 98 % | SYSTOLIC BLOOD PRESSURE: 114 MMHG | HEIGHT: 68 IN | HEART RATE: 66 BPM | BODY MASS INDEX: 27.52 KG/M2 | TEMPERATURE: 97.8 F

## 2024-05-21 DIAGNOSIS — E03.9 ACQUIRED HYPOTHYROIDISM: ICD-10-CM

## 2024-05-21 DIAGNOSIS — N18.4 TYPE 2 DIABETES MELLITUS WITH STAGE 4 CHRONIC KIDNEY DISEASE, WITHOUT LONG-TERM CURRENT USE OF INSULIN (MULTI): ICD-10-CM

## 2024-05-21 DIAGNOSIS — N18.4 CKD (CHRONIC KIDNEY DISEASE) STAGE 4, GFR 15-29 ML/MIN (MULTI): Primary | ICD-10-CM

## 2024-05-21 DIAGNOSIS — E78.2 MIXED HYPERLIPIDEMIA: ICD-10-CM

## 2024-05-21 DIAGNOSIS — I10 PRIMARY HYPERTENSION: ICD-10-CM

## 2024-05-21 DIAGNOSIS — F32.4 MAJOR DEPRESSIVE DISORDER WITH SINGLE EPISODE, IN PARTIAL REMISSION (CMS-HCC): ICD-10-CM

## 2024-05-21 DIAGNOSIS — E11.22 TYPE 2 DIABETES MELLITUS WITH STAGE 4 CHRONIC KIDNEY DISEASE, WITHOUT LONG-TERM CURRENT USE OF INSULIN (MULTI): ICD-10-CM

## 2024-05-21 PROCEDURE — 3078F DIAST BP <80 MM HG: CPT | Performed by: FAMILY MEDICINE

## 2024-05-21 PROCEDURE — 99214 OFFICE O/P EST MOD 30 MIN: CPT | Performed by: FAMILY MEDICINE

## 2024-05-21 PROCEDURE — 1123F ACP DISCUSS/DSCN MKR DOCD: CPT | Performed by: FAMILY MEDICINE

## 2024-05-21 PROCEDURE — 1159F MED LIST DOCD IN RCRD: CPT | Performed by: FAMILY MEDICINE

## 2024-05-21 PROCEDURE — 1160F RVW MEDS BY RX/DR IN RCRD: CPT | Performed by: FAMILY MEDICINE

## 2024-05-21 PROCEDURE — 3074F SYST BP LT 130 MM HG: CPT | Performed by: FAMILY MEDICINE

## 2024-05-21 ASSESSMENT — ENCOUNTER SYMPTOMS
LOSS OF SENSATION IN FEET: 0
DEPRESSION: 0
OCCASIONAL FEELINGS OF UNSTEADINESS: 1

## 2024-06-07 ENCOUNTER — HOSPITAL ENCOUNTER (OUTPATIENT)
Dept: CARDIOLOGY | Facility: HOSPITAL | Age: 89
Discharge: HOME | End: 2024-06-07
Payer: COMMERCIAL

## 2024-06-07 DIAGNOSIS — R55 SYNCOPE AND COLLAPSE: ICD-10-CM

## 2024-06-07 DIAGNOSIS — Z95.818 STATUS POST PLACEMENT OF IMPLANTABLE LOOP RECORDER: ICD-10-CM

## 2024-06-07 PROCEDURE — 93298 REM INTERROG DEV EVAL SCRMS: CPT | Performed by: INTERNAL MEDICINE

## 2024-06-07 PROCEDURE — 93298 REM INTERROG DEV EVAL SCRMS: CPT

## 2024-06-11 ENCOUNTER — APPOINTMENT (OUTPATIENT)
Dept: CARDIOLOGY | Facility: CLINIC | Age: 89
End: 2024-06-11
Payer: COMMERCIAL

## 2024-06-11 VITALS
BODY MASS INDEX: 26.75 KG/M2 | WEIGHT: 176.5 LBS | HEIGHT: 68 IN | HEART RATE: 74 BPM | DIASTOLIC BLOOD PRESSURE: 62 MMHG | SYSTOLIC BLOOD PRESSURE: 102 MMHG

## 2024-06-11 DIAGNOSIS — I10 PRIMARY HYPERTENSION: ICD-10-CM

## 2024-06-11 DIAGNOSIS — I25.811 CORONARY ARTERY DISEASE INVOLVING NATIVE ARTERY OF TRANSPLANTED HEART WITHOUT ANGINA PECTORIS: ICD-10-CM

## 2024-06-11 DIAGNOSIS — I25.5 ISCHEMIC CARDIOMYOPATHY: ICD-10-CM

## 2024-06-11 DIAGNOSIS — Z87.891 FORMER SMOKER: ICD-10-CM

## 2024-06-11 DIAGNOSIS — E78.2 MIXED HYPERLIPIDEMIA: ICD-10-CM

## 2024-06-11 PROCEDURE — 3074F SYST BP LT 130 MM HG: CPT | Performed by: INTERNAL MEDICINE

## 2024-06-11 PROCEDURE — 99213 OFFICE O/P EST LOW 20 MIN: CPT | Performed by: INTERNAL MEDICINE

## 2024-06-11 PROCEDURE — 3078F DIAST BP <80 MM HG: CPT | Performed by: INTERNAL MEDICINE

## 2024-06-11 PROCEDURE — 1123F ACP DISCUSS/DSCN MKR DOCD: CPT | Performed by: INTERNAL MEDICINE

## 2024-06-11 PROCEDURE — 1159F MED LIST DOCD IN RCRD: CPT | Performed by: INTERNAL MEDICINE
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
## 2024-06-11 NOTE — PATIENT INSTRUCTIONS
Follow up office visit in 9 months    Continue same medications/treatment.  Patient educated on proper medication use.  Patient educated on risk factor modification.  Please bring any lab results from other providers / physicians to your next appointment.    Please bring all medicines, vitamins and herbal supplements with you when you come to the office.    Prescriptions will not be filled unless you are compliant with your follow up appointments or have a follow up  appointment scheduled as per instruction of your physician.  Refills should be requested at the time of  Your visit.    ILiliana LPN, am scribing for and in the presence of  Dr. Galo Gaines MD, FACC

## 2024-06-13 ENCOUNTER — HOSPITAL ENCOUNTER (OUTPATIENT)
Dept: CARDIOLOGY | Facility: HOSPITAL | Age: 89
Discharge: HOME | End: 2024-06-13
Payer: COMMERCIAL

## 2024-06-13 DIAGNOSIS — Z95.818 STATUS POST PLACEMENT OF IMPLANTABLE LOOP RECORDER: ICD-10-CM

## 2024-06-13 DIAGNOSIS — R55 SYNCOPE AND COLLAPSE: ICD-10-CM

## 2024-06-21 ENCOUNTER — HOSPITAL ENCOUNTER (OUTPATIENT)
Dept: CARDIOLOGY | Facility: HOSPITAL | Age: 89
Discharge: HOME | End: 2024-06-21
Payer: COMMERCIAL

## 2024-06-21 DIAGNOSIS — Z95.818 STATUS POST PLACEMENT OF IMPLANTABLE LOOP RECORDER: ICD-10-CM

## 2024-06-21 DIAGNOSIS — R55 SYNCOPE AND COLLAPSE: ICD-10-CM

## 2024-06-22 DIAGNOSIS — M25.561 ARTHRALGIA OF RIGHT KNEE: ICD-10-CM

## 2024-06-24 RX ORDER — DICLOFENAC SODIUM 10 MG/G
GEL TOPICAL
Qty: 20 G | Refills: 0 | Status: SHIPPED | OUTPATIENT
Start: 2024-06-24

## 2024-06-24 NOTE — TELEPHONE ENCOUNTER
Rx Refill Request     Name: Cb Driver  :  1931     Date of last appointment:  2024   Date of next appointment:  2024   Best number to reach patient:  [phone]

## 2024-06-26 ENCOUNTER — APPOINTMENT (OUTPATIENT)
Dept: CARDIOLOGY | Facility: CLINIC | Age: 89
End: 2024-06-26
Payer: COMMERCIAL

## 2024-06-26 ENCOUNTER — APPOINTMENT (OUTPATIENT)
Dept: CARDIOLOGY | Facility: HOSPITAL | Age: 89
End: 2024-06-26
Payer: COMMERCIAL

## 2024-06-28 ENCOUNTER — HOSPITAL ENCOUNTER (OUTPATIENT)
Dept: CARDIOLOGY | Facility: HOSPITAL | Age: 89
Discharge: HOME | End: 2024-06-28
Payer: COMMERCIAL

## 2024-06-28 DIAGNOSIS — K21.9 GASTROESOPHAGEAL REFLUX DISEASE WITHOUT ESOPHAGITIS: ICD-10-CM

## 2024-06-28 DIAGNOSIS — Z95.818 STATUS POST PLACEMENT OF IMPLANTABLE LOOP RECORDER: ICD-10-CM

## 2024-06-28 DIAGNOSIS — R55 SYNCOPE AND COLLAPSE: ICD-10-CM

## 2024-07-02 RX ORDER — ESOMEPRAZOLE MAGNESIUM 40 MG/1
CAPSULE, DELAYED RELEASE ORAL
Qty: 90 CAPSULE | Refills: 3 | Status: SHIPPED | OUTPATIENT
Start: 2024-07-02

## 2024-07-05 ENCOUNTER — HOSPITAL ENCOUNTER (OUTPATIENT)
Dept: CARDIOLOGY | Facility: HOSPITAL | Age: 89
Discharge: HOME | End: 2024-07-05
Payer: COMMERCIAL

## 2024-07-05 DIAGNOSIS — Z95.818 STATUS POST PLACEMENT OF IMPLANTABLE LOOP RECORDER: ICD-10-CM

## 2024-07-05 DIAGNOSIS — R55 SYNCOPE AND COLLAPSE: ICD-10-CM

## 2024-07-12 ENCOUNTER — HOSPITAL ENCOUNTER (OUTPATIENT)
Dept: CARDIOLOGY | Facility: HOSPITAL | Age: 89
Discharge: HOME | End: 2024-07-12
Payer: COMMERCIAL

## 2024-07-12 DIAGNOSIS — Z95.818 STATUS POST PLACEMENT OF IMPLANTABLE LOOP RECORDER: ICD-10-CM

## 2024-07-12 DIAGNOSIS — R55 SYNCOPE AND COLLAPSE: ICD-10-CM

## 2024-07-12 PROCEDURE — 93298 REM INTERROG DEV EVAL SCRMS: CPT | Performed by: INTERNAL MEDICINE

## 2024-07-12 PROCEDURE — 93298 REM INTERROG DEV EVAL SCRMS: CPT

## 2024-07-19 ENCOUNTER — HOSPITAL ENCOUNTER (OUTPATIENT)
Dept: CARDIOLOGY | Facility: HOSPITAL | Age: 89
Discharge: HOME | End: 2024-07-19
Payer: COMMERCIAL

## 2024-07-19 ENCOUNTER — APPOINTMENT (OUTPATIENT)
Dept: RADIOLOGY | Facility: HOSPITAL | Age: 89
End: 2024-07-19
Payer: COMMERCIAL

## 2024-07-19 ENCOUNTER — APPOINTMENT (OUTPATIENT)
Dept: CARDIOLOGY | Facility: HOSPITAL | Age: 89
End: 2024-07-19
Payer: COMMERCIAL

## 2024-07-19 ENCOUNTER — HOSPITAL ENCOUNTER (EMERGENCY)
Facility: HOSPITAL | Age: 89
Discharge: HOME | End: 2024-07-19
Attending: EMERGENCY MEDICINE
Payer: COMMERCIAL

## 2024-07-19 VITALS
RESPIRATION RATE: 16 BRPM | HEART RATE: 55 BPM | WEIGHT: 178 LBS | BODY MASS INDEX: 26.98 KG/M2 | TEMPERATURE: 97.5 F | OXYGEN SATURATION: 99 % | DIASTOLIC BLOOD PRESSURE: 62 MMHG | HEIGHT: 68 IN | SYSTOLIC BLOOD PRESSURE: 134 MMHG

## 2024-07-19 DIAGNOSIS — K92.1 BLACK STOOL: Primary | ICD-10-CM

## 2024-07-19 DIAGNOSIS — R55 SYNCOPE AND COLLAPSE: ICD-10-CM

## 2024-07-19 DIAGNOSIS — R00.1 BRADYCARDIA: ICD-10-CM

## 2024-07-19 DIAGNOSIS — Z95.818 STATUS POST PLACEMENT OF IMPLANTABLE LOOP RECORDER: ICD-10-CM

## 2024-07-19 DIAGNOSIS — N17.9 ACUTE KIDNEY INJURY (CMS-HCC): ICD-10-CM

## 2024-07-19 LAB
ABO GROUP (TYPE) IN BLOOD: NORMAL
ALBUMIN SERPL BCP-MCNC: 3.4 G/DL (ref 3.4–5)
ALP SERPL-CCNC: 115 U/L (ref 33–136)
ALT SERPL W P-5'-P-CCNC: 15 U/L (ref 10–52)
ANION GAP SERPL CALC-SCNC: 11 MMOL/L (ref 10–20)
ANION GAP SERPL CALC-SCNC: 14 MMOL/L (ref 10–20)
ANTIBODY SCREEN: NORMAL
APTT PPP: 33 SECONDS (ref 27–38)
AST SERPL W P-5'-P-CCNC: 13 U/L (ref 9–39)
ATRIAL RATE: 43 BPM
BASOPHILS # BLD AUTO: 0.04 X10*3/UL (ref 0–0.1)
BASOPHILS NFR BLD AUTO: 0.8 %
BILIRUB SERPL-MCNC: 1 MG/DL (ref 0–1.2)
BUN SERPL-MCNC: 66 MG/DL (ref 6–23)
BUN SERPL-MCNC: 68 MG/DL (ref 6–23)
CALCIUM SERPL-MCNC: 8.5 MG/DL (ref 8.6–10.3)
CALCIUM SERPL-MCNC: 8.8 MG/DL (ref 8.6–10.3)
CARDIAC TROPONIN I PNL SERPL HS: 24 NG/L (ref 0–20)
CARDIAC TROPONIN I PNL SERPL HS: 24 NG/L (ref 0–20)
CHLORIDE SERPL-SCNC: 110 MMOL/L (ref 98–107)
CHLORIDE SERPL-SCNC: 113 MMOL/L (ref 98–107)
CO2 SERPL-SCNC: 13 MMOL/L (ref 21–32)
CO2 SERPL-SCNC: 19 MMOL/L (ref 21–32)
CREAT SERPL-MCNC: 2.57 MG/DL (ref 0.5–1.3)
CREAT SERPL-MCNC: 2.72 MG/DL (ref 0.5–1.3)
EGFRCR SERPLBLD CKD-EPI 2021: 21 ML/MIN/1.73M*2
EGFRCR SERPLBLD CKD-EPI 2021: 23 ML/MIN/1.73M*2
EOSINOPHIL # BLD AUTO: 0.12 X10*3/UL (ref 0–0.4)
EOSINOPHIL NFR BLD AUTO: 2.5 %
ERYTHROCYTE [DISTWIDTH] IN BLOOD BY AUTOMATED COUNT: 14.1 % (ref 11.5–14.5)
GLUCOSE SERPL-MCNC: 86 MG/DL (ref 74–99)
GLUCOSE SERPL-MCNC: 99 MG/DL (ref 74–99)
HCT VFR BLD AUTO: 35.7 % (ref 41–52)
HGB BLD-MCNC: 11.8 G/DL (ref 13.5–17.5)
HOLD SPECIMEN: NORMAL
HOLD SPECIMEN: NORMAL
IMM GRANULOCYTES # BLD AUTO: 0.01 X10*3/UL (ref 0–0.5)
IMM GRANULOCYTES NFR BLD AUTO: 0.2 % (ref 0–0.9)
INR PPP: 1.1 (ref 0.9–1.1)
LACTATE SERPL-SCNC: 0.7 MMOL/L (ref 0.4–2)
LIPASE SERPL-CCNC: 13 U/L (ref 9–82)
LYMPHOCYTES # BLD AUTO: 2.02 X10*3/UL (ref 0.8–3)
LYMPHOCYTES NFR BLD AUTO: 42.4 %
MAGNESIUM SERPL-MCNC: 2.08 MG/DL (ref 1.6–2.4)
MCH RBC QN AUTO: 30.7 PG (ref 26–34)
MCHC RBC AUTO-ENTMCNC: 33.1 G/DL (ref 32–36)
MCV RBC AUTO: 93 FL (ref 80–100)
MONOCYTES # BLD AUTO: 0.28 X10*3/UL (ref 0.05–0.8)
MONOCYTES NFR BLD AUTO: 5.9 %
NEUTROPHILS # BLD AUTO: 2.29 X10*3/UL (ref 1.6–5.5)
NEUTROPHILS NFR BLD AUTO: 48.2 %
NRBC BLD-RTO: 0 /100 WBCS (ref 0–0)
P AXIS: 84 DEGREES
P OFFSET: 110 MS
P ONSET: 82 MS
PLATELET # BLD AUTO: 107 X10*3/UL (ref 150–450)
POTASSIUM SERPL-SCNC: 4.9 MMOL/L (ref 3.5–5.3)
POTASSIUM SERPL-SCNC: 5 MMOL/L (ref 3.5–5.3)
PR INTERVAL: 282 MS
PROT SERPL-MCNC: 5.9 G/DL (ref 6.4–8.2)
PROTHROMBIN TIME: 12.2 SECONDS (ref 9.8–12.8)
Q ONSET: 223 MS
QRS COUNT: 7 BEATS
QRS DURATION: 72 MS
QT INTERVAL: 502 MS
QTC CALCULATION(BAZETT): 424 MS
QTC FREDERICIA: 449 MS
R AXIS: 57 DEGREES
RBC # BLD AUTO: 3.84 X10*6/UL (ref 4.5–5.9)
RH FACTOR (ANTIGEN D): NORMAL
SODIUM SERPL-SCNC: 135 MMOL/L (ref 136–145)
SODIUM SERPL-SCNC: 135 MMOL/L (ref 136–145)
T AXIS: 90 DEGREES
T OFFSET: 474 MS
VENTRICULAR RATE: 43 BPM
WBC # BLD AUTO: 4.8 X10*3/UL (ref 4.4–11.3)

## 2024-07-19 PROCEDURE — 83690 ASSAY OF LIPASE: CPT

## 2024-07-19 PROCEDURE — 2500000004 HC RX 250 GENERAL PHARMACY W/ HCPCS (ALT 636 FOR OP/ED)

## 2024-07-19 PROCEDURE — 71045 X-RAY EXAM CHEST 1 VIEW: CPT | Mod: FOREIGN READ | Performed by: RADIOLOGY

## 2024-07-19 PROCEDURE — 84484 ASSAY OF TROPONIN QUANT: CPT

## 2024-07-19 PROCEDURE — 80053 COMPREHEN METABOLIC PANEL: CPT

## 2024-07-19 PROCEDURE — 86901 BLOOD TYPING SEROLOGIC RH(D): CPT

## 2024-07-19 PROCEDURE — 80051 ELECTROLYTE PANEL: CPT

## 2024-07-19 PROCEDURE — 36415 COLL VENOUS BLD VENIPUNCTURE: CPT

## 2024-07-19 PROCEDURE — 85025 COMPLETE CBC W/AUTO DIFF WBC: CPT

## 2024-07-19 PROCEDURE — 74176 CT ABD & PELVIS W/O CONTRAST: CPT | Mod: FOREIGN READ | Performed by: RADIOLOGY

## 2024-07-19 PROCEDURE — 96361 HYDRATE IV INFUSION ADD-ON: CPT

## 2024-07-19 PROCEDURE — C9113 INJ PANTOPRAZOLE SODIUM, VIA: HCPCS

## 2024-07-19 PROCEDURE — 74176 CT ABD & PELVIS W/O CONTRAST: CPT

## 2024-07-19 PROCEDURE — 83605 ASSAY OF LACTIC ACID: CPT

## 2024-07-19 PROCEDURE — 96374 THER/PROPH/DIAG INJ IV PUSH: CPT

## 2024-07-19 PROCEDURE — 83735 ASSAY OF MAGNESIUM: CPT

## 2024-07-19 PROCEDURE — 85610 PROTHROMBIN TIME: CPT

## 2024-07-19 PROCEDURE — 71045 X-RAY EXAM CHEST 1 VIEW: CPT

## 2024-07-19 PROCEDURE — 93005 ELECTROCARDIOGRAM TRACING: CPT

## 2024-07-19 PROCEDURE — 99285 EMERGENCY DEPT VISIT HI MDM: CPT

## 2024-07-19 RX ORDER — FAMOTIDINE 20 MG/1
20 TABLET, FILM COATED ORAL 2 TIMES DAILY
Qty: 20 TABLET | Refills: 0 | Status: SHIPPED | OUTPATIENT
Start: 2024-07-19 | End: 2024-07-29

## 2024-07-19 RX ORDER — PANTOPRAZOLE SODIUM 40 MG/10ML
80 INJECTION, POWDER, LYOPHILIZED, FOR SOLUTION INTRAVENOUS ONCE
Status: COMPLETED | OUTPATIENT
Start: 2024-07-19 | End: 2024-07-19

## 2024-07-19 RX ADMIN — SODIUM CHLORIDE 1000 ML: 9 INJECTION, SOLUTION INTRAVENOUS at 16:07

## 2024-07-19 RX ADMIN — PANTOPRAZOLE SODIUM 80 MG: 40 INJECTION, POWDER, FOR SOLUTION INTRAVENOUS at 16:05

## 2024-07-19 ASSESSMENT — LIFESTYLE VARIABLES
TOTAL SCORE: 0
HAVE YOU EVER FELT YOU SHOULD CUT DOWN ON YOUR DRINKING: NO
EVER FELT BAD OR GUILTY ABOUT YOUR DRINKING: NO
HAVE PEOPLE ANNOYED YOU BY CRITICIZING YOUR DRINKING: NO
EVER HAD A DRINK FIRST THING IN THE MORNING TO STEADY YOUR NERVES TO GET RID OF A HANGOVER: NO

## 2024-07-19 ASSESSMENT — COLUMBIA-SUICIDE SEVERITY RATING SCALE - C-SSRS
2. HAVE YOU ACTUALLY HAD ANY THOUGHTS OF KILLING YOURSELF?: NO
1. IN THE PAST MONTH, HAVE YOU WISHED YOU WERE DEAD OR WISHED YOU COULD GO TO SLEEP AND NOT WAKE UP?: NO
6. HAVE YOU EVER DONE ANYTHING, STARTED TO DO ANYTHING, OR PREPARED TO DO ANYTHING TO END YOUR LIFE?: NO

## 2024-07-19 ASSESSMENT — PAIN SCALES - GENERAL: PAINLEVEL_OUTOF10: 0 - NO PAIN

## 2024-07-19 ASSESSMENT — PAIN - FUNCTIONAL ASSESSMENT: PAIN_FUNCTIONAL_ASSESSMENT: 0-10

## 2024-07-19 ASSESSMENT — PAIN DESCRIPTION - PROGRESSION: CLINICAL_PROGRESSION: NOT CHANGED
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
## 2024-07-22 ENCOUNTER — TELEPHONE (OUTPATIENT)
Dept: PRIMARY CARE | Facility: CLINIC | Age: 89
End: 2024-07-22
Payer: COMMERCIAL

## 2024-07-26 ENCOUNTER — HOSPITAL ENCOUNTER (OUTPATIENT)
Dept: CARDIOLOGY | Facility: HOSPITAL | Age: 89
Discharge: HOME | End: 2024-07-26
Payer: COMMERCIAL

## 2024-07-26 DIAGNOSIS — Z95.818 STATUS POST PLACEMENT OF IMPLANTABLE LOOP RECORDER: ICD-10-CM

## 2024-07-26 DIAGNOSIS — R55 SYNCOPE AND COLLAPSE: ICD-10-CM

## 2024-07-26 DIAGNOSIS — R60.9 PERIPHERAL EDEMA: ICD-10-CM

## 2024-07-26 RX ORDER — FUROSEMIDE 40 MG/1
40 TABLET ORAL DAILY
Qty: 90 TABLET | Refills: 3 | Status: SHIPPED | OUTPATIENT
Start: 2024-07-26 | End: 2025-07-26

## 2024-07-26 NOTE — TELEPHONE ENCOUNTER
Received request for prescription refills for patient.   Patient follows with Dr. Gaines      Request is for Lasix 40mg   Is patient currently on medication yes- per OV note pt. To take 1 tab per day    Last OV 6/11/24  Next OV 3/11/25    Pended for signing and sent to provider

## 2024-07-30 ENCOUNTER — APPOINTMENT (OUTPATIENT)
Dept: PRIMARY CARE | Facility: CLINIC | Age: 89
End: 2024-07-30
Payer: COMMERCIAL

## 2024-07-30 VITALS
HEART RATE: 61 BPM | HEIGHT: 68 IN | RESPIRATION RATE: 18 BRPM | OXYGEN SATURATION: 98 % | WEIGHT: 172.6 LBS | DIASTOLIC BLOOD PRESSURE: 58 MMHG | BODY MASS INDEX: 26.16 KG/M2 | TEMPERATURE: 97.6 F | SYSTOLIC BLOOD PRESSURE: 126 MMHG

## 2024-07-30 DIAGNOSIS — K92.1 MELENA: Primary | ICD-10-CM

## 2024-07-30 DIAGNOSIS — I10 PRIMARY HYPERTENSION: ICD-10-CM

## 2024-07-30 PROCEDURE — 99213 OFFICE O/P EST LOW 20 MIN: CPT | Performed by: FAMILY MEDICINE

## 2024-07-30 PROCEDURE — 3078F DIAST BP <80 MM HG: CPT | Performed by: FAMILY MEDICINE

## 2024-07-30 PROCEDURE — 1123F ACP DISCUSS/DSCN MKR DOCD: CPT | Performed by: FAMILY MEDICINE

## 2024-07-30 PROCEDURE — 3074F SYST BP LT 130 MM HG: CPT | Performed by: FAMILY MEDICINE

## 2024-07-30 PROCEDURE — 1159F MED LIST DOCD IN RCRD: CPT | Performed by: FAMILY MEDICINE

## 2024-07-30 PROCEDURE — 1160F RVW MEDS BY RX/DR IN RCRD: CPT | Performed by: FAMILY MEDICINE

## 2024-07-30 ASSESSMENT — PATIENT HEALTH QUESTIONNAIRE - PHQ9
SUM OF ALL RESPONSES TO PHQ9 QUESTIONS 1 AND 2: 0
2. FEELING DOWN, DEPRESSED OR HOPELESS: NOT AT ALL
1. LITTLE INTEREST OR PLEASURE IN DOING THINGS: NOT AT ALL

## 2024-07-30 ASSESSMENT — ENCOUNTER SYMPTOMS
LOSS OF SENSATION IN FEET: 0
OCCASIONAL FEELINGS OF UNSTEADINESS: 1
DEPRESSION: 0

## 2024-07-30 NOTE — PROGRESS NOTES
"Subjective   Patient ID: Cb Driver is an 89 y.o. male who is presenting today for a Black or Bloody Stool and Obesity .    Melena: Patient was see in the ED at [facility] on 7- for black stools, bradycardia. Black stools were thought to be due to the patient taking Pepto bismol. ECG revealed sinus job with a heart rate of 43 bpm and first degree AV block with a more pronounced low voltage QRS; bedside echo ruled out any pericardial effusion or tamponade; patient has hiatal hernia and was started on Pepcid with advice to follow up with GI. He is taking clopidogrel and cilostazol. Patient no longer has tarry stools.     Patient has been prescribed esomeprazole and has discontinued Pepcid.     Patient will continue to follow up with Dr. Gaines regarding his bradycardia.     The patient denies having the following symptoms: chest pain, chest pressure, fever, chills, N/V/D, constipation, dizziness, headaches, SOB.    Objective   Vitals:  /58   Pulse 61   Temp 36.4 °C (97.6 °F)   Resp 18   Ht 1.727 m (5' 8\")   Wt 78.3 kg (172 lb 9.6 oz)   SpO2 98%   BMI 26.24 kg/m²       Physical Exam  Vitals reviewed.   Constitutional:       Appearance: Normal appearance.   Neck:      Vascular: No carotid bruit.   Cardiovascular:      Rate and Rhythm: Normal rate and regular rhythm.      Pulses: Normal pulses.      Heart sounds: Normal heart sounds.   Pulmonary:      Effort: Pulmonary effort is normal. No respiratory distress.      Breath sounds: Normal breath sounds. No wheezing.   Abdominal:      General: There is no distension.      Palpations: Abdomen is soft. There is no mass.      Tenderness: There is no abdominal tenderness. There is no right CVA tenderness, left CVA tenderness, guarding or rebound.   Musculoskeletal:      Cervical back: Normal range of motion and neck supple. No rigidity.      Right lower leg: No edema.      Left lower leg: No edema.   Lymphadenopathy:      Cervical: No " cervical adenopathy.   Neurological:      Mental Status: He is alert.         Labs reviewed from :              CMP, CBC, Lipid    Assessment/Plan   Problem List Items Addressed This Visit       Primary hypertension    Relevant Orders    Magnesium    Lipid Panel    Comprehensive Metabolic Panel    CBC and Auto Differential    Melena - Primary    Current Assessment & Plan     Patient had an episode of melena thought to be due to him taking Pepto-Bismol. Will refer to  gastroenterology for further evaluation.          Relevant Orders    Follow Up In [facility] - PCP - Established       Follow up in: as scheduled in 3 months or sooner if needed with labs prior.    Scribe Attestation  By signing my name below, IKatie Scribe   attest that this documentation has been prepared under the direction and in the presence of Juan Jose Dempsey MD.

## 2024-07-30 NOTE — ASSESSMENT & PLAN NOTE
Patient had an episode of melena thought to be due to him taking Pepto-Bismol. Will refer to  gastroenterology for further evaluation.

## 2024-08-05 NOTE — TELEPHONE ENCOUNTER
Mamta from [facility] called in, said she also wondering if they could get a verbal order for skilled nursing - should be 2 visits. Please advise.

## 2024-08-06 ENCOUNTER — HOSPITAL ENCOUNTER (OUTPATIENT)
Dept: CARDIOLOGY | Facility: HOSPITAL | Age: 89
Discharge: HOME | End: 2024-08-06
Payer: COMMERCIAL

## 2024-08-06 ENCOUNTER — DOCUMENTATION (OUTPATIENT)
Dept: CARDIOLOGY | Facility: CLINIC | Age: 89
End: 2024-08-06
Payer: COMMERCIAL

## 2024-08-06 DIAGNOSIS — Z95.818 STATUS POST PLACEMENT OF IMPLANTABLE LOOP RECORDER: ICD-10-CM

## 2024-08-06 DIAGNOSIS — R55 SYNCOPE AND COLLAPSE: ICD-10-CM

## 2024-08-06 NOTE — PROGRESS NOTES
Secure message received from the [facility] device clinic stating the patient had a run of SVT on his loop recorder download today.  Review of the electrograms reveals a 16 beat run of ventricular tachycardia at a rate of 182 bpm lasting 5 seconds in duration with ST depressions.  The episode occurred on August 5, 2024 at 8:24 PM.  Medical records reviewed.  Patient has no pending appointments with electrophysiology.  Note forwarded to scheduling to arrange an office visit with Dr. Fong or myself as soon as possible.

## 2024-08-06 NOTE — TELEPHONE ENCOUNTER
Sravanthi was called and stated she was called by the office yesterday and made aware. Nothing was documented.

## 2024-08-08 ENCOUNTER — OFFICE VISIT (OUTPATIENT)
Dept: CARDIOLOGY | Facility: CLINIC | Age: 89
End: 2024-08-08
Payer: COMMERCIAL

## 2024-08-08 VITALS
WEIGHT: 175 LBS | HEIGHT: 68 IN | DIASTOLIC BLOOD PRESSURE: 52 MMHG | HEART RATE: 61 BPM | SYSTOLIC BLOOD PRESSURE: 98 MMHG | BODY MASS INDEX: 26.52 KG/M2

## 2024-08-08 DIAGNOSIS — I49.5 SINUS NODE DYSFUNCTION (MULTI): ICD-10-CM

## 2024-08-08 DIAGNOSIS — Z95.818 STATUS POST PLACEMENT OF IMPLANTABLE LOOP RECORDER: Primary | ICD-10-CM

## 2024-08-08 DIAGNOSIS — I47.20 VT (VENTRICULAR TACHYCARDIA) (MULTI): ICD-10-CM

## 2024-08-08 DIAGNOSIS — Z87.891 FORMER SMOKER: ICD-10-CM

## 2024-08-08 DIAGNOSIS — I25.811 CORONARY ARTERY DISEASE INVOLVING NATIVE ARTERY OF TRANSPLANTED HEART WITHOUT ANGINA PECTORIS: ICD-10-CM

## 2024-08-08 DIAGNOSIS — R94.31 ABNORMAL ELECTROCARDIOGRAM (ECG) (EKG): ICD-10-CM

## 2024-08-08 PROCEDURE — 1123F ACP DISCUSS/DSCN MKR DOCD: CPT | Performed by: INTERNAL MEDICINE

## 2024-08-08 PROCEDURE — 3074F SYST BP LT 130 MM HG: CPT | Performed by: INTERNAL MEDICINE

## 2024-08-08 PROCEDURE — 99215 OFFICE O/P EST HI 40 MIN: CPT | Performed by: INTERNAL MEDICINE

## 2024-08-08 PROCEDURE — 1159F MED LIST DOCD IN RCRD: CPT | Performed by: INTERNAL MEDICINE

## 2024-08-08 PROCEDURE — 3078F DIAST BP <80 MM HG: CPT | Performed by: INTERNAL MEDICINE

## 2024-08-08 PROCEDURE — 93000 ELECTROCARDIOGRAM COMPLETE: CPT | Mod: DISTINCT PROCEDURAL SERVICE | Performed by: INTERNAL MEDICINE

## 2024-08-08 ASSESSMENT — ENCOUNTER SYMPTOMS
DYSPNEA ON EXERTION: 0
PALPITATIONS: 0

## 2024-08-08 NOTE — PATIENT INSTRUCTIONS
Continue same medications/treatment.  Patient educated on proper medication use.  Patient educated on risk factor modification.  Please bring any lab results from other providers/physicians to your next appointment.    Please bring all medicines, vitamins, and herbal supplements with you when you come to the office.    Prescriptions will not be filled unless you are compliant with your follow up appointments or have a follow up appointment scheduled as per instruction of your physician. Refills should be requested at the time of your visit.    SCHEDULE Lexiscan stress test  Follow up with Luann in 6 weeks    Arcelia GUTIERREZ RN, AM SCRIBING FOR, AND IN THE PRESENCE OF DR. RUBIO CASTELAN MD
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
## 2024-08-09 ENCOUNTER — TELEPHONE (OUTPATIENT)
Dept: PRIMARY CARE | Facility: CLINIC | Age: 89
End: 2024-08-09

## 2024-08-09 ENCOUNTER — HOSPITAL ENCOUNTER (OUTPATIENT)
Dept: CARDIOLOGY | Facility: HOSPITAL | Age: 89
Discharge: HOME | End: 2024-08-09
Payer: COMMERCIAL

## 2024-08-09 DIAGNOSIS — Z95.818 STATUS POST PLACEMENT OF IMPLANTABLE LOOP RECORDER: ICD-10-CM

## 2024-08-09 NOTE — TELEPHONE ENCOUNTER
[facility] CALLED IN TO LET ARYA KNOW THAT GEENA DID START PT IN HOME YESTERDAY THEY STARTED HIM WITH THE FREQUENCY OF 1X A WEEK FOR 1 WEEK AND THEN 2X A WEEK FOR 3WEEKS.      ALSO WANTED ME TO LET ARYA KNOW THAT HIS BP WAS LOW AT 80/40 . HE DID MEET WITH THE CARDIOLOGIST AND HE KNOWS HE'S RUNNING LOW BUT SAYS HE'S ASYMPTOMATIC .     GEENA DOES HAVE A SCHEDULED STRESS TEST ON 8/23.    FYI !

## 2024-08-13 ENCOUNTER — APPOINTMENT (OUTPATIENT)
Dept: PRIMARY CARE | Facility: CLINIC | Age: 89
End: 2024-08-13
Payer: COMMERCIAL

## 2024-08-16 ENCOUNTER — HOSPITAL ENCOUNTER (OUTPATIENT)
Dept: CARDIOLOGY | Facility: HOSPITAL | Age: 89
Discharge: HOME | End: 2024-08-16
Payer: COMMERCIAL

## 2024-08-16 PROCEDURE — 93298 REM INTERROG DEV EVAL SCRMS: CPT

## 2024-08-16 PROCEDURE — 93298 REM INTERROG DEV EVAL SCRMS: CPT | Performed by: INTERNAL MEDICINE

## 2024-08-21 DIAGNOSIS — I77.9 PERIPHERAL ARTERIAL OCCLUSIVE DISEASE (CMS-HCC): ICD-10-CM

## 2024-08-21 DIAGNOSIS — E03.9 HYPOTHYROIDISM, UNSPECIFIED TYPE: ICD-10-CM

## 2024-08-21 DIAGNOSIS — R60.9 PERIPHERAL EDEMA: ICD-10-CM

## 2024-08-21 DIAGNOSIS — N40.0 PROSTATE ENLARGEMENT: ICD-10-CM

## 2024-08-22 RX ORDER — POTASSIUM CHLORIDE 600 MG/1
8 CAPSULE, EXTENDED RELEASE ORAL DAILY
Qty: 90 CAPSULE | Refills: 1 | Status: SHIPPED | OUTPATIENT
Start: 2024-08-22

## 2024-08-22 RX ORDER — CILOSTAZOL 50 MG/1
TABLET ORAL
Qty: 180 TABLET | Refills: 1 | Status: SHIPPED | OUTPATIENT
Start: 2024-08-22

## 2024-08-22 RX ORDER — TAMSULOSIN HYDROCHLORIDE 0.4 MG/1
0.4 CAPSULE ORAL DAILY
Qty: 90 CAPSULE | Refills: 1 | Status: SHIPPED | OUTPATIENT
Start: 2024-08-22

## 2024-08-22 RX ORDER — LEVOTHYROXINE SODIUM 50 UG/1
TABLET ORAL
Qty: 90 TABLET | Refills: 0 | Status: SHIPPED | OUTPATIENT
Start: 2024-08-22

## 2024-08-23 ENCOUNTER — APPOINTMENT (OUTPATIENT)
Dept: RADIOLOGY | Facility: CLINIC | Age: 89
End: 2024-08-23
Payer: COMMERCIAL

## 2024-08-23 ENCOUNTER — APPOINTMENT (OUTPATIENT)
Dept: CARDIOLOGY | Facility: CLINIC | Age: 89
End: 2024-08-23
Payer: COMMERCIAL

## 2024-08-23 ENCOUNTER — HOSPITAL ENCOUNTER (OUTPATIENT)
Dept: CARDIOLOGY | Facility: HOSPITAL | Age: 89
Discharge: HOME | End: 2024-08-23
Payer: COMMERCIAL

## 2024-08-23 DIAGNOSIS — Z95.818 STATUS POST PLACEMENT OF IMPLANTABLE LOOP RECORDER: ICD-10-CM

## 2024-08-30 ENCOUNTER — HOSPITAL ENCOUNTER (OUTPATIENT)
Dept: CARDIOLOGY | Facility: HOSPITAL | Age: 89
Discharge: HOME | End: 2024-08-30
Payer: COMMERCIAL

## 2024-08-30 DIAGNOSIS — Z95.818 STATUS POST PLACEMENT OF IMPLANTABLE LOOP RECORDER: ICD-10-CM

## 2024-09-17 ENCOUNTER — HOSPITAL ENCOUNTER (OUTPATIENT)
Dept: CARDIOLOGY | Facility: CLINIC | Age: 89
Discharge: HOME | End: 2024-09-17
Payer: COMMERCIAL

## 2024-09-17 ENCOUNTER — HOSPITAL ENCOUNTER (OUTPATIENT)
Dept: RADIOLOGY | Facility: CLINIC | Age: 89
Discharge: HOME | End: 2024-09-17
Payer: COMMERCIAL

## 2024-09-17 DIAGNOSIS — I47.20 VT (VENTRICULAR TACHYCARDIA) (MULTI): ICD-10-CM

## 2024-09-17 DIAGNOSIS — R94.31 ABNORMAL ELECTROCARDIOGRAM (ECG) (EKG): ICD-10-CM

## 2024-09-17 PROCEDURE — A9502 TC99M TETROFOSMIN: HCPCS | Performed by: INTERNAL MEDICINE

## 2024-09-17 PROCEDURE — 78452 HT MUSCLE IMAGE SPECT MULT: CPT

## 2024-09-17 PROCEDURE — 2500000004 HC RX 250 GENERAL PHARMACY W/ HCPCS (ALT 636 FOR OP/ED): Performed by: INTERNAL MEDICINE

## 2024-09-17 PROCEDURE — 93017 CV STRESS TEST TRACING ONLY: CPT

## 2024-09-17 PROCEDURE — 3430000001 HC RX 343 DIAGNOSTIC RADIOPHARMACEUTICALS: Performed by: INTERNAL MEDICINE

## 2024-09-17 RX ORDER — AMINOPHYLLINE 25 MG/ML
50 INJECTION, SOLUTION INTRAVENOUS ONCE
Status: COMPLETED | OUTPATIENT
Start: 2024-09-17 | End: 2024-09-17

## 2024-09-17 RX ORDER — REGADENOSON 0.08 MG/ML
0.4 INJECTION, SOLUTION INTRAVENOUS ONCE
Status: COMPLETED | OUTPATIENT
Start: 2024-09-17 | End: 2024-09-17

## 2024-09-19 ENCOUNTER — APPOINTMENT (OUTPATIENT)
Dept: CARDIOLOGY | Facility: CLINIC | Age: 89
End: 2024-09-19
Payer: COMMERCIAL

## 2024-09-20 ENCOUNTER — HOSPITAL ENCOUNTER (OUTPATIENT)
Dept: CARDIOLOGY | Facility: HOSPITAL | Age: 89
Discharge: HOME | End: 2024-09-20
Payer: COMMERCIAL

## 2024-09-20 DIAGNOSIS — Z95.818 STATUS POST PLACEMENT OF IMPLANTABLE LOOP RECORDER: ICD-10-CM

## 2024-09-20 PROCEDURE — 93298 REM INTERROG DEV EVAL SCRMS: CPT

## 2024-09-24 ENCOUNTER — APPOINTMENT (OUTPATIENT)
Dept: CARDIOLOGY | Facility: CLINIC | Age: 89
End: 2024-09-24
Payer: COMMERCIAL

## 2024-09-24 VITALS
BODY MASS INDEX: 25.53 KG/M2 | WEIGHT: 167.9 LBS | SYSTOLIC BLOOD PRESSURE: 126 MMHG | HEART RATE: 60 BPM | DIASTOLIC BLOOD PRESSURE: 66 MMHG

## 2024-09-24 DIAGNOSIS — I25.811 CORONARY ARTERY DISEASE INVOLVING NATIVE ARTERY OF TRANSPLANTED HEART WITHOUT ANGINA PECTORIS: ICD-10-CM

## 2024-09-24 DIAGNOSIS — E78.2 MIXED HYPERLIPIDEMIA: ICD-10-CM

## 2024-09-24 DIAGNOSIS — Z87.891 FORMER SMOKER: ICD-10-CM

## 2024-09-24 DIAGNOSIS — I10 PRIMARY HYPERTENSION: ICD-10-CM

## 2024-09-24 DIAGNOSIS — I25.5 ISCHEMIC CARDIOMYOPATHY: ICD-10-CM

## 2024-09-24 PROCEDURE — 1159F MED LIST DOCD IN RCRD: CPT | Performed by: INTERNAL MEDICINE

## 2024-09-24 PROCEDURE — 3074F SYST BP LT 130 MM HG: CPT | Performed by: INTERNAL MEDICINE

## 2024-09-24 PROCEDURE — 1123F ACP DISCUSS/DSCN MKR DOCD: CPT | Performed by: INTERNAL MEDICINE

## 2024-09-24 PROCEDURE — 99214 OFFICE O/P EST MOD 30 MIN: CPT | Performed by: INTERNAL MEDICINE

## 2024-09-24 PROCEDURE — 3078F DIAST BP <80 MM HG: CPT | Performed by: INTERNAL MEDICINE

## 2024-09-24 NOTE — PATIENT INSTRUCTIONS
Follow up office visit in 6 months.  Continue same medications/treatment.  Patient educated on proper medication use.  Patient educated on risk factor modification.  Please bring any lab results from other providers / physicians to your next appointment.    Please bring all medicines, vitamins and herbal supplements with you when you come to the office.    Prescriptions will not be filled unless you are compliant with your follow up appointments or have a follow up  appointment scheduled as per instruction of your physician.  Refills should be requested at the time of  Your visit.    Liliana GUTIERREZ LPN, am scribing for and in the presence of  Dr. Galo Gaines MD, FACC
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
## 2024-10-22 ENCOUNTER — LAB (OUTPATIENT)
Dept: LAB | Facility: LAB | Age: 89
End: 2024-10-22
Payer: COMMERCIAL

## 2024-10-22 DIAGNOSIS — I10 PRIMARY HYPERTENSION: ICD-10-CM

## 2024-10-22 DIAGNOSIS — N18.4 TYPE 2 DIABETES MELLITUS WITH STAGE 4 CHRONIC KIDNEY DISEASE, WITHOUT LONG-TERM CURRENT USE OF INSULIN (MULTI): ICD-10-CM

## 2024-10-22 DIAGNOSIS — E11.22 TYPE 2 DIABETES MELLITUS WITH STAGE 4 CHRONIC KIDNEY DISEASE, WITHOUT LONG-TERM CURRENT USE OF INSULIN (MULTI): ICD-10-CM

## 2024-10-22 DIAGNOSIS — E03.9 ACQUIRED HYPOTHYROIDISM: ICD-10-CM

## 2024-10-22 LAB
ALBUMIN SERPL BCP-MCNC: 3.3 G/DL (ref 3.4–5)
ALP SERPL-CCNC: 110 U/L (ref 33–136)
ALT SERPL W P-5'-P-CCNC: 9 U/L (ref 10–52)
ANION GAP SERPL CALC-SCNC: 10 MMOL/L (ref 10–20)
AST SERPL W P-5'-P-CCNC: 12 U/L (ref 9–39)
BASOPHILS # BLD AUTO: 0.02 X10*3/UL (ref 0–0.1)
BASOPHILS NFR BLD AUTO: 0.4 %
BILIRUB SERPL-MCNC: 1 MG/DL (ref 0–1.2)
BUN SERPL-MCNC: 35 MG/DL (ref 6–23)
CALCIUM SERPL-MCNC: 8.2 MG/DL (ref 8.6–10.3)
CHLORIDE SERPL-SCNC: 112 MMOL/L (ref 98–107)
CHOLEST SERPL-MCNC: 91 MG/DL (ref 0–199)
CHOLESTEROL/HDL RATIO: 2.5
CO2 SERPL-SCNC: 21 MMOL/L (ref 21–32)
CREAT SERPL-MCNC: 1.95 MG/DL (ref 0.5–1.3)
EGFRCR SERPLBLD CKD-EPI 2021: 32 ML/MIN/1.73M*2
EOSINOPHIL # BLD AUTO: 0.19 X10*3/UL (ref 0–0.4)
EOSINOPHIL NFR BLD AUTO: 4.2 %
ERYTHROCYTE [DISTWIDTH] IN BLOOD BY AUTOMATED COUNT: 13.8 % (ref 11.5–14.5)
EST. AVERAGE GLUCOSE BLD GHB EST-MCNC: 117 MG/DL
GLUCOSE SERPL-MCNC: 95 MG/DL (ref 74–99)
HBA1C MFR BLD: 5.7 %
HCT VFR BLD AUTO: 33.5 % (ref 41–52)
HDLC SERPL-MCNC: 36 MG/DL
HGB BLD-MCNC: 10.8 G/DL (ref 13.5–17.5)
IMM GRANULOCYTES # BLD AUTO: 0.01 X10*3/UL (ref 0–0.5)
IMM GRANULOCYTES NFR BLD AUTO: 0.2 % (ref 0–0.9)
LDLC SERPL CALC-MCNC: 42 MG/DL
LYMPHOCYTES # BLD AUTO: 1.91 X10*3/UL (ref 0.8–3)
LYMPHOCYTES NFR BLD AUTO: 42.4 %
MAGNESIUM SERPL-MCNC: 1.86 MG/DL (ref 1.6–2.4)
MCH RBC QN AUTO: 31.4 PG (ref 26–34)
MCHC RBC AUTO-ENTMCNC: 32.2 G/DL (ref 32–36)
MCV RBC AUTO: 97 FL (ref 80–100)
MONOCYTES # BLD AUTO: 0.26 X10*3/UL (ref 0.05–0.8)
MONOCYTES NFR BLD AUTO: 5.8 %
NEUTROPHILS # BLD AUTO: 2.11 X10*3/UL (ref 1.6–5.5)
NEUTROPHILS NFR BLD AUTO: 47 %
NON HDL CHOLESTEROL: 55 MG/DL (ref 0–149)
NRBC BLD-RTO: 0 /100 WBCS (ref 0–0)
PLATELET # BLD AUTO: 122 X10*3/UL (ref 150–450)
POTASSIUM SERPL-SCNC: 4.4 MMOL/L (ref 3.5–5.3)
PROT SERPL-MCNC: 5.1 G/DL (ref 6.4–8.2)
RBC # BLD AUTO: 3.44 X10*6/UL (ref 4.5–5.9)
SODIUM SERPL-SCNC: 139 MMOL/L (ref 136–145)
TRIGL SERPL-MCNC: 65 MG/DL (ref 0–149)
TSH SERPL-ACNC: 1.26 MIU/L (ref 0.44–3.98)
VLDL: 13 MG/DL (ref 0–40)
WBC # BLD AUTO: 4.5 X10*3/UL (ref 4.4–11.3)

## 2024-10-22 PROCEDURE — 83735 ASSAY OF MAGNESIUM: CPT

## 2024-10-22 PROCEDURE — 80061 LIPID PANEL: CPT

## 2024-10-22 PROCEDURE — 36415 COLL VENOUS BLD VENIPUNCTURE: CPT

## 2024-10-22 PROCEDURE — 85025 COMPLETE CBC W/AUTO DIFF WBC: CPT

## 2024-10-22 PROCEDURE — 80053 COMPREHEN METABOLIC PANEL: CPT

## 2024-10-22 PROCEDURE — 84443 ASSAY THYROID STIM HORMONE: CPT

## 2024-10-22 PROCEDURE — 83036 HEMOGLOBIN GLYCOSYLATED A1C: CPT

## 2024-10-23 ENCOUNTER — APPOINTMENT (OUTPATIENT)
Dept: GASTROENTEROLOGY | Facility: CLINIC | Age: 89
End: 2024-10-23
Payer: COMMERCIAL

## 2024-10-23 VITALS
BODY MASS INDEX: 25.31 KG/M2 | SYSTOLIC BLOOD PRESSURE: 104 MMHG | RESPIRATION RATE: 16 BRPM | HEART RATE: 33 BPM | WEIGHT: 167 LBS | OXYGEN SATURATION: 96 % | HEIGHT: 68 IN | DIASTOLIC BLOOD PRESSURE: 55 MMHG

## 2024-10-23 DIAGNOSIS — K92.1 MELENA: ICD-10-CM

## 2024-10-23 PROCEDURE — 1159F MED LIST DOCD IN RCRD: CPT | Performed by: STUDENT IN AN ORGANIZED HEALTH CARE EDUCATION/TRAINING PROGRAM

## 2024-10-23 PROCEDURE — 3074F SYST BP LT 130 MM HG: CPT | Performed by: STUDENT IN AN ORGANIZED HEALTH CARE EDUCATION/TRAINING PROGRAM

## 2024-10-23 PROCEDURE — 99203 OFFICE O/P NEW LOW 30 MIN: CPT | Performed by: STUDENT IN AN ORGANIZED HEALTH CARE EDUCATION/TRAINING PROGRAM

## 2024-10-23 PROCEDURE — 1123F ACP DISCUSS/DSCN MKR DOCD: CPT | Performed by: STUDENT IN AN ORGANIZED HEALTH CARE EDUCATION/TRAINING PROGRAM

## 2024-10-23 PROCEDURE — 3078F DIAST BP <80 MM HG: CPT | Performed by: STUDENT IN AN ORGANIZED HEALTH CARE EDUCATION/TRAINING PROGRAM
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
## 2024-10-25 ENCOUNTER — HOSPITAL ENCOUNTER (OUTPATIENT)
Dept: CARDIOLOGY | Facility: HOSPITAL | Age: 89
Discharge: HOME | End: 2024-10-25
Payer: COMMERCIAL

## 2024-10-25 DIAGNOSIS — Z95.818 STATUS POST PLACEMENT OF IMPLANTABLE LOOP RECORDER: ICD-10-CM

## 2024-10-25 PROCEDURE — 93298 REM INTERROG DEV EVAL SCRMS: CPT

## 2024-10-25 PROCEDURE — 93298 REM INTERROG DEV EVAL SCRMS: CPT | Performed by: INTERNAL MEDICINE

## 2024-10-29 ENCOUNTER — APPOINTMENT (OUTPATIENT)
Dept: PRIMARY CARE | Facility: CLINIC | Age: 89
End: 2024-10-29
Payer: COMMERCIAL

## 2024-10-29 VITALS
WEIGHT: 171 LBS | TEMPERATURE: 97.3 F | BODY MASS INDEX: 25.91 KG/M2 | OXYGEN SATURATION: 98 % | DIASTOLIC BLOOD PRESSURE: 50 MMHG | HEART RATE: 62 BPM | RESPIRATION RATE: 16 BRPM | SYSTOLIC BLOOD PRESSURE: 94 MMHG | HEIGHT: 68 IN

## 2024-10-29 DIAGNOSIS — E03.9 ACQUIRED HYPOTHYROIDISM: ICD-10-CM

## 2024-10-29 DIAGNOSIS — H61.21 IMPACTED CERUMEN OF RIGHT EAR: ICD-10-CM

## 2024-10-29 DIAGNOSIS — I77.9 PERIPHERAL ARTERIAL OCCLUSIVE DISEASE (CMS-HCC): ICD-10-CM

## 2024-10-29 DIAGNOSIS — E78.2 MIXED HYPERLIPIDEMIA: ICD-10-CM

## 2024-10-29 DIAGNOSIS — K92.1 MELENA: ICD-10-CM

## 2024-10-29 DIAGNOSIS — Z23 NEED FOR IMMUNIZATION AGAINST INFLUENZA: ICD-10-CM

## 2024-10-29 DIAGNOSIS — I10 PRIMARY HYPERTENSION: Primary | ICD-10-CM

## 2024-10-29 PROCEDURE — 1159F MED LIST DOCD IN RCRD: CPT | Performed by: FAMILY MEDICINE

## 2024-10-29 PROCEDURE — 1123F ACP DISCUSS/DSCN MKR DOCD: CPT | Performed by: FAMILY MEDICINE

## 2024-10-29 PROCEDURE — 3078F DIAST BP <80 MM HG: CPT | Performed by: FAMILY MEDICINE

## 2024-10-29 PROCEDURE — 90662 IIV NO PRSV INCREASED AG IM: CPT | Performed by: FAMILY MEDICINE

## 2024-10-29 PROCEDURE — 99214 OFFICE O/P EST MOD 30 MIN: CPT | Performed by: FAMILY MEDICINE

## 2024-10-29 PROCEDURE — 3074F SYST BP LT 130 MM HG: CPT | Performed by: FAMILY MEDICINE

## 2024-10-29 PROCEDURE — 69209 REMOVE IMPACTED EAR WAX UNI: CPT | Performed by: FAMILY MEDICINE

## 2024-10-29 PROCEDURE — G0008 ADMIN INFLUENZA VIRUS VAC: HCPCS | Performed by: FAMILY MEDICINE

## 2024-10-29 PROCEDURE — 1160F RVW MEDS BY RX/DR IN RCRD: CPT | Performed by: FAMILY MEDICINE

## 2024-10-29 ASSESSMENT — ENCOUNTER SYMPTOMS
OCCASIONAL FEELINGS OF UNSTEADINESS: 0
DEPRESSION: 0
LOSS OF SENSATION IN FEET: 0

## 2024-10-29 ASSESSMENT — PATIENT HEALTH QUESTIONNAIRE - PHQ9
2. FEELING DOWN, DEPRESSED OR HOPELESS: NOT AT ALL
1. LITTLE INTEREST OR PLEASURE IN DOING THINGS: NOT AT ALL
SUM OF ALL RESPONSES TO PHQ9 QUESTIONS 1 AND 2: 0

## 2024-11-01 ENCOUNTER — HOSPITAL ENCOUNTER (OUTPATIENT)
Dept: CARDIOLOGY | Facility: HOSPITAL | Age: 89
Discharge: HOME | End: 2024-11-01
Payer: COMMERCIAL

## 2024-11-01 DIAGNOSIS — Z95.818 STATUS POST PLACEMENT OF IMPLANTABLE LOOP RECORDER: ICD-10-CM

## 2024-11-08 ENCOUNTER — HOSPITAL ENCOUNTER (OUTPATIENT)
Dept: CARDIOLOGY | Facility: HOSPITAL | Age: 89
Discharge: HOME | End: 2024-11-08
Payer: COMMERCIAL

## 2024-11-08 DIAGNOSIS — Z95.818 STATUS POST PLACEMENT OF IMPLANTABLE LOOP RECORDER: ICD-10-CM

## 2024-11-15 ENCOUNTER — HOSPITAL ENCOUNTER (OUTPATIENT)
Dept: CARDIOLOGY | Facility: HOSPITAL | Age: 89
Discharge: HOME | End: 2024-11-15
Payer: COMMERCIAL

## 2024-11-15 DIAGNOSIS — Z95.818 STATUS POST PLACEMENT OF IMPLANTABLE LOOP RECORDER: ICD-10-CM

## 2024-11-19 DIAGNOSIS — E78.2 MIXED HYPERLIPIDEMIA: ICD-10-CM

## 2024-11-19 RX ORDER — ATORVASTATIN CALCIUM 40 MG/1
40 TABLET, FILM COATED ORAL NIGHTLY
Qty: 90 TABLET | Refills: 3 | Status: SHIPPED | OUTPATIENT
Start: 2024-11-19

## 2024-11-19 NOTE — TELEPHONE ENCOUNTER
Received request for prescription refills for patient.   Patient follows with Dr. Gaines    Request is for Lipitor  Is patient currently on medication yes    Last OV 9/24/2024  Next OV 3/11/2025    Pended for signing and sent to provider

## 2024-11-21 ENCOUNTER — APPOINTMENT (OUTPATIENT)
Dept: PRIMARY CARE | Facility: CLINIC | Age: 89
End: 2024-11-21
Payer: COMMERCIAL

## 2024-11-21 VITALS
RESPIRATION RATE: 16 BRPM | BODY MASS INDEX: 26.34 KG/M2 | HEIGHT: 68 IN | WEIGHT: 173.8 LBS | SYSTOLIC BLOOD PRESSURE: 92 MMHG | DIASTOLIC BLOOD PRESSURE: 44 MMHG | TEMPERATURE: 97.6 F | OXYGEN SATURATION: 97 % | HEART RATE: 68 BPM

## 2024-11-21 DIAGNOSIS — N18.4 TYPE 2 DIABETES MELLITUS WITH STAGE 4 CHRONIC KIDNEY DISEASE, WITHOUT LONG-TERM CURRENT USE OF INSULIN (MULTI): Primary | ICD-10-CM

## 2024-11-21 DIAGNOSIS — I10 PRIMARY HYPERTENSION: ICD-10-CM

## 2024-11-21 DIAGNOSIS — E03.9 ACQUIRED HYPOTHYROIDISM: ICD-10-CM

## 2024-11-21 DIAGNOSIS — F32.4 MAJOR DEPRESSIVE DISORDER WITH SINGLE EPISODE, IN PARTIAL REMISSION (CMS-HCC): ICD-10-CM

## 2024-11-21 DIAGNOSIS — E78.2 MIXED HYPERLIPIDEMIA: ICD-10-CM

## 2024-11-21 DIAGNOSIS — E11.22 TYPE 2 DIABETES MELLITUS WITH STAGE 4 CHRONIC KIDNEY DISEASE, WITHOUT LONG-TERM CURRENT USE OF INSULIN (MULTI): Primary | ICD-10-CM

## 2024-11-21 PROCEDURE — 3078F DIAST BP <80 MM HG: CPT | Performed by: FAMILY MEDICINE

## 2024-11-21 PROCEDURE — 3074F SYST BP LT 130 MM HG: CPT | Performed by: FAMILY MEDICINE

## 2024-11-21 PROCEDURE — G2211 COMPLEX E/M VISIT ADD ON: HCPCS | Performed by: FAMILY MEDICINE

## 2024-11-21 PROCEDURE — 1159F MED LIST DOCD IN RCRD: CPT | Performed by: FAMILY MEDICINE

## 2024-11-21 PROCEDURE — 1123F ACP DISCUSS/DSCN MKR DOCD: CPT | Performed by: FAMILY MEDICINE

## 2024-11-21 PROCEDURE — 1160F RVW MEDS BY RX/DR IN RCRD: CPT | Performed by: FAMILY MEDICINE

## 2024-11-21 PROCEDURE — 99213 OFFICE O/P EST LOW 20 MIN: CPT | Performed by: FAMILY MEDICINE

## 2024-11-21 ASSESSMENT — ENCOUNTER SYMPTOMS
DEPRESSION: 0
LOSS OF SENSATION IN FEET: 0
OCCASIONAL FEELINGS OF UNSTEADINESS: 1

## 2024-11-21 NOTE — PROGRESS NOTES
"Subjective   Patient ID:  Cb Driver is a 89 y.o. male patient who presents today for Hypertension (Lisinopril was discontinued at last appt due to low BP reading.).    Hypertension: Blood pressure is low.  Patient denies nocturia. He is currently on Pletal and Plavix. Continue Plavix. Medication list updated.     CKD: eGFR is 32. Patient drinks lots of water throughout the day.     Depression: Patient is doing well and staying busy.     Objective   Vitals:  BP (!) 92/44   Pulse 68   Temp 36.4 °C (97.6 °F)   Resp 16   Ht 1.727 m (5' 8\")   Wt 78.8 kg (173 lb 12.8 oz)   SpO2 97%   BMI 26.43 kg/m²       Physical Exam  Vitals reviewed.   Constitutional:       Appearance: Normal appearance.   Cardiovascular:      Rate and Rhythm: Normal rate and regular rhythm.      Pulses: Normal pulses.      Heart sounds: Normal heart sounds.   Pulmonary:      Effort: Pulmonary effort is normal.      Breath sounds: Normal breath sounds.   Abdominal:      General: Abdomen is flat.      Palpations: Abdomen is soft.   Musculoskeletal:      Cervical back: Normal range of motion and neck supple.   Neurological:      Mental Status: He is alert.         Labs reviewed from:   10/22/24      CMP, CBC, Lipid, HgA1C 5.7 %       Assessment/Plan   Problem List Items Addressed This Visit          Cardiac and Vasculature    Primary hypertension    Relevant Orders    CBC and Auto Differential    Comprehensive Metabolic Panel    Mixed hyperlipidemia    Relevant Orders    Lipid Panel       Endocrine/Metabolic    Hypothyroidism    Relevant Orders    TSH with reflex to Free T4 if abnormal    Type 2 diabetes mellitus with stage 4 chronic kidney disease, without long-term current use of insulin (Multi) - Primary    Relevant Orders    Hemoglobin A1C    Albumin-Creatinine Ratio, Urine Random    Follow Up In [facility] - PCP - Medicare Annual       Mental Health    Major depressive disorder with single episode, in partial remission " (CMS-HCC)   The above diagnoses are stable or well-controlled and we will continue with the current treatments unless noted above.  Will    Follow up in: 3 month(s) for Wellness and follow-up or sooner if needed with labs prior or on arrival.       Scribe Attestation  By signing my name below, BRENDA Julia Chance Reddy , [employer]rib[employer] attest that this documentation has been prepared under the direction and in the presence of Juan Jose Dempsey MD.

## 2024-11-22 ENCOUNTER — HOSPITAL ENCOUNTER (OUTPATIENT)
Dept: CARDIOLOGY | Facility: HOSPITAL | Age: 89
Discharge: HOME | End: 2024-11-22
Payer: COMMERCIAL

## 2024-11-22 DIAGNOSIS — Z95.818 STATUS POST PLACEMENT OF IMPLANTABLE LOOP RECORDER: ICD-10-CM

## 2024-11-23 DIAGNOSIS — M25.561 ARTHRALGIA OF RIGHT KNEE: Primary | ICD-10-CM

## 2024-11-25 RX ORDER — DICLOFENAC SODIUM 10 MG/G
GEL TOPICAL
Qty: 100 G | Refills: 2 | Status: SHIPPED | OUTPATIENT
Start: 2024-11-25 | End: 2025-02-23

## 2024-11-25 NOTE — TELEPHONE ENCOUNTER
Rx Refill Request     Name: Cb Driver  :  1931   Medication Name:     diclofenac sodium (Voltaren) 1 % gel    Last fill: 2024 25 day supply 100 g R 0  Specific Pharmacy location:  [facility]  Date of last appointment:  2024   Date of next appointment:  2025   Best number to reach patient:  [phone]       RX PENDED to [facility] as directed by Electronic Correspondence.

## 2024-11-26 ENCOUNTER — APPOINTMENT (OUTPATIENT)
Dept: RADIOLOGY | Facility: HOSPITAL | Age: 89
End: 2024-11-26
Payer: COMMERCIAL

## 2024-11-26 ENCOUNTER — HOSPITAL ENCOUNTER (EMERGENCY)
Facility: HOSPITAL | Age: 89
Discharge: HOME | End: 2024-11-26
Attending: STUDENT IN AN ORGANIZED HEALTH CARE EDUCATION/TRAINING PROGRAM
Payer: COMMERCIAL

## 2024-11-26 VITALS
HEIGHT: 68 IN | BODY MASS INDEX: 27.13 KG/M2 | SYSTOLIC BLOOD PRESSURE: 134 MMHG | TEMPERATURE: 97.2 F | WEIGHT: 179 LBS | DIASTOLIC BLOOD PRESSURE: 67 MMHG | HEART RATE: 65 BPM | RESPIRATION RATE: 20 BRPM | OXYGEN SATURATION: 100 %

## 2024-11-26 DIAGNOSIS — L03.115 CELLULITIS OF RIGHT LOWER EXTREMITY: Primary | ICD-10-CM

## 2024-11-26 LAB
ALBUMIN SERPL BCP-MCNC: 3.3 G/DL (ref 3.4–5)
ALP SERPL-CCNC: 131 U/L (ref 33–136)
ALT SERPL W P-5'-P-CCNC: 9 U/L (ref 10–52)
ANION GAP SERPL CALC-SCNC: 9 MMOL/L (ref 10–20)
AST SERPL W P-5'-P-CCNC: 13 U/L (ref 9–39)
BASOPHILS # BLD AUTO: 0.03 X10*3/UL (ref 0–0.1)
BASOPHILS NFR BLD AUTO: 0.6 %
BILIRUB SERPL-MCNC: 0.7 MG/DL (ref 0–1.2)
BNP SERPL-MCNC: 314 PG/ML (ref 0–99)
BUN SERPL-MCNC: 49 MG/DL (ref 6–23)
CALCIUM SERPL-MCNC: 8.3 MG/DL (ref 8.6–10.3)
CHLORIDE SERPL-SCNC: 111 MMOL/L (ref 98–107)
CO2 SERPL-SCNC: 23 MMOL/L (ref 21–32)
CREAT SERPL-MCNC: 1.98 MG/DL (ref 0.5–1.3)
EGFRCR SERPLBLD CKD-EPI 2021: 31 ML/MIN/1.73M*2
EOSINOPHIL # BLD AUTO: 0.22 X10*3/UL (ref 0–0.4)
EOSINOPHIL NFR BLD AUTO: 4.5 %
ERYTHROCYTE [DISTWIDTH] IN BLOOD BY AUTOMATED COUNT: 13.3 % (ref 11.5–14.5)
GLUCOSE SERPL-MCNC: 84 MG/DL (ref 74–99)
HCT VFR BLD AUTO: 31.8 % (ref 41–52)
HGB BLD-MCNC: 10.3 G/DL (ref 13.5–17.5)
IMM GRANULOCYTES # BLD AUTO: 0.01 X10*3/UL (ref 0–0.5)
IMM GRANULOCYTES NFR BLD AUTO: 0.2 % (ref 0–0.9)
LYMPHOCYTES # BLD AUTO: 1.58 X10*3/UL (ref 0.8–3)
LYMPHOCYTES NFR BLD AUTO: 32.6 %
MCH RBC QN AUTO: 31.2 PG (ref 26–34)
MCHC RBC AUTO-ENTMCNC: 32.4 G/DL (ref 32–36)
MCV RBC AUTO: 96 FL (ref 80–100)
MONOCYTES # BLD AUTO: 0.37 X10*3/UL (ref 0.05–0.8)
MONOCYTES NFR BLD AUTO: 7.6 %
NEUTROPHILS # BLD AUTO: 2.63 X10*3/UL (ref 1.6–5.5)
NEUTROPHILS NFR BLD AUTO: 54.5 %
NRBC BLD-RTO: 0 /100 WBCS (ref 0–0)
PLATELET # BLD AUTO: 134 X10*3/UL (ref 150–450)
POTASSIUM SERPL-SCNC: 4.4 MMOL/L (ref 3.5–5.3)
PROT SERPL-MCNC: 5.6 G/DL (ref 6.4–8.2)
RBC # BLD AUTO: 3.3 X10*6/UL (ref 4.5–5.9)
SODIUM SERPL-SCNC: 139 MMOL/L (ref 136–145)
WBC # BLD AUTO: 4.8 X10*3/UL (ref 4.4–11.3)

## 2024-11-26 PROCEDURE — 80053 COMPREHEN METABOLIC PANEL: CPT | Performed by: STUDENT IN AN ORGANIZED HEALTH CARE EDUCATION/TRAINING PROGRAM

## 2024-11-26 PROCEDURE — 83880 ASSAY OF NATRIURETIC PEPTIDE: CPT | Performed by: STUDENT IN AN ORGANIZED HEALTH CARE EDUCATION/TRAINING PROGRAM

## 2024-11-26 PROCEDURE — 99284 EMERGENCY DEPT VISIT MOD MDM: CPT | Mod: 25

## 2024-11-26 PROCEDURE — 93971 EXTREMITY STUDY: CPT

## 2024-11-26 PROCEDURE — 2500000001 HC RX 250 WO HCPCS SELF ADMINISTERED DRUGS (ALT 637 FOR MEDICARE OP): Performed by: STUDENT IN AN ORGANIZED HEALTH CARE EDUCATION/TRAINING PROGRAM

## 2024-11-26 PROCEDURE — 73700 CT LOWER EXTREMITY W/O DYE: CPT | Mod: RT

## 2024-11-26 PROCEDURE — 85025 COMPLETE CBC W/AUTO DIFF WBC: CPT | Performed by: STUDENT IN AN ORGANIZED HEALTH CARE EDUCATION/TRAINING PROGRAM

## 2024-11-26 PROCEDURE — 73700 CT LOWER EXTREMITY W/O DYE: CPT | Mod: RIGHT SIDE | Performed by: RADIOLOGY

## 2024-11-26 PROCEDURE — 36415 COLL VENOUS BLD VENIPUNCTURE: CPT | Performed by: STUDENT IN AN ORGANIZED HEALTH CARE EDUCATION/TRAINING PROGRAM

## 2024-11-26 RX ORDER — DOXYCYCLINE 100 MG/1
100 TABLET ORAL 2 TIMES DAILY
Qty: 14 TABLET | Refills: 0 | Status: SHIPPED | OUTPATIENT
Start: 2024-11-26 | End: 2024-12-03

## 2024-11-26 RX ORDER — DOXYCYCLINE HYCLATE 100 MG
100 TABLET ORAL ONCE
Status: COMPLETED | OUTPATIENT
Start: 2024-11-26 | End: 2024-11-26

## 2024-11-26 ASSESSMENT — PAIN SCALES - GENERAL
PAINLEVEL_OUTOF10: 0 - NO PAIN
PAINLEVEL_OUTOF10: 0 - NO PAIN

## 2024-11-26 ASSESSMENT — COLUMBIA-SUICIDE SEVERITY RATING SCALE - C-SSRS
1. IN THE PAST MONTH, HAVE YOU WISHED YOU WERE DEAD OR WISHED YOU COULD GO TO SLEEP AND NOT WAKE UP?: NO
6. HAVE YOU EVER DONE ANYTHING, STARTED TO DO ANYTHING, OR PREPARED TO DO ANYTHING TO END YOUR LIFE?: NO
2. HAVE YOU ACTUALLY HAD ANY THOUGHTS OF KILLING YOURSELF?: NO

## 2024-11-26 ASSESSMENT — LIFESTYLE VARIABLES
HAVE PEOPLE ANNOYED YOU BY CRITICIZING YOUR DRINKING: NO
EVER HAD A DRINK FIRST THING IN THE MORNING TO STEADY YOUR NERVES TO GET RID OF A HANGOVER: NO
EVER FELT BAD OR GUILTY ABOUT YOUR DRINKING: NO
TOTAL SCORE: 0
HAVE YOU EVER FELT YOU SHOULD CUT DOWN ON YOUR DRINKING: NO

## 2024-11-26 ASSESSMENT — PAIN - FUNCTIONAL ASSESSMENT: PAIN_FUNCTIONAL_ASSESSMENT: 0-10
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
## 2024-11-27 ENCOUNTER — HOSPITAL ENCOUNTER (OUTPATIENT)
Dept: CARDIOLOGY | Facility: HOSPITAL | Age: 89
Discharge: HOME | End: 2024-11-27
Payer: COMMERCIAL

## 2024-11-27 DIAGNOSIS — Z95.818 STATUS POST PLACEMENT OF IMPLANTABLE LOOP RECORDER: ICD-10-CM

## 2024-11-27 PROCEDURE — 93298 REM INTERROG DEV EVAL SCRMS: CPT

## 2024-11-27 PROCEDURE — 93298 REM INTERROG DEV EVAL SCRMS: CPT | Performed by: INTERNAL MEDICINE

## 2024-12-03 ENCOUNTER — OFFICE VISIT (OUTPATIENT)
Dept: PRIMARY CARE | Facility: CLINIC | Age: 89
End: 2024-12-03
Payer: COMMERCIAL

## 2024-12-03 VITALS
RESPIRATION RATE: 16 BRPM | WEIGHT: 176.4 LBS | TEMPERATURE: 96.5 F | HEIGHT: 68 IN | HEART RATE: 79 BPM | DIASTOLIC BLOOD PRESSURE: 62 MMHG | BODY MASS INDEX: 26.73 KG/M2 | SYSTOLIC BLOOD PRESSURE: 104 MMHG | OXYGEN SATURATION: 99 %

## 2024-12-03 DIAGNOSIS — T78.40XA ALLERGIC REACTION, INITIAL ENCOUNTER: Primary | ICD-10-CM

## 2024-12-03 PROCEDURE — 1123F ACP DISCUSS/DSCN MKR DOCD: CPT | Performed by: FAMILY MEDICINE

## 2024-12-03 PROCEDURE — G2211 COMPLEX E/M VISIT ADD ON: HCPCS | Performed by: FAMILY MEDICINE

## 2024-12-03 PROCEDURE — 99213 OFFICE O/P EST LOW 20 MIN: CPT | Performed by: FAMILY MEDICINE

## 2024-12-03 PROCEDURE — 3078F DIAST BP <80 MM HG: CPT | Performed by: FAMILY MEDICINE

## 2024-12-03 PROCEDURE — 1160F RVW MEDS BY RX/DR IN RCRD: CPT | Performed by: FAMILY MEDICINE

## 2024-12-03 PROCEDURE — 1159F MED LIST DOCD IN RCRD: CPT | Performed by: FAMILY MEDICINE

## 2024-12-03 PROCEDURE — 3074F SYST BP LT 130 MM HG: CPT | Performed by: FAMILY MEDICINE

## 2024-12-03 RX ORDER — METHYLPREDNISOLONE 4 MG/1
TABLET ORAL
Qty: 21 TABLET | Refills: 0 | Status: SHIPPED | OUTPATIENT
Start: 2024-12-03 | End: 2024-12-09

## 2024-12-03 ASSESSMENT — PATIENT HEALTH QUESTIONNAIRE - PHQ9
1. LITTLE INTEREST OR PLEASURE IN DOING THINGS: NOT AT ALL
2. FEELING DOWN, DEPRESSED OR HOPELESS: NOT AT ALL
SUM OF ALL RESPONSES TO PHQ9 QUESTIONS 1 AND 2: 0

## 2024-12-03 ASSESSMENT — ENCOUNTER SYMPTOMS
DEPRESSION: 0
LOSS OF SENSATION IN FEET: 0
OCCASIONAL FEELINGS OF UNSTEADINESS: 0

## 2024-12-03 NOTE — PROGRESS NOTES
"Subjective   Patient ID:  Cb Driver is a 89 y.o. male patient who presents today for Cellulitis (Right lower extremity).    Patient reports swelling and blood seeping down his right lower extremity, accompanied by itching. He went to the hospital on 11/26/24 and was started on doxycycline with no improvement. Will prescribe a Medrol Dospak. Recommended to complete the antibiotic course. He had a CT of the right knee, which showed evidence of arthritis.       Objective   Vitals:  /62   Pulse 79   Temp 35.8 °C (96.5 °F)   Resp 16   Ht 1.727 m (5' 8\")   Wt 80 kg (176 lb 6.4 oz)   SpO2 99%   BMI 26.82 kg/m²       Physical Exam  Vitals reviewed.   Constitutional:       Appearance: Normal appearance.   Cardiovascular:      Rate and Rhythm: Normal rate and regular rhythm.      Pulses: Normal pulses.      Heart sounds: Normal heart sounds.   Pulmonary:      Effort: Pulmonary effort is normal.      Breath sounds: Normal breath sounds.   Abdominal:      General: Abdomen is flat.      Palpations: Abdomen is soft.   Musculoskeletal:      Cervical back: Normal range of motion and neck supple.   Skin:     Comments: Erythematous rash on both legs and right hand and arm.    Neurological:      Mental Status: He is alert.         Labs reviewed from:   11/26/24    CMP, CBC      Assessment/Plan   Problem List Items Addressed This Visit    None  Visit Diagnoses       Allergic reaction, initial encounter    -  Primary    Relevant Medications    methylPREDNISolone (Medrol Dospak) 4 mg tablets        Cellulitis leg, finish doxycycline started in the emergency room.    Follow up in:  February 21, 2025 or sooner if needed with labs prior.       Scribe Attestation  By signing my name below, IJulia , Mile attest that this documentation has been prepared under the direction and in the presence of Juan Jose Dempsey MD.    "

## 2024-12-06 ENCOUNTER — HOSPITAL ENCOUNTER (OUTPATIENT)
Dept: CARDIOLOGY | Facility: HOSPITAL | Age: 89
Discharge: HOME | End: 2024-12-06
Payer: COMMERCIAL

## 2024-12-06 DIAGNOSIS — Z95.818 STATUS POST PLACEMENT OF IMPLANTABLE LOOP RECORDER: ICD-10-CM

## 2024-12-09 ENCOUNTER — TELEPHONE (OUTPATIENT)
Dept: PRIMARY CARE | Facility: CLINIC | Age: 89
End: 2024-12-09

## 2024-12-09 NOTE — TELEPHONE ENCOUNTER
[facility] [phone] wanting verbal orders for skilled nursing and PT.

## 2024-12-09 NOTE — TELEPHONE ENCOUNTER
Natalie From [facility] pharm calling in to let Dr. Dempsey know that he started PT today and they will be seeing him 2 times a week for 4 weeks.       ISH

## 2024-12-20 ENCOUNTER — HOSPITAL ENCOUNTER (OUTPATIENT)
Dept: CARDIOLOGY | Facility: HOSPITAL | Age: 89
Discharge: HOME | End: 2024-12-20
Payer: COMMERCIAL

## 2024-12-20 DIAGNOSIS — Z95.818 STATUS POST PLACEMENT OF IMPLANTABLE LOOP RECORDER: ICD-10-CM

## 2024-12-21 DIAGNOSIS — R07.1 CHEST PAIN ON BREATHING: ICD-10-CM

## 2024-12-21 DIAGNOSIS — E03.9 HYPOTHYROIDISM, UNSPECIFIED TYPE: ICD-10-CM

## 2024-12-21 DIAGNOSIS — N32.81 OVERACTIVE BLADDER: ICD-10-CM

## 2024-12-21 DIAGNOSIS — R60.0 PERIPHERAL EDEMA: ICD-10-CM

## 2024-12-21 DIAGNOSIS — I10 PRIMARY HYPERTENSION: ICD-10-CM

## 2024-12-21 DIAGNOSIS — I77.9 PERIPHERAL ARTERIAL OCCLUSIVE DISEASE (CMS-HCC): ICD-10-CM

## 2024-12-21 DIAGNOSIS — F32.4 MAJOR DEPRESSIVE DISORDER WITH SINGLE EPISODE, IN PARTIAL REMISSION (CMS-HCC): ICD-10-CM

## 2024-12-21 DIAGNOSIS — I10 ESSENTIAL HYPERTENSION: ICD-10-CM

## 2024-12-21 DIAGNOSIS — N40.0 PROSTATE ENLARGEMENT: ICD-10-CM

## 2024-12-23 ENCOUNTER — TELEPHONE (OUTPATIENT)
Dept: PRIMARY CARE | Facility: CLINIC | Age: 89
End: 2024-12-23
Payer: COMMERCIAL

## 2024-12-23 DIAGNOSIS — L03.115 CELLULITIS OF RIGHT LEG: Primary | ICD-10-CM

## 2024-12-23 RX ORDER — CITALOPRAM 20 MG/1
TABLET, FILM COATED ORAL
Qty: 90 TABLET | Refills: 1 | Status: SHIPPED | OUTPATIENT
Start: 2024-12-23

## 2024-12-23 RX ORDER — SULFAMETHOXAZOLE AND TRIMETHOPRIM 800; 160 MG/1; MG/1
1 TABLET ORAL 2 TIMES DAILY
Qty: 28 TABLET | Refills: 0 | Status: SHIPPED | OUTPATIENT
Start: 2024-12-23 | End: 2025-01-06

## 2024-12-23 RX ORDER — TAMSULOSIN HYDROCHLORIDE 0.4 MG/1
0.4 CAPSULE ORAL DAILY
Qty: 90 CAPSULE | Refills: 1 | Status: SHIPPED | OUTPATIENT
Start: 2024-12-23

## 2024-12-23 RX ORDER — CILOSTAZOL 50 MG/1
TABLET ORAL
Qty: 180 TABLET | Refills: 1 | Status: SHIPPED | OUTPATIENT
Start: 2024-12-23

## 2024-12-23 RX ORDER — OXYBUTYNIN CHLORIDE 10 MG/1
TABLET, EXTENDED RELEASE ORAL
Qty: 30 TABLET | Refills: 5 | Status: SHIPPED | OUTPATIENT
Start: 2024-12-23

## 2024-12-23 RX ORDER — POTASSIUM CHLORIDE 600 MG/1
8 CAPSULE ORAL DAILY
Qty: 90 CAPSULE | Refills: 1 | Status: SHIPPED | OUTPATIENT
Start: 2024-12-23

## 2024-12-23 RX ORDER — LEVOTHYROXINE SODIUM 50 UG/1
TABLET ORAL
Qty: 90 TABLET | Refills: 0 | Status: SHIPPED | OUTPATIENT
Start: 2024-12-23

## 2024-12-23 NOTE — TELEPHONE ENCOUNTER
Aster from [facility] called in stating the patients cellulitis on his left leg has cleared up but his right leg is still red and warm to the touch. She is wondering if there is anything else Dr. Dempsey would like them to try or if he would want to prescribe antibiotics. Aster is also wondering if Dr. Dempsey would want to see the patient for this?  Please Advise

## 2024-12-27 ENCOUNTER — HOSPITAL ENCOUNTER (OUTPATIENT)
Dept: CARDIOLOGY | Facility: HOSPITAL | Age: 89
Discharge: HOME | End: 2024-12-27
Payer: COMMERCIAL

## 2024-12-27 DIAGNOSIS — Z95.818 STATUS POST PLACEMENT OF IMPLANTABLE LOOP RECORDER: ICD-10-CM

## 2024-12-30 RX ORDER — CLOPIDOGREL BISULFATE 75 MG/1
75 TABLET ORAL DAILY
Qty: 90 TABLET | Refills: 3 | Status: SHIPPED | OUTPATIENT
Start: 2024-12-30

## 2024-12-30 RX ORDER — CARVEDILOL 3.12 MG/1
3.12 TABLET ORAL 2 TIMES DAILY
Qty: 180 TABLET | Refills: 3 | Status: SHIPPED | OUTPATIENT
Start: 2024-12-30

## 2024-12-30 RX ORDER — LISINOPRIL 5 MG/1
TABLET ORAL
Qty: 1 TABLET | Refills: 0 | Status: SHIPPED | OUTPATIENT
Start: 2024-12-30

## 2024-12-30 NOTE — TELEPHONE ENCOUNTER
Reviewed record.     Lisinopril was discontinued by PCP- Dr. Dempsey during 10/29/24 OV with him due to low blood pressure.     I updated refill requests to reflect approving Plavix and Coreg and refusing Lisinopril

## 2025-01-03 ENCOUNTER — HOSPITAL ENCOUNTER (OUTPATIENT)
Dept: CARDIOLOGY | Facility: HOSPITAL | Age: OVER 89
Discharge: HOME | End: 2025-01-03
Payer: COMMERCIAL

## 2025-01-03 DIAGNOSIS — Z95.818 STATUS POST PLACEMENT OF IMPLANTABLE LOOP RECORDER: ICD-10-CM

## 2025-01-03 PROCEDURE — 93298 REM INTERROG DEV EVAL SCRMS: CPT | Performed by: INTERNAL MEDICINE

## 2025-01-03 PROCEDURE — 93298 REM INTERROG DEV EVAL SCRMS: CPT

## 2025-01-09 ENCOUNTER — APPOINTMENT (OUTPATIENT)
Dept: RADIOLOGY | Facility: HOSPITAL | Age: OVER 89
End: 2025-01-09
Payer: MEDICARE

## 2025-01-09 ENCOUNTER — APPOINTMENT (OUTPATIENT)
Dept: CARDIOLOGY | Facility: HOSPITAL | Age: OVER 89
End: 2025-01-09
Payer: MEDICARE

## 2025-01-09 ENCOUNTER — HOSPITAL ENCOUNTER (INPATIENT)
Facility: HOSPITAL | Age: OVER 89
End: 2025-01-09
Attending: EMERGENCY MEDICINE | Admitting: EMERGENCY MEDICINE
Payer: MEDICARE

## 2025-01-09 DIAGNOSIS — R79.89 ELEVATED TROPONIN: ICD-10-CM
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
## 2025-01-09 DIAGNOSIS — D64.9 ANEMIA, UNSPECIFIED TYPE: ICD-10-CM

## 2025-01-09 DIAGNOSIS — T79.6XXA TRAUMATIC RHABDOMYOLYSIS, INITIAL ENCOUNTER (CMS-HCC): ICD-10-CM

## 2025-01-09 DIAGNOSIS — R60.0 PERIPHERAL EDEMA: ICD-10-CM

## 2025-01-09 DIAGNOSIS — N17.9 AKI (ACUTE KIDNEY INJURY) (CMS-HCC): ICD-10-CM

## 2025-01-09 DIAGNOSIS — E87.5 HYPERKALEMIA: ICD-10-CM

## 2025-01-09 LAB
ABO GROUP (TYPE) IN BLOOD: NORMAL
ALBUMIN SERPL BCP-MCNC: 3.4 G/DL (ref 3.4–5)
ALBUMIN SERPL BCP-MCNC: 3.8 G/DL (ref 3.4–5)
ALP SERPL-CCNC: 159 U/L (ref 33–136)
ALT SERPL W P-5'-P-CCNC: 35 U/L (ref 10–52)
ANION GAP SERPL CALC-SCNC: 17 MMOL/L (ref 10–20)
ANION GAP SERPL CALC-SCNC: 19 MMOL/L (ref 10–20)
ANTIBODY SCREEN: NORMAL
APPEARANCE UR: CLEAR
AST SERPL W P-5'-P-CCNC: 147 U/L (ref 9–39)
ATRIAL RATE: 90 BPM
BACTERIA #/AREA URNS AUTO: ABNORMAL /HPF
BASOPHILS # BLD AUTO: 0.01 X10*3/UL (ref 0–0.1)
BASOPHILS NFR BLD AUTO: 0.1 %
BILIRUB SERPL-MCNC: 1.3 MG/DL (ref 0–1.2)
BILIRUB UR STRIP.AUTO-MCNC: NEGATIVE MG/DL
BUN SERPL-MCNC: 74 MG/DL (ref 6–23)
BUN SERPL-MCNC: 75 MG/DL (ref 6–23)
CALCIUM SERPL-MCNC: 9.1 MG/DL (ref 8.6–10.3)
CALCIUM SERPL-MCNC: 9.3 MG/DL (ref 8.6–10.3)
CARDIAC TROPONIN I PNL SERPL HS: 101 NG/L (ref 0–20)
CARDIAC TROPONIN I PNL SERPL HS: 80 NG/L (ref 0–20)
CHLORIDE SERPL-SCNC: 108 MMOL/L (ref 98–107)
CHLORIDE SERPL-SCNC: 112 MMOL/L (ref 98–107)
CK SERPL-CCNC: 6974 U/L (ref 0–325)
CO2 SERPL-SCNC: 15 MMOL/L (ref 21–32)
CO2 SERPL-SCNC: 15 MMOL/L (ref 21–32)
COLOR UR: ABNORMAL
CREAT SERPL-MCNC: 4.92 MG/DL (ref 0.5–1.3)
CREAT SERPL-MCNC: 5.05 MG/DL (ref 0.5–1.3)
EGFRCR SERPLBLD CKD-EPI 2021: 10 ML/MIN/1.73M*2
EGFRCR SERPLBLD CKD-EPI 2021: 10 ML/MIN/1.73M*2
EOSINOPHIL # BLD AUTO: 0 X10*3/UL (ref 0–0.4)
EOSINOPHIL NFR BLD AUTO: 0 %
ERYTHROCYTE [DISTWIDTH] IN BLOOD BY AUTOMATED COUNT: 14.6 % (ref 11.5–14.5)
ETHANOL SERPL-MCNC: <10 MG/DL
FLUAV RNA RESP QL NAA+PROBE: NOT DETECTED
FLUBV RNA RESP QL NAA+PROBE: NOT DETECTED
GLUCOSE BLD MANUAL STRIP-MCNC: 110 MG/DL (ref 74–99)
GLUCOSE BLD MANUAL STRIP-MCNC: 111 MG/DL (ref 74–99)
GLUCOSE BLD MANUAL STRIP-MCNC: 171 MG/DL (ref 74–99)
GLUCOSE BLD MANUAL STRIP-MCNC: 58 MG/DL (ref 74–99)
GLUCOSE SERPL-MCNC: 58 MG/DL (ref 74–99)
GLUCOSE SERPL-MCNC: 68 MG/DL (ref 74–99)
GLUCOSE UR STRIP.AUTO-MCNC: NORMAL MG/DL
HCT VFR BLD AUTO: 35 % (ref 41–52)
HGB BLD-MCNC: 11.4 G/DL (ref 13.5–17.5)
HOLD SPECIMEN: NORMAL
HYALINE CASTS #/AREA URNS AUTO: ABNORMAL /LPF
IMM GRANULOCYTES # BLD AUTO: 0.02 X10*3/UL (ref 0–0.5)
IMM GRANULOCYTES NFR BLD AUTO: 0.3 % (ref 0–0.9)
INR PPP: 1 (ref 0.9–1.1)
KETONES UR STRIP.AUTO-MCNC: NEGATIVE MG/DL
LACTATE SERPL-SCNC: 1.9 MMOL/L (ref 0.4–2)
LEUKOCYTE ESTERASE UR QL STRIP.AUTO: NEGATIVE
LYMPHOCYTES # BLD AUTO: 0.65 X10*3/UL (ref 0.8–3)
LYMPHOCYTES NFR BLD AUTO: 9.1 %
MCH RBC QN AUTO: 31.1 PG (ref 26–34)
MCHC RBC AUTO-ENTMCNC: 32.6 G/DL (ref 32–36)
MCV RBC AUTO: 96 FL (ref 80–100)
MONOCYTES # BLD AUTO: 0.22 X10*3/UL (ref 0.05–0.8)
MONOCYTES NFR BLD AUTO: 3.1 %
MUCOUS THREADS #/AREA URNS AUTO: ABNORMAL /LPF
NEUTROPHILS # BLD AUTO: 6.21 X10*3/UL (ref 1.6–5.5)
NEUTROPHILS NFR BLD AUTO: 87.4 %
NITRITE UR QL STRIP.AUTO: NEGATIVE
NRBC BLD-RTO: 0 /100 WBCS (ref 0–0)
P AXIS: 82 DEGREES
P OFFSET: 119 MS
P ONSET: 93 MS
PH UR STRIP.AUTO: 5 [PH]
PHOSPHATE SERPL-MCNC: 4.5 MG/DL (ref 2.5–4.9)
PHOSPHATE SERPL-MCNC: 4.5 MG/DL (ref 2.5–4.9)
PLATELET # BLD AUTO: 176 X10*3/UL (ref 150–450)
POTASSIUM SERPL-SCNC: 5.6 MMOL/L (ref 3.5–5.3)
POTASSIUM SERPL-SCNC: 5.7 MMOL/L (ref 3.5–5.3)
POTASSIUM SERPL-SCNC: 6.9 MMOL/L (ref 3.5–5.3)
PR INTERVAL: 260 MS
PROT SERPL-MCNC: 6.7 G/DL (ref 6.4–8.2)
PROT UR STRIP.AUTO-MCNC: ABNORMAL MG/DL
PROTHROMBIN TIME: 11.7 SECONDS (ref 9.8–12.8)
Q ONSET: 223 MS
QRS COUNT: 15 BEATS
QRS DURATION: 68 MS
QT INTERVAL: 370 MS
QTC CALCULATION(BAZETT): 452 MS
QTC FREDERICIA: 423 MS
R AXIS: 4 DEGREES
RBC # BLD AUTO: 3.66 X10*6/UL (ref 4.5–5.9)
RBC # UR STRIP.AUTO: ABNORMAL /UL
RBC #/AREA URNS AUTO: ABNORMAL /HPF
RH FACTOR (ANTIGEN D): NORMAL
RSV RNA RESP QL NAA+PROBE: NOT DETECTED
SARS-COV-2 RNA RESP QL NAA+PROBE: NOT DETECTED
SODIUM SERPL-SCNC: 135 MMOL/L (ref 136–145)
SODIUM SERPL-SCNC: 138 MMOL/L (ref 136–145)
SP GR UR STRIP.AUTO: 1.02
SQUAMOUS #/AREA URNS AUTO: ABNORMAL /HPF
T AXIS: 99 DEGREES
T OFFSET: 408 MS
TSH SERPL-ACNC: 1.14 MIU/L (ref 0.44–3.98)
UROBILINOGEN UR STRIP.AUTO-MCNC: NORMAL MG/DL
VENTRICULAR RATE: 90 BPM
WBC # BLD AUTO: 7.1 X10*3/UL (ref 4.4–11.3)
WBC #/AREA URNS AUTO: ABNORMAL /HPF

## 2025-01-09 PROCEDURE — 82077 ASSAY SPEC XCP UR&BREATH IA: CPT | Performed by: EMERGENCY MEDICINE

## 2025-01-09 PROCEDURE — 71250 CT THORAX DX C-: CPT | Performed by: RADIOLOGY

## 2025-01-09 PROCEDURE — 72125 CT NECK SPINE W/O DYE: CPT

## 2025-01-09 PROCEDURE — 96361 HYDRATE IV INFUSION ADD-ON: CPT | Mod: 59

## 2025-01-09 PROCEDURE — 72128 CT CHEST SPINE W/O DYE: CPT | Mod: RCN

## 2025-01-09 PROCEDURE — 36415 COLL VENOUS BLD VENIPUNCTURE: CPT | Performed by: EMERGENCY MEDICINE

## 2025-01-09 PROCEDURE — 94640 AIRWAY INHALATION TREATMENT: CPT

## 2025-01-09 PROCEDURE — 81001 URINALYSIS AUTO W/SCOPE: CPT | Performed by: EMERGENCY MEDICINE

## 2025-01-09 PROCEDURE — G0390 TRAUMA RESPONS W/HOSP CRITI: HCPCS

## 2025-01-09 PROCEDURE — 93005 ELECTROCARDIOGRAM TRACING: CPT

## 2025-01-09 PROCEDURE — 82947 ASSAY GLUCOSE BLOOD QUANT: CPT

## 2025-01-09 PROCEDURE — 70450 CT HEAD/BRAIN W/O DYE: CPT | Performed by: STUDENT IN AN ORGANIZED HEALTH CARE EDUCATION/TRAINING PROGRAM

## 2025-01-09 PROCEDURE — 71045 X-RAY EXAM CHEST 1 VIEW: CPT | Mod: FOREIGN READ | Performed by: RADIOLOGY

## 2025-01-09 PROCEDURE — 84132 ASSAY OF SERUM POTASSIUM: CPT | Performed by: NURSE PRACTITIONER

## 2025-01-09 PROCEDURE — 2020000001 HC ICU ROOM DAILY

## 2025-01-09 PROCEDURE — 80053 COMPREHEN METABOLIC PANEL: CPT | Performed by: EMERGENCY MEDICINE

## 2025-01-09 PROCEDURE — P9612 CATHETERIZE FOR URINE SPEC: HCPCS

## 2025-01-09 PROCEDURE — 72170 X-RAY EXAM OF PELVIS: CPT | Mod: FOREIGN READ | Performed by: RADIOLOGY

## 2025-01-09 PROCEDURE — 99291 CRITICAL CARE FIRST HOUR: CPT | Mod: 25 | Performed by: EMERGENCY MEDICINE

## 2025-01-09 PROCEDURE — 72131 CT LUMBAR SPINE W/O DYE: CPT | Performed by: RADIOLOGY

## 2025-01-09 PROCEDURE — 72128 CT CHEST SPINE W/O DYE: CPT | Performed by: RADIOLOGY

## 2025-01-09 PROCEDURE — 72131 CT LUMBAR SPINE W/O DYE: CPT | Mod: RCN

## 2025-01-09 PROCEDURE — 85610 PROTHROMBIN TIME: CPT | Performed by: EMERGENCY MEDICINE

## 2025-01-09 PROCEDURE — 87637 SARSCOV2&INF A&B&RSV AMP PRB: CPT | Performed by: EMERGENCY MEDICINE

## 2025-01-09 PROCEDURE — 85025 COMPLETE CBC W/AUTO DIFF WBC: CPT | Performed by: EMERGENCY MEDICINE

## 2025-01-09 PROCEDURE — 2500000004 HC RX 250 GENERAL PHARMACY W/ HCPCS (ALT 636 FOR OP/ED): Performed by: NURSE PRACTITIONER

## 2025-01-09 PROCEDURE — 87040 BLOOD CULTURE FOR BACTERIA: CPT | Mod: ELYLAB | Performed by: EMERGENCY MEDICINE

## 2025-01-09 PROCEDURE — 82550 ASSAY OF CK (CPK): CPT | Performed by: EMERGENCY MEDICINE

## 2025-01-09 PROCEDURE — 84443 ASSAY THYROID STIM HORMONE: CPT | Performed by: NURSE PRACTITIONER

## 2025-01-09 PROCEDURE — 84100 ASSAY OF PHOSPHORUS: CPT | Performed by: NURSE PRACTITIONER

## 2025-01-09 PROCEDURE — 2500000001 HC RX 250 WO HCPCS SELF ADMINISTERED DRUGS (ALT 637 FOR MEDICARE OP): Performed by: NURSE PRACTITIONER

## 2025-01-09 PROCEDURE — 96365 THER/PROPH/DIAG IV INF INIT: CPT | Mod: 59

## 2025-01-09 PROCEDURE — 86900 BLOOD TYPING SEROLOGIC ABO: CPT | Performed by: EMERGENCY MEDICINE

## 2025-01-09 PROCEDURE — 99291 CRITICAL CARE FIRST HOUR: CPT | Performed by: NURSE PRACTITIONER

## 2025-01-09 PROCEDURE — 71250 CT THORAX DX C-: CPT

## 2025-01-09 PROCEDURE — 70450 CT HEAD/BRAIN W/O DYE: CPT

## 2025-01-09 PROCEDURE — 86901 BLOOD TYPING SEROLOGIC RH(D): CPT | Performed by: EMERGENCY MEDICINE

## 2025-01-09 PROCEDURE — 84484 ASSAY OF TROPONIN QUANT: CPT | Performed by: EMERGENCY MEDICINE

## 2025-01-09 PROCEDURE — 71045 X-RAY EXAM CHEST 1 VIEW: CPT

## 2025-01-09 PROCEDURE — 72170 X-RAY EXAM OF PELVIS: CPT

## 2025-01-09 PROCEDURE — 2500000005 HC RX 250 GENERAL PHARMACY W/O HCPCS: Performed by: EMERGENCY MEDICINE

## 2025-01-09 PROCEDURE — 2500000002 HC RX 250 W HCPCS SELF ADMINISTERED DRUGS (ALT 637 FOR MEDICARE OP, ALT 636 FOR OP/ED): Performed by: NURSE PRACTITIONER

## 2025-01-09 PROCEDURE — 74176 CT ABD & PELVIS W/O CONTRAST: CPT | Performed by: RADIOLOGY

## 2025-01-09 PROCEDURE — 96375 TX/PRO/DX INJ NEW DRUG ADDON: CPT | Mod: 59

## 2025-01-09 PROCEDURE — 2500000002 HC RX 250 W HCPCS SELF ADMINISTERED DRUGS (ALT 637 FOR MEDICARE OP, ALT 636 FOR OP/ED): Performed by: EMERGENCY MEDICINE

## 2025-01-09 PROCEDURE — 51702 INSERT TEMP BLADDER CATH: CPT

## 2025-01-09 PROCEDURE — 2500000004 HC RX 250 GENERAL PHARMACY W/ HCPCS (ALT 636 FOR OP/ED): Performed by: EMERGENCY MEDICINE

## 2025-01-09 PROCEDURE — 80069 RENAL FUNCTION PANEL: CPT | Mod: CCI | Performed by: NURSE PRACTITIONER

## 2025-01-09 PROCEDURE — 72125 CT NECK SPINE W/O DYE: CPT | Performed by: STUDENT IN AN ORGANIZED HEALTH CARE EDUCATION/TRAINING PROGRAM

## 2025-01-09 PROCEDURE — 2500000005 HC RX 250 GENERAL PHARMACY W/O HCPCS: Performed by: NURSE PRACTITIONER

## 2025-01-09 PROCEDURE — 83605 ASSAY OF LACTIC ACID: CPT | Performed by: EMERGENCY MEDICINE

## 2025-01-09 RX ORDER — SODIUM BICARBONATE 1 MEQ/ML
50 SYRINGE (ML) INTRAVENOUS ONCE
Status: COMPLETED | OUTPATIENT
Start: 2025-01-09 | End: 2025-01-09

## 2025-01-09 RX ORDER — SODIUM CHLORIDE, SODIUM LACTATE, POTASSIUM CHLORIDE, CALCIUM CHLORIDE 600; 310; 30; 20 MG/100ML; MG/100ML; MG/100ML; MG/100ML
125 INJECTION, SOLUTION INTRAVENOUS CONTINUOUS
Status: ACTIVE | OUTPATIENT
Start: 2025-01-09 | End: 2025-01-10

## 2025-01-09 RX ORDER — CITALOPRAM 20 MG/1
20 TABLET, FILM COATED ORAL DAILY
Status: DISPENSED | OUTPATIENT
Start: 2025-01-09

## 2025-01-09 RX ORDER — HEPARIN SODIUM 5000 [USP'U]/ML
5000 INJECTION, SOLUTION INTRAVENOUS; SUBCUTANEOUS EVERY 8 HOURS
Status: DISPENSED | OUTPATIENT
Start: 2025-01-09

## 2025-01-09 RX ORDER — CLOPIDOGREL BISULFATE 75 MG/1
75 TABLET ORAL DAILY
Status: DISPENSED | OUTPATIENT
Start: 2025-01-09

## 2025-01-09 RX ORDER — CALCIUM GLUCONATE 20 MG/ML
2 INJECTION, SOLUTION INTRAVENOUS ONCE
Status: COMPLETED | OUTPATIENT
Start: 2025-01-09 | End: 2025-01-09

## 2025-01-09 RX ORDER — DEXTROSE 50 % IN WATER (D50W) INTRAVENOUS SYRINGE
12.5
Status: DISPENSED | OUTPATIENT
Start: 2025-01-09

## 2025-01-09 RX ORDER — CILOSTAZOL 50 MG/1
50 TABLET ORAL 2 TIMES DAILY
Status: DISPENSED | OUTPATIENT
Start: 2025-01-09

## 2025-01-09 RX ORDER — DEXTROSE 50 % IN WATER (D50W) INTRAVENOUS SYRINGE
25 ONCE
Status: COMPLETED | OUTPATIENT
Start: 2025-01-09 | End: 2025-01-09

## 2025-01-09 RX ORDER — PANTOPRAZOLE SODIUM 40 MG/1
40 TABLET, DELAYED RELEASE ORAL
Status: DISPENSED | OUTPATIENT
Start: 2025-01-10

## 2025-01-09 RX ORDER — TAMSULOSIN HYDROCHLORIDE 0.4 MG/1
0.4 CAPSULE ORAL DAILY
Status: DISPENSED | OUTPATIENT
Start: 2025-01-09

## 2025-01-09 RX ORDER — DEXTROSE 50 % IN WATER (D50W) INTRAVENOUS SYRINGE
25
Status: ACTIVE | OUTPATIENT
Start: 2025-01-09

## 2025-01-09 RX ORDER — LEVOTHYROXINE SODIUM 50 UG/1
50 TABLET ORAL
Status: DISPENSED | OUTPATIENT
Start: 2025-01-10

## 2025-01-09 RX ORDER — ALBUTEROL SULFATE 2.5 MG/.5ML
10 SOLUTION RESPIRATORY (INHALATION) ONCE
Status: COMPLETED | OUTPATIENT
Start: 2025-01-09 | End: 2025-01-09

## 2025-01-09 RX ORDER — INSULIN LISPRO 100 [IU]/ML
0-5 INJECTION, SOLUTION INTRAVENOUS; SUBCUTANEOUS
Status: ACTIVE | OUTPATIENT
Start: 2025-01-09

## 2025-01-09 RX ADMIN — INSULIN HUMAN 5 UNITS: 100 INJECTION, SOLUTION PARENTERAL at 13:36

## 2025-01-09 RX ADMIN — CITALOPRAM 20 MG: 20 TABLET, FILM COATED ORAL at 21:23

## 2025-01-09 RX ADMIN — SODIUM CHLORIDE, POTASSIUM CHLORIDE, SODIUM LACTATE AND CALCIUM CHLORIDE 125 ML/HR: 600; 310; 30; 20 INJECTION, SOLUTION INTRAVENOUS at 19:48

## 2025-01-09 RX ADMIN — CILOSTAZOL 50 MG: 50 TABLET ORAL at 21:23

## 2025-01-09 RX ADMIN — HEPARIN SODIUM 5000 UNITS: 5000 INJECTION INTRAVENOUS; SUBCUTANEOUS at 18:29

## 2025-01-09 RX ADMIN — SODIUM BICARBONATE 50 MEQ: 84 INJECTION INTRAVENOUS at 13:36

## 2025-01-09 RX ADMIN — SODIUM CHLORIDE 1000 ML: 9 INJECTION, SOLUTION INTRAVENOUS at 15:53

## 2025-01-09 RX ADMIN — SODIUM ZIRCONIUM CYCLOSILICATE 10 G: 10 POWDER, FOR SUSPENSION ORAL at 18:29

## 2025-01-09 RX ADMIN — CALCIUM GLUCONATE 2 G: 20 INJECTION, SOLUTION INTRAVENOUS at 13:27

## 2025-01-09 RX ADMIN — ALBUTEROL SULFATE 10 MG: 2.5 SOLUTION RESPIRATORY (INHALATION) at 13:46

## 2025-01-09 RX ADMIN — DEXTROSE MONOHYDRATE 25 G: 25 INJECTION, SOLUTION INTRAVENOUS at 13:36

## 2025-01-09 RX ADMIN — DEXTROSE MONOHYDRATE 12.5 G: 25 INJECTION, SOLUTION INTRAVENOUS at 16:36

## 2025-01-09 RX ADMIN — TAMSULOSIN HYDROCHLORIDE 0.4 MG: 0.4 CAPSULE ORAL at 18:29

## 2025-01-09 RX ADMIN — SODIUM CHLORIDE 1000 ML: 9 INJECTION, SOLUTION INTRAVENOUS at 13:50

## 2025-01-09 RX ADMIN — CLOPIDOGREL BISULFATE 75 MG: 75 TABLET, FILM COATED ORAL at 18:29

## 2025-01-09 ASSESSMENT — COGNITIVE AND FUNCTIONAL STATUS - GENERAL
PERSONAL GROOMING: A LITTLE
DAILY ACTIVITIY SCORE: 16
EATING MEALS: A LITTLE
DAILY ACTIVITIY SCORE: 16
HELP NEEDED FOR BATHING: A LOT
MOVING TO AND FROM BED TO CHAIR: A LITTLE
TURNING FROM BACK TO SIDE WHILE IN FLAT BAD: A LITTLE
MOVING FROM LYING ON BACK TO SITTING ON SIDE OF FLAT BED WITH BEDRAILS: A LITTLE
HELP NEEDED FOR BATHING: A LOT
DRESSING REGULAR UPPER BODY CLOTHING: A LITTLE
MOBILITY SCORE: 21
CLIMB 3 TO 5 STEPS WITH RAILING: A LOT
MOVING TO AND FROM BED TO CHAIR: A LITTLE
STANDING UP FROM CHAIR USING ARMS: A LITTLE
DRESSING REGULAR LOWER BODY CLOTHING: A LITTLE
TURNING FROM BACK TO SIDE WHILE IN FLAT BAD: A LITTLE
DRESSING REGULAR UPPER BODY CLOTHING: A LITTLE
PERSONAL GROOMING: A LITTLE
WALKING IN HOSPITAL ROOM: A LITTLE
CLIMB 3 TO 5 STEPS WITH RAILING: A LITTLE
EATING MEALS: A LITTLE
DAILY ACTIVITIY SCORE: 24
MOVING FROM LYING ON BACK TO SITTING ON SIDE OF FLAT BED WITH BEDRAILS: A LITTLE
TOILETING: A LOT
MOBILITY SCORE: 17
MOBILITY SCORE: 17
DRESSING REGULAR LOWER BODY CLOTHING: A LITTLE
STANDING UP FROM CHAIR USING ARMS: A LITTLE
TURNING FROM BACK TO SIDE WHILE IN FLAT BAD: A LITTLE
MOVING FROM LYING ON BACK TO SITTING ON SIDE OF FLAT BED WITH BEDRAILS: A LITTLE
PATIENT BASELINE BEDBOUND: NO
TOILETING: A LOT
WALKING IN HOSPITAL ROOM: A LITTLE
CLIMB 3 TO 5 STEPS WITH RAILING: A LOT

## 2025-01-09 ASSESSMENT — LIFESTYLE VARIABLES
HAVE YOU EVER FELT YOU SHOULD CUT DOWN ON YOUR DRINKING: NO
TOTAL SCORE: 0
EVER HAD A DRINK FIRST THING IN THE MORNING TO STEADY YOUR NERVES TO GET RID OF A HANGOVER: NO
EVER FELT BAD OR GUILTY ABOUT YOUR DRINKING: NO
HAVE PEOPLE ANNOYED YOU BY CRITICIZING YOUR DRINKING: NO

## 2025-01-09 ASSESSMENT — COLUMBIA-SUICIDE SEVERITY RATING SCALE - C-SSRS
6. HAVE YOU EVER DONE ANYTHING, STARTED TO DO ANYTHING, OR PREPARED TO DO ANYTHING TO END YOUR LIFE?: NO
2. HAVE YOU ACTUALLY HAD ANY THOUGHTS OF KILLING YOURSELF?: NO
1. IN THE PAST MONTH, HAVE YOU WISHED YOU WERE DEAD OR WISHED YOU COULD GO TO SLEEP AND NOT WAKE UP?: NO

## 2025-01-09 ASSESSMENT — PAIN - FUNCTIONAL ASSESSMENT
PAIN_FUNCTIONAL_ASSESSMENT: 0-10

## 2025-01-09 ASSESSMENT — ACTIVITIES OF DAILY LIVING (ADL)
JUDGMENT_ADEQUATE_SAFELY_COMPLETE_DAILY_ACTIVITIES: YES
ADEQUATE_TO_COMPLETE_ADL: YES
PATIENT'S MEMORY ADEQUATE TO SAFELY COMPLETE DAILY ACTIVITIES?: YES
WALKS IN HOME: INDEPENDENT
DRESSING YOURSELF: INDEPENDENT
FEEDING YOURSELF: INDEPENDENT
HEARING - LEFT EAR: DIFFICULTY WITH NOISE
HEARING - RIGHT EAR: DIFFICULTY WITH NOISE
GROOMING: INDEPENDENT
TOILETING: INDEPENDENT
BATHING: INDEPENDENT

## 2025-01-09 ASSESSMENT — PAIN SCALES - GENERAL
PAINLEVEL_OUTOF10: 0 - NO PAIN

## 2025-01-09 NOTE — ED PROVIDER NOTES
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 negative.  Pelvis x-ray is unremarkable.  Chest x-ray shows no acute radiographic chest finding.  CT of the head and cervical spine showed no acute intracranial hemorrhage or depressed calvarial fracture.  No acute fracture or traumatic malalignment of the cervical spine.    I suspect the patient fell and has been lying on the ground for some time.  He has developed acute kidney injury.  This is led to hyperkalemia.  The patient has been treated with IV fluids.  In addition, he has been treated with dextrose, insulin, bicarbonate, calcium and albuterol.  He has been closely monitored on the cardiac monitor.  A Cain catheter has also been placed in case this is postobstructive renal failure.    Patient require hospitalization for further workup and evaluation.    I discussed the results and plan for hospitalization with the patient and/or family/friend if present.  Questions were addressed.  Patient and/or family/friend expressed understanding.    The patient's condition requires ongoing treatment and evaluation necessitating  hospital admission.  I have reviewed the patient's history, physical exam, and test information with the admitting physician,   HALLIE Valladares/Dr. Menard                , who agrees to hospitalize the patient.           Diagnosis:   1. Fall, initial encounter    2. LESLIE (acute kidney injury) (CMS-HCC)    3. Hyperkalemia    4. Elevated troponin    5. Anemia, unspecified type    6. Traumatic rhabdomyolysis, initial encounter (CMS-HCC)                    Procedure  Procedures     Armand VELOZ MD  01/09/25 1412       Armand VELOZ MD  01/09/25 [id number]
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
## 2025-01-09 NOTE — ED PROCEDURE NOTE
Procedure  Critical Care    Performed by: Armand VELOZ MD  Authorized by: Armand VELOZ MD    Critical care provider statement:     Critical care time (minutes):  40    Critical care time was exclusive of:  Separately billable procedures and treating other patients    Critical care was necessary to treat or prevent imminent or life-threatening deterioration of the following conditions:  Renal failure    Critical care was time spent personally by me on the following activities:  Blood draw for specimens, discussions with primary provider, evaluation of patient's response to treatment, examination of patient, ordering and performing treatments and interventions, ordering and review of laboratory studies, ordering and review of radiographic studies, pulse oximetry, re-evaluation of patient's condition and review of old charts    Care discussed with: admitting provider                 Armand VELOZ MD  01/09/25 [id number]

## 2025-01-10 ENCOUNTER — HOSPITAL ENCOUNTER (INPATIENT)
Dept: CARDIOLOGY | Facility: HOSPITAL | Age: OVER 89
Discharge: HOME | End: 2025-01-10
Payer: MEDICARE

## 2025-01-10 VITALS
SYSTOLIC BLOOD PRESSURE: 102 MMHG | OXYGEN SATURATION: 94 % | DIASTOLIC BLOOD PRESSURE: 51 MMHG | TEMPERATURE: 98.1 F | HEART RATE: 78 BPM

## 2025-01-10 DIAGNOSIS — Z95.818 STATUS POST PLACEMENT OF IMPLANTABLE LOOP RECORDER: ICD-10-CM

## 2025-01-10 LAB
ALBUMIN SERPL BCP-MCNC: 3 G/DL (ref 3.4–5)
ALP SERPL-CCNC: 108 U/L (ref 33–136)
ALT SERPL W P-5'-P-CCNC: 30 U/L (ref 10–52)
ANION GAP SERPL CALC-SCNC: 15 MMOL/L (ref 10–20)
AST SERPL W P-5'-P-CCNC: 120 U/L (ref 9–39)
BILIRUB SERPL-MCNC: 0.8 MG/DL (ref 0–1.2)
BUN SERPL-MCNC: 72 MG/DL (ref 6–23)
CALCIUM SERPL-MCNC: 8.1 MG/DL (ref 8.6–10.3)
CHLORIDE SERPL-SCNC: 110 MMOL/L (ref 98–107)
CK SERPL-CCNC: 4368 U/L (ref 0–325)
CO2 SERPL-SCNC: 17 MMOL/L (ref 21–32)
CREAT SERPL-MCNC: 4.84 MG/DL (ref 0.5–1.3)
EGFRCR SERPLBLD CKD-EPI 2021: 11 ML/MIN/1.73M*2
ERYTHROCYTE [DISTWIDTH] IN BLOOD BY AUTOMATED COUNT: 14.7 % (ref 11.5–14.5)
GLUCOSE BLD MANUAL STRIP-MCNC: 128 MG/DL (ref 74–99)
GLUCOSE BLD MANUAL STRIP-MCNC: 134 MG/DL (ref 74–99)
GLUCOSE BLD MANUAL STRIP-MCNC: 135 MG/DL (ref 74–99)
GLUCOSE BLD MANUAL STRIP-MCNC: 88 MG/DL (ref 74–99)
GLUCOSE SERPL-MCNC: 95 MG/DL (ref 74–99)
HCT VFR BLD AUTO: 26 % (ref 41–52)
HGB BLD-MCNC: 8.8 G/DL (ref 13.5–17.5)
HOLD SPECIMEN: NORMAL
MAGNESIUM SERPL-MCNC: 2.13 MG/DL (ref 1.6–2.4)
MCH RBC QN AUTO: 31.5 PG (ref 26–34)
MCHC RBC AUTO-ENTMCNC: 33.8 G/DL (ref 32–36)
MCV RBC AUTO: 93 FL (ref 80–100)
NRBC BLD-RTO: 0 /100 WBCS (ref 0–0)
PHOSPHATE SERPL-MCNC: 4.7 MG/DL (ref 2.5–4.9)
PLATELET # BLD AUTO: 149 X10*3/UL (ref 150–450)
POTASSIUM SERPL-SCNC: 5.2 MMOL/L (ref 3.5–5.3)
PROT SERPL-MCNC: 4.9 G/DL (ref 6.4–8.2)
RBC # BLD AUTO: 2.79 X10*6/UL (ref 4.5–5.9)
SODIUM SERPL-SCNC: 137 MMOL/L (ref 136–145)
WBC # BLD AUTO: 5.5 X10*3/UL (ref 4.4–11.3)

## 2025-01-10 PROCEDURE — 2500000004 HC RX 250 GENERAL PHARMACY W/ HCPCS (ALT 636 FOR OP/ED): Performed by: NURSE PRACTITIONER

## 2025-01-10 PROCEDURE — 84100 ASSAY OF PHOSPHORUS: CPT | Performed by: NURSE PRACTITIONER

## 2025-01-10 PROCEDURE — 36415 COLL VENOUS BLD VENIPUNCTURE: CPT | Performed by: NURSE PRACTITIONER

## 2025-01-10 PROCEDURE — 92610 EVALUATE SWALLOWING FUNCTION: CPT | Mod: GN

## 2025-01-10 PROCEDURE — 97161 PT EVAL LOW COMPLEX 20 MIN: CPT | Mod: GP

## 2025-01-10 PROCEDURE — 82947 ASSAY GLUCOSE BLOOD QUANT: CPT

## 2025-01-10 PROCEDURE — 99233 SBSQ HOSP IP/OBS HIGH 50: CPT | Performed by: NURSE PRACTITIONER

## 2025-01-10 PROCEDURE — 1210000001 HC SEMI-PRIVATE ROOM DAILY

## 2025-01-10 PROCEDURE — 83735 ASSAY OF MAGNESIUM: CPT | Performed by: NURSE PRACTITIONER

## 2025-01-10 PROCEDURE — 2500000002 HC RX 250 W HCPCS SELF ADMINISTERED DRUGS (ALT 637 FOR MEDICARE OP, ALT 636 FOR OP/ED): Performed by: NURSE PRACTITIONER

## 2025-01-10 PROCEDURE — 2500000001 HC RX 250 WO HCPCS SELF ADMINISTERED DRUGS (ALT 637 FOR MEDICARE OP): Performed by: NURSE PRACTITIONER

## 2025-01-10 PROCEDURE — 85027 COMPLETE CBC AUTOMATED: CPT | Performed by: NURSE PRACTITIONER

## 2025-01-10 PROCEDURE — 82550 ASSAY OF CK (CPK): CPT | Performed by: NURSE PRACTITIONER

## 2025-01-10 PROCEDURE — 80053 COMPREHEN METABOLIC PANEL: CPT | Performed by: NURSE PRACTITIONER

## 2025-01-10 RX ORDER — SODIUM CHLORIDE, SODIUM LACTATE, POTASSIUM CHLORIDE, CALCIUM CHLORIDE 600; 310; 30; 20 MG/100ML; MG/100ML; MG/100ML; MG/100ML
125 INJECTION, SOLUTION INTRAVENOUS CONTINUOUS
Status: ACTIVE | OUTPATIENT
Start: 2025-01-10 | End: 2025-01-11

## 2025-01-10 RX ADMIN — SODIUM CHLORIDE, POTASSIUM CHLORIDE, SODIUM LACTATE AND CALCIUM CHLORIDE 125 ML/HR: 600; 310; 30; 20 INJECTION, SOLUTION INTRAVENOUS at 03:25

## 2025-01-10 RX ADMIN — SODIUM ZIRCONIUM CYCLOSILICATE 10 G: 10 POWDER, FOR SUSPENSION ORAL at 01:36

## 2025-01-10 RX ADMIN — CILOSTAZOL 50 MG: 50 TABLET ORAL at 08:38

## 2025-01-10 RX ADMIN — HEPARIN SODIUM 5000 UNITS: 5000 INJECTION INTRAVENOUS; SUBCUTANEOUS at 08:38

## 2025-01-10 RX ADMIN — LEVOTHYROXINE SODIUM 50 MCG: 0.05 TABLET ORAL at 06:05

## 2025-01-10 RX ADMIN — SODIUM CHLORIDE, POTASSIUM CHLORIDE, SODIUM LACTATE AND CALCIUM CHLORIDE 125 ML/HR: 600; 310; 30; 20 INJECTION, SOLUTION INTRAVENOUS at 12:33

## 2025-01-10 RX ADMIN — TAMSULOSIN HYDROCHLORIDE 0.4 MG: 0.4 CAPSULE ORAL at 08:38

## 2025-01-10 RX ADMIN — SODIUM CHLORIDE, POTASSIUM CHLORIDE, SODIUM LACTATE AND CALCIUM CHLORIDE 125 ML/HR: 600; 310; 30; 20 INJECTION, SOLUTION INTRAVENOUS at 08:38

## 2025-01-10 RX ADMIN — PANTOPRAZOLE SODIUM 40 MG: 40 TABLET, DELAYED RELEASE ORAL at 06:05

## 2025-01-10 RX ADMIN — CILOSTAZOL 50 MG: 50 TABLET ORAL at 20:34

## 2025-01-10 RX ADMIN — HEPARIN SODIUM 5000 UNITS: 5000 INJECTION INTRAVENOUS; SUBCUTANEOUS at 00:10

## 2025-01-10 RX ADMIN — SODIUM CHLORIDE 500 ML: 900 INJECTION, SOLUTION INTRAVENOUS at 03:25

## 2025-01-10 RX ADMIN — HEPARIN SODIUM 5000 UNITS: 5000 INJECTION INTRAVENOUS; SUBCUTANEOUS at 16:29

## 2025-01-10 RX ADMIN — SODIUM ZIRCONIUM CYCLOSILICATE 10 G: 10 POWDER, FOR SUSPENSION ORAL at 10:42

## 2025-01-10 RX ADMIN — CLOPIDOGREL BISULFATE 75 MG: 75 TABLET, FILM COATED ORAL at 08:38

## 2025-01-10 RX ADMIN — SODIUM CHLORIDE, POTASSIUM CHLORIDE, SODIUM LACTATE AND CALCIUM CHLORIDE 125 ML/HR: 600; 310; 30; 20 INJECTION, SOLUTION INTRAVENOUS at 20:05

## 2025-01-10 SDOH — SOCIAL STABILITY: SOCIAL INSECURITY: WITHIN THE LAST YEAR, HAVE YOU BEEN HUMILIATED OR EMOTIONALLY ABUSED IN OTHER WAYS BY YOUR PARTNER OR EX-PARTNER?: NO

## 2025-01-10 SDOH — ECONOMIC STABILITY: INCOME INSECURITY: IN THE PAST 12 MONTHS HAS THE ELECTRIC, GAS, OIL, OR WATER COMPANY THREATENED TO SHUT OFF SERVICES IN YOUR HOME?: NO

## 2025-01-10 SDOH — ECONOMIC STABILITY: FOOD INSECURITY
WITHIN THE PAST 12 MONTHS, YOU WORRIED THAT YOUR FOOD WOULD RUN OUT BEFORE YOU GOT THE MONEY TO BUY MORE.: SOMETIMES TRUE

## 2025-01-10 SDOH — ECONOMIC STABILITY: FOOD INSECURITY: WITHIN THE PAST 12 MONTHS, THE FOOD YOU BOUGHT JUST DIDN'T LAST AND YOU DIDN'T HAVE MONEY TO GET MORE.: SOMETIMES TRUE

## 2025-01-10 SDOH — SOCIAL STABILITY: SOCIAL INSECURITY
WITHIN THE LAST YEAR, HAVE YOU BEEN KICKED, HIT, SLAPPED, OR OTHERWISE PHYSICALLY HURT BY YOUR PARTNER OR EX-PARTNER?: NO

## 2025-01-10 SDOH — SOCIAL STABILITY: SOCIAL INSECURITY: ABUSE: ADULT

## 2025-01-10 SDOH — SOCIAL STABILITY: SOCIAL INSECURITY: HAS ANYONE EVER THREATENED TO HURT YOUR FAMILY OR YOUR PETS?: NO

## 2025-01-10 SDOH — SOCIAL STABILITY: SOCIAL INSECURITY: WITHIN THE LAST YEAR, HAVE YOU BEEN AFRAID OF YOUR PARTNER OR EX-PARTNER?: NO

## 2025-01-10 SDOH — ECONOMIC STABILITY: HOUSING INSECURITY: IN THE PAST 12 MONTHS, HOW MANY TIMES HAVE YOU MOVED WHERE YOU WERE LIVING?: 1

## 2025-01-10 SDOH — SOCIAL STABILITY: SOCIAL INSECURITY: DOES ANYONE TRY TO KEEP YOU FROM HAVING/CONTACTING OTHER FRIENDS OR DOING THINGS OUTSIDE YOUR HOME?: NO

## 2025-01-10 SDOH — SOCIAL STABILITY: SOCIAL INSECURITY
WITHIN THE LAST YEAR, HAVE YOU BEEN RAPED OR FORCED TO HAVE ANY KIND OF SEXUAL ACTIVITY BY YOUR PARTNER OR EX-PARTNER?: NO

## 2025-01-10 SDOH — SOCIAL STABILITY: SOCIAL INSECURITY: DO YOU FEEL UNSAFE GOING BACK TO THE PLACE WHERE YOU ARE LIVING?: NO

## 2025-01-10 SDOH — ECONOMIC STABILITY: HOUSING INSECURITY: AT ANY TIME IN THE PAST 12 MONTHS, WERE YOU HOMELESS OR LIVING IN A SHELTER (INCLUDING NOW)?: NO

## 2025-01-10 SDOH — ECONOMIC STABILITY: HOUSING INSECURITY: IN THE LAST 12 MONTHS, WAS THERE A TIME WHEN YOU WERE NOT ABLE TO PAY THE MORTGAGE OR RENT ON TIME?: NO

## 2025-01-10 SDOH — SOCIAL STABILITY: SOCIAL INSECURITY: ARE YOU OR HAVE YOU BEEN THREATENED OR ABUSED PHYSICALLY, EMOTIONALLY, OR SEXUALLY BY ANYONE?: NO

## 2025-01-10 SDOH — ECONOMIC STABILITY: TRANSPORTATION INSECURITY: IN THE PAST 12 MONTHS, HAS LACK OF TRANSPORTATION KEPT YOU FROM MEDICAL APPOINTMENTS OR FROM GETTING MEDICATIONS?: NO

## 2025-01-10 SDOH — SOCIAL STABILITY: SOCIAL INSECURITY: WERE YOU ABLE TO COMPLETE ALL THE BEHAVIORAL HEALTH SCREENINGS?: YES

## 2025-01-10 SDOH — ECONOMIC STABILITY: FOOD INSECURITY: HOW HARD IS IT FOR YOU TO PAY FOR THE VERY BASICS LIKE FOOD, HOUSING, MEDICAL CARE, AND HEATING?: NOT HARD AT ALL

## 2025-01-10 SDOH — SOCIAL STABILITY: SOCIAL INSECURITY: HAVE YOU HAD THOUGHTS OF HARMING ANYONE ELSE?: NO

## 2025-01-10 SDOH — SOCIAL STABILITY: SOCIAL INSECURITY: ARE THERE ANY APPARENT SIGNS OF INJURIES/BEHAVIORS THAT COULD BE RELATED TO ABUSE/NEGLECT?: NO

## 2025-01-10 SDOH — SOCIAL STABILITY: SOCIAL INSECURITY: DO YOU FEEL ANYONE HAS EXPLOITED OR TAKEN ADVANTAGE OF YOU FINANCIALLY OR OF YOUR PERSONAL PROPERTY?: NO

## 2025-01-10 SDOH — SOCIAL STABILITY: SOCIAL INSECURITY: HAVE YOU HAD ANY THOUGHTS OF HARMING ANYONE ELSE?: NO

## 2025-01-10 ASSESSMENT — PATIENT HEALTH QUESTIONNAIRE - PHQ9
2. FEELING DOWN, DEPRESSED OR HOPELESS: NOT AT ALL
1. LITTLE INTEREST OR PLEASURE IN DOING THINGS: NOT AT ALL
SUM OF ALL RESPONSES TO PHQ9 QUESTIONS 1 & 2: 0

## 2025-01-10 ASSESSMENT — COGNITIVE AND FUNCTIONAL STATUS - GENERAL
TOILETING: A LITTLE
CLIMB 3 TO 5 STEPS WITH RAILING: TOTAL
HELP NEEDED FOR BATHING: A LITTLE
DRESSING REGULAR LOWER BODY CLOTHING: A LITTLE
CLIMB 3 TO 5 STEPS WITH RAILING: A LITTLE
PERSONAL GROOMING: A LITTLE
STANDING UP FROM CHAIR USING ARMS: A LOT
MOVING TO AND FROM BED TO CHAIR: A LOT
TURNING FROM BACK TO SIDE WHILE IN FLAT BAD: A LITTLE
MOVING FROM LYING ON BACK TO SITTING ON SIDE OF FLAT BED WITH BEDRAILS: A LITTLE
MOVING FROM LYING ON BACK TO SITTING ON SIDE OF FLAT BED WITH BEDRAILS: A LOT
MOBILITY SCORE: 10
DRESSING REGULAR UPPER BODY CLOTHING: A LITTLE
TURNING FROM BACK TO SIDE WHILE IN FLAT BAD: A LOT
WALKING IN HOSPITAL ROOM: TOTAL
DAILY ACTIVITIY SCORE: 19
MOBILITY SCORE: 21

## 2025-01-10 ASSESSMENT — PAIN - FUNCTIONAL ASSESSMENT
PAIN_FUNCTIONAL_ASSESSMENT: 0-10

## 2025-01-10 ASSESSMENT — PAIN SCALES - GENERAL
PAINLEVEL_OUTOF10: 0 - NO PAIN

## 2025-01-10 ASSESSMENT — LIFESTYLE VARIABLES
AUDIT-C TOTAL SCORE: 0
HOW OFTEN DO YOU HAVE A DRINK CONTAINING ALCOHOL: NEVER
SKIP TO QUESTIONS 9-10: 1
HOW OFTEN DO YOU HAVE 6 OR MORE DRINKS ON ONE OCCASION: NEVER
AUDIT-C TOTAL SCORE: 0
HOW MANY STANDARD DRINKS CONTAINING ALCOHOL DO YOU HAVE ON A TYPICAL DAY: PATIENT DOES NOT DRINK

## 2025-01-10 ASSESSMENT — ACTIVITIES OF DAILY LIVING (ADL): LACK_OF_TRANSPORTATION: NO

## 2025-01-10 NOTE — CARE PLAN
The patient's goals for the shift include returning electrolyte levels to normal range values.    The clinical goals for the shift include Pt will maintain hemodynamic stability through end of shift.    Over the shift, the patient did made progress towards the goals with fluid resuscitation.
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
## 2025-01-10 NOTE — PROGRESS NOTES
Inpatient Speech-Language Pathology Clinical Swallow Evaluation       Patient Name: Cb Driver  MRN: [id number]  : 1931  Today's Date: 01/10/25  Time Calculation  Start Time:   Stop Time:   Time Calculation (min): 20 min     Room:[address] [place] [address]     Assessment:     Consistencies Trialed: Consistencies Trialed: Thin (IDDSI Level 0) - Straw, Thin (IDDSI Level 0) - Cup, Pureed/extremely thick (IDDSI Level 4), Regular (IDDSI Level 7)   Oral Motor: Within Functional Limits  Lingual Strength: Within Functional Limits   Dentition: Some Missing Teeth         Assessment Results:   DYSPHAGIA EVALUATION: (Consistencies attempted: Puree, gram cracker trial, thin liquids via cup and straws)    Oral Status: Patient's oral status was within functional limits.  He had several missing teeth but adequate dentition for mastication.  No asymmetry noted.    Oral Phase: Patient's oral phase of the swallow characterized by mild oral delay with purée consistency foods.  Patient had moderate oral delay, moderate oral holding and mild-moderate oral residual post swallow of to cracker trial.  He was able to clear to cracker trial to minimal with sips of thin liquids between bites of to cracker.  No leakage of food or liquid seen out of oral cavity during clinical swallow evaluation.    Pharyngeal Phase: Patient's pharyngeal phase of the swallow characterized by no overt signs symptoms aspiration during clinical swallow evaluation.  Speech therapist and nurse did witness coughing during patient's breakfast this a.m. during ICU rounding.      DIAGNOSTIC IMPRESSIONS:  Patient presents with a moderate oral dysphagia with solid foods.  He was able to use compensatory swallowing strategy of sips of liquid to clear oral residual from oral cavity.  No overt signs symptoms aspiration seen during clinical swallow evaluation.  CT revealed no acute findings in chest.  Commending diet downgrade to easy to chew with thin  liquids.  Also recommend that speech therapy continue to assess patient's ability to safely tolerate easy to chew diet with thin liquids to determine if a modified barium swallow study is warranted.    Medical Staff Made Aware: Yes      Plan:     SLP Plan: Skilled SLP Skilled speech therapy for dysphagia treatment is warranted in order to provide training and instruction regarding the use of compensatory swallow strategies, oropharyngeal strengthening exercises, and pt/caregiver education in order to reduce risk of aspiration, dehydration and malnutrition.  SLP Frequency: 3x per week   Duration: 30 days       Discussed POC: Patient, Nursing   Discussed Risks/Benefits: Yes, Patient, Nursing   Patient/Caregiver Agreeable: Yes   SLP Discharge Recommendations: unable to determine at this time, refer to subsequent notes     Subjective:      Patient seen at bedside for clinical swallow evaluation.  He was awake alert yet confused.  Nursing reported that patient had signs symptoms aspiration earlier today.  Speech therapist also witnessed coughing during patient's breakfast during ICU rounding this a.m.     General Visit Information:     Pt. Admitted  89 y.o. year old male patient with Past Medical History of  HTN, HLD, PAD, CAD s/p PATY to LAD and Cx (2013), ischemic cardiomyopathy, VT, HFrEF, pulmonary hypertension, sinus node dysfunction, loop recorder (changed 2/2024), bilateral carotid artery stenosis, Type 2 diabetes, hypothyroidism, GERD, OAB, CKD stage IV, BPH, urethral cancer s/sp nephrectomy, anxiety/ MDD, dementia, and frequent falls who presented to [facility] emergency department by EMS for fall. Reported that was ambulating to the bathroom last night when leg gave out causing to fall and was unable to get up off the floor. Denies hitting head or LOC/ syncope. Friend Lopez was unable to get a hold of patient by phone and went to check on patient finding on floor. Friend states patient has frequent falls due to  weakness in right leg/ knee.              Reason for Referral: Coughng while eating and drinking      Current Diet : Regular with thin liquids   Prior to Session Communication: Bedside nurse      Pain:     Pain Assessment  Pain Assessment: 0-10  0-10 (Numeric) Pain Score: 0 - No pain      Baseline Assessment:     Respiratory Status: Oxygen via nasal cannula   Patient Positioning: Upright in Bed      Goals:     1.  Patient will tolerate easy to chew with thin liquids without overt signs symptoms aspiration or pulmonary compromise.  Start Date: 01/10/25  Progress: No signs symptoms aspiration during clinical swallow evaluation however patent did have coughing during his breakfast this a.m.  Status: Continue goal    2.  Patient will use compensatory swallowing strategies including sitting upright while eating, alternating bites and sips to clear oral residuals and decreased rate of intake to reduce his risk of aspiration.  Start Date: 01/10/25  Progress: Patient required cueing during clinical swallow evaluation to use compensatory swallowing strategies.  Status: Continue goal    *Additional goals to be determined during treatment sessions or if modified barium swallow study is completed.     In Patient Education:   Pt. educated on safe swallow strategies, role of SLP, S/S of aspiration to be aware of, risks of aspiration, current swallow function, recommended diet  Patient unable to determine understanding

## 2025-01-10 NOTE — PROGRESS NOTES
01/10/25 1425   Discharge Planning   Living Arrangements Alone   Support Systems Friends/neighbors   Assistance Needed PTA - lives alone. Active with [facility] 3 times a week. Friend, Lopez, checks in on patient and helps when needed. Pt's spouse and children are .   Type of Residence Private residence   Home or Post Acute Services Post acute facilities (Rehab/SNF/etc)   Type of Post Acute Facility Services Long term care;Skilled nursing   Expected Discharge Disposition SNF   Does the patient need discharge transport arranged? Yes   RoundTrip coordination needed? Yes   Patient Choice   Provider Choice list and CMS website (https://medicare.gov/care-compare#search) for post-acute Quality and Resource Measure Data were provided and reviewed with: Patient;Family   Patient / Family choosing to utilize agency / facility established prior to hospitalization No     Pt fell at home during the night and was found on the ground by his friend who came to check on him. Pt's friend reports pt has been having more falls lately. He is active with [facility] having aides 3 times per week. SW saw pt and assisted with HPOA in which he named his friend Lopez. Most likely needs SNF with possible transition to LTC, pend PT/OT notes.

## 2025-01-10 NOTE — PROGRESS NOTES
Physical Therapy    Physical Therapy Evaluation    Patient Name: Cb Driver  MRN: [id number]  Department: [facility]  Room: [address] [place] [address]  Today's Date: 1/10/2025   Time Calculation  Start Time: 1445  Stop Time: 1509  Time Calculation (min): 24 min    Assessment/Plan   PT Assessment  PT Assessment Results: Decreased strength, Decreased range of motion, Decreased endurance, Impaired balance, Decreased mobility, Decreased safety awareness, Impaired hearing  Rehab Prognosis: Good  Barriers to Discharge Home: Physical needs, Cognition needs, Caregiver assistance  Evaluation/Treatment Tolerance:  (Treatment limited greatly to pt fatigue and weakness)  Assessment Comment: Pt would benefit from continued therapy to improve strength, ROM, balance, and functional mobility.  End of Session Patient Position: Bed, 3 rail up, Alarm on (Call button within reach)  IP OR SWING BED PT PLAN  Inpatient or Swing Bed: Inpatient  PT Plan  Treatment/Interventions: Bed mobility, Transfer training, Gait training, Balance training, Strengthening, Therapeutic exercise, Home exercise program  PT Plan: Ongoing PT  PT Frequency: 3 times per week  PT Discharge Recommendations: Moderate intensity level of continued care  PT Recommended Transfer Status: Assist x2, Assistive device  PT - OK to Discharge: Once medically appropriate    Subjective   General Visit Information:  General  Reason for Referral: Impaired mobility  Referred By: PT reffered by Zak 1/9/25  Past Medical History Relevant to Rehab: HTN, Dementia, CKL, Anxiety, anemia, DM, GERD, hypothyroidism, PAD, Pulmonary HTN  Prior to Session Communication: Bedside nurse  Patient Position Received: Bed, 3 rail up, Alarm off, not on at start of session  General Comment: Pt to ED 1/9 after a fall he sustained while walking to the bathroom. He was unable to get up. Friend, Lopez, came over to check on him when he found him on the floor. 1/9 (-) Flu A/B, (-) COVID, XR chest/pelvis (-), CT  "head/c-spine/abd/pelvis (-), CT thoracic (-), CT lumbar (-) acute, (+) degenerative changes of L2-L3, L5-S1  Home Living:  Home Living  Home Living Comments: Pt lives alone in a one story house with no stairs to enter. Has tub shower with chair and GB.  Prior Level of Function:  Prior Function Per Pt/Caregiver Report  Prior Function Comments: Pt ambulates with a powered scooter and WW primarily. Reports being indep with ADLs, and getting help from [employer] 3days/wk with cleaning. Needs assistance with Med management. Pt fell \"a couple times\" in the last 3 months and was unable to get himself up. Reports its bc his knees give out. Does not drive  Precautions:  Precautions  Hearing/Visual Limitations: [place]  Medical Precautions: Oxygen therapy device and L/min, Fall precautions       Objective   Pain:  Pain Assessment  Pain Assessment: 0-10  0-10 (Numeric) Pain Score: 0 - No pain  Cognition:  Cognition  Attention:  (easily distractible, required frequent reorient to task.)    General Assessments:  General Observation  General Observation: Pt in bed asleep prior to session with HOB elevated to 30 deg. Pt roused easily with verbal stimuli. Pt has redness in distal R LE. B knees are swollen. Brusing noted on back, R arm, and B legs. Pt has moderate leg length discrepancy, R longer than L. On 2L NC and none at baseline. Pt consents to PT evaluation.     Activity Tolerance  Endurance:  (Tolerance limited d/t pain and fatigue)    Static Sitting Balance  Static Sitting-Comment/Number of Minutes: Pt sat EOB with B foot suport, no UE support and SBA. Initially has R trunk lean but was able to correct with cueing.  Dynamic Sitting Balance  Dynamic Sitting-Comments:  (SBA/CGA durings strength testing. Pt unable to flex hip in sitting without moderate retropulsion. Decreased righting reflex to prevent falling backwards.)    Static Standing Balance  Static Standing-Comment/Number of Minutes:  (Pt stood EOB with WW and Max A x " 2. Pt had moderate retropulsion in standing. Unable to bring hips anteriorly despite cueing.)  Dynamic Standing Balance  Dynamic Standing-Comments:  (pt took 3 side steps to R with WW and max A x 2.)  Functional Assessments:  Bed Mobility  Supine <> sit EOB: Mod A x2. Pt able to roll onto R side using R BR. He brought legs off EOB with cueing, needed assist to advance trunk. Was retropulsive during trunk advancement. Supervision for hip scooting toward EOB. Supine scooting to HOB: Max A x 2 with blue pad and bed flat    Transfers  Sit <> stand: Max A x 2. Pt cued to push up off bed instead up walker, pt refused. Was able to initiate movement but unable to weight shift anteriorly and gain momentum. B feet were blocked to prevent sliding. Stood forward flexed and mild/mod retropulsion. R knee lacked 20 deg of extension in standing. Pt asked to reach back for bed before sitting back down, pt declined. Max A x 2 needed to decelerate Stand > sit transfer.     Ambulation/Gait Training  Ambulation/Gait Training Performed:  (Pt took 3 side steps toward HOB (R) with max A x 2 for balance and walker advancement. Pt shuffled feet across floor with minimal foot lift.)  Extremity/Trunk Assessments:  ROM  RUE :  Shoulder/elbow/wrist/hand ROM WFL  LUE:  Shoulder/elbow/wrist/hand ROM WFL  RLE :  hip flexion ROM:95 deg in sitting. Lacks 20 deg of knee extension in supine and standing. DF to neutral with firm end feel. Knee flexion WFL  LLE :  Hip flexion to 95 in sitting, DF to neutral with firm end feel. Knee flexion/extension: WFL    Strength  Strength Comments: UE strength WFL. B LE hip strength 3/5. B Knee extension: 4/5, B DF: 4+/5.    Outcome Measures:  [facility] Basic Mobility  Turning from your back to your side while in a flat bed without using bedrails: A lot  Moving from lying on your back to sitting on the side of a flat bed without using bedrails: A lot  Moving to and from bed to chair (including a wheelchair): A  lot  Standing up from a chair using your arms (e.g. wheelchair or bedside chair): A lot  To walk in hospital room: Total  Climbing 3-5 steps with railing: Total  Basic Mobility - Total Score: 10    Encounter Problems       Encounter Problems (Active)       PT Problem       Pt will completed supine <> sit bed mobility from flat bed with SBA  (Progressing)       Start:  01/10/25    Expected End:  01/24/25            Pt will demonstrate sit to stand transfers with WW + Min A  (Progressing)       Start:  01/10/25    Expected End:  01/24/25            Pt. will ambulate 30 with WW + Min A  (Not Progressing)       Start:  01/10/25    Expected End:  01/24/25            Pt will maintain balance while reaching outside DORA in sitting with B LE support and SBA  (Not Progressing)       Start:  01/10/25    Expected End:  01/24/25            Pt will complete B LE strengthening HEP Independently (Not Progressing)       Start:  01/10/25    Expected End:  01/24/25               Pain - Adult              Education Documentation  Mobility Training, taught by Giselle Bella, PT at 1/10/2025  3:45 PM.  Learner: Patient  Readiness: Acceptance  Method: Explanation  Response: Verbalizes Understanding, Demonstrated Understanding, Needs Reinforcement

## 2025-01-10 NOTE — CONSULTS
Social Work Note  The LSW met with the pt and friend, Lopez Nelson and Lopez's wife Aster Nelson [phone]. The pt wants his friend and his wife as his HC POA. The LSW assisted in the completing, signing and witnessing of the HC POA. The pt's friend's Lopez and Aster Nelson are his HC POA. The pt's friend is on the pt's bank accounts to help with the bill and mortgage payments. The pt has dual [facility] Medicare and the LSW informed Aster Nelson the [facility] does accept the [facility] dual insurance and be able to accept long term medicaid at this time if pt needed such care. Aster Nelson will be added to the pt's face sheet as a contact also.
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
## 2025-01-10 NOTE — CONSULTS
"Nutrition Initial Assessment:   Nutrition Assessment    Reason for Assessment: Admission nursing screening    Patient is a 89 y.o. male presenting with fall  Past Medical History of  HTN, HLD, PAD, CAD s/p PATY to LAD and Cx (2013), ischemic cardiomyopathy, VT, HFrEF, pulmonary hypertension, sinus node dysfunction, loop recorder (changed 2/2024), bilateral carotid artery stenosis, Type 2 diabetes, hypothyroidism, GERD, OAB, CKD stage IV, BPH, urethral cancer s/sp nephrectomy, anxiety/ MDD, dementia, and frequent falls who presented to [facility] emergency department by EMS for fall.       Nutrition History:  Energy Intake: Good > 75 % (no recorded meal intakes, breakfast tray observed during assessment)  Food and Nutrient History: RDN consult for MST score of 2. Pt with hx dementia, history is limited.  Met with pt who denies recent wt changes, states -140 lbs, reports being >200 lbs at one time but this was a long time ago. Pt reports good appetite at home, denies recent changes in appetite. Pt reports doing some cooking at home but most of food is brought over from people from [employer], was receiving Meals on Wheels at one time. Pt denies use of ONS at home, does report drinking flavored water at home. Pt denies GI symptoms, denies difficulty chewing or swallowing although he is edentuolus - SLP consulted for eval. Chart reviewed and noted significant wt loss in the past 1 month - will trial Ensure with meals and follow for acceptance.  Vitamin/Herbal Supplement Use: micro-K per home med list  Food Allergies/Intolerances:  None  GI Symptoms: None  Oral Problems: None       Anthropometrics:  Height: 172.7 cm (5' 7.99\")   Weight: 67.9 kg (149 lb 11.1 oz)   BMI (Calculated): 22.77  IBW/kg (Dietitian Calculated): 70 kg  Percent of IBW: 97 %       Weight History:   Wt Readings from Last 10 Encounters:   01/09/25 67.9 kg (149 lb 11.1 oz)   01/09/25 79.379 kg (175 lbs) - stated   12/03/24 80 kg (176 lb 6.4 oz)   11/26/24 " 81.2 kg (179 lb)   11/21/24 78.8 kg (173 lb 12.8 oz)   10/29/24 77.6 kg (171 lb)   10/23/24 75.8 kg (167 lb)   09/24/24 76.2 kg (167 lb 14.4 oz)   08/08/24 79.4 kg (175 lb)   07/30/24 78.3 kg (172 lb 9.6 oz)   07/19/24 80.7 kg (178 lb)      Weight Change %:  Weight History / % Weight Change: 27 lb, 15% significant wt loss in 1 month - will monitor wt trend  Significant Weight Loss: Yes  Interpretation of Weight Loss: >5% in 1 month    Nutrition Focused Physical Exam Findings:  Suspect some of muscle and fat losses due to advanced age of 89  Subcutaneous Fat Loss:   Orbital Fat Pads: Severe (dark circles, hollowing and loose skin)  Buccal Fat Pads: Mild-Moderate (flat cheeks, minimal bounce)  Triceps: Mild-Moderate (less than ample fat tissue)  Muscle Wasting:  Temporalis: Mild-Moderate (slight depression)  Pectoralis (Clavicular Region): Mild-Moderate (some protrusion of clavicle)  Deltoid/Trapezius: Mild-Moderate (slight protrusion of acromion process)  Edema:  Edema Location: nonpitting RLE  Physical Findings:  Skin: Positive (wounds noted to back, buttock, right pretibial, face and arms)    Nutrition Significant Labs:  BMP Trend:   Results from last 7 days   Lab Units 01/10/25  0403 01/09/25  2151 01/09/25  1559 01/09/25  1242   GLUCOSE mg/dL 95  --  58* 68*   CALCIUM mg/dL 8.1*  --  9.1 9.3   SODIUM mmol/L 137  --  138 135*   POTASSIUM mmol/L 5.2   < > 5.7* 6.9*   CO2 mmol/L 17*  --  15* 15*   CHLORIDE mmol/L 110*  --  112* 108*   BUN mg/dL 72*  --  75* 74*   CREATININE mg/dL 4.84*  --  4.92* 5.05*    < > = values in this interval not displayed.    , A1C:  Lab Results   Component Value Date    HGBA1C 5.7 (H) 10/22/2024   , BG POCT trend:   Results from last 7 days   Lab Units 01/10/25  0608 01/09/25  2115 01/09/25  1700 01/09/25  1633 01/09/25  1400   POCT GLUCOSE mg/dL 88 110* 111* 58* 171*        Nutrition Specific Medications:  Scheduled medications  insulin lispro, 0-5 Units, subcutaneous, Before meals &  nightly  levothyroxine, 50 mcg, oral, Once per day on Monday Tuesday Wednesday Thursday Friday Saturday  pantoprazole, 40 mg, oral, Daily before breakfast    Continuous medications  lactated Ringer's, 125 mL/hr, Last Rate: 125 mL/hr (01/10/25 0838)  lactated Ringer's, 125 mL/hr      I/O:   Last BM Date: 01/10/25; Stool Appearance: Formed (01/10/25 0406)    Dietary Orders (From admission, onward)       Start     Ordered    01/10/25 0646  May Participate in Room Service  ( ROOM SERVICE MAY PARTICIPATE)  Once        Question:  .  Answer:  Yes    01/10/25 0645    01/09/25 1538  Adult diet Regular  Diet effective now        Question:  Diet type  Answer:  Regular    01/09/25 1537                     Estimated Needs:   Total Energy Estimated Needs (kCal): 2037 kCal  Method for Estimating Needs: 30 kcal/kg ABW  Total Protein Estimated Needs (g): 81 g  Method for Estimating Needs: 1.2 g/kg ABW or as renal function allows  Total Fluid Estimated Needs (mL): 2037 mL  Method for Estimating Needs: 1 ml/kcal or per MD        Nutrition Diagnosis   Malnutrition Diagnosis  Patient has Malnutrition Diagnosis: Yes  Diagnosis Status: New  Malnutrition Diagnosis: Moderate malnutrition related to chronic disease or condition  As Evidenced by: >5% wt loss in the past month, moderate muscle wasting, moderate fat loss, suspect pt may be eating <75% of estimated energy needs given pt lives alone with hx dementia.            Nutrition Interventions/Recommendations         Nutrition Prescription:  Individualized Nutrition Prescription Provided for : Regular diet with ONS, texture/consistency per SLP        Nutrition Interventions:   Interventions: Meals and snacks, Medical food supplement  Meals and Snacks: General healthful diet  Goal: Consumes 3 meals per day  Medical Food Supplement: Commercial beverage  Goal: Ensure Plus High Protein BID (provides 350 kcal, 20 g protein per serving).    Collaboration and Referral of Nutrition Care:  Collaboration by nutrition professional with other providers  Goal: IDT rounds    Nutrition Education:   No needs at this time       Nutrition Monitoring and Evaluation   Food/Nutrient Related History Monitoring  Monitoring and Evaluation Plan: Energy intake, Amount of food, Fluid intake  Energy Intake: Estimated energy intake  Criteria: Pt meets >75% of estimated energy needs  Fluid Intake: Estimated fluid intake  Criteria: Meets >75% of estimated fluid needs  Amount of Food: Estimated amout of food, Medical food intake  Criteria: Pt consumes >75% of meals and supplements    Body Composition/Growth/Weight History  Monitoring and Evaluation Plan: Weight  Weight: Measured weight  Criteria: Maintains stable weight    Biochemical Data, Medical Tests and Procedures  Monitoring and Evaluation Plan: Glucose/endocrine profile, Electrolyte/renal panel  Electrolyte and Renal Panel: Sodium, Potassium, Phosphorus  Criteria: Electrolytes WNL  Glucose/Endocrine Profile: Glucose, casual  Criteria: BG within desirable range              Time Spent (min): 40 minutes

## 2025-01-11 LAB
25(OH)D3 SERPL-MCNC: 9 NG/ML (ref 30–100)
ALBUMIN SERPL BCP-MCNC: 2.7 G/DL (ref 3.4–5)
ALP SERPL-CCNC: 97 U/L (ref 33–136)
ALT SERPL W P-5'-P-CCNC: 30 U/L (ref 10–52)
ANION GAP SERPL CALC-SCNC: 10 MMOL/L (ref 10–20)
AST SERPL W P-5'-P-CCNC: 90 U/L (ref 9–39)
BILIRUB SERPL-MCNC: 0.7 MG/DL (ref 0–1.2)
BUN SERPL-MCNC: 66 MG/DL (ref 6–23)
CALCIUM SERPL-MCNC: 7.7 MG/DL (ref 8.6–10.3)
CHLORIDE SERPL-SCNC: 111 MMOL/L (ref 98–107)
CK SERPL-CCNC: 1780 U/L (ref 0–325)
CO2 SERPL-SCNC: 17 MMOL/L (ref 21–32)
CREAT SERPL-MCNC: 3.71 MG/DL (ref 0.5–1.3)
EGFRCR SERPLBLD CKD-EPI 2021: 15 ML/MIN/1.73M*2
ERYTHROCYTE [DISTWIDTH] IN BLOOD BY AUTOMATED COUNT: 14.7 % (ref 11.5–14.5)
GLUCOSE BLD MANUAL STRIP-MCNC: 121 MG/DL (ref 74–99)
GLUCOSE BLD MANUAL STRIP-MCNC: 135 MG/DL (ref 74–99)
GLUCOSE BLD MANUAL STRIP-MCNC: 150 MG/DL (ref 74–99)
GLUCOSE BLD MANUAL STRIP-MCNC: 93 MG/DL (ref 74–99)
GLUCOSE SERPL-MCNC: 97 MG/DL (ref 74–99)
HCT VFR BLD AUTO: 25.5 % (ref 41–52)
HGB BLD-MCNC: 8.4 G/DL (ref 13.5–17.5)
HOLD SPECIMEN: NORMAL
MAGNESIUM SERPL-MCNC: 1.95 MG/DL (ref 1.6–2.4)
MCH RBC QN AUTO: 31.5 PG (ref 26–34)
MCHC RBC AUTO-ENTMCNC: 32.9 G/DL (ref 32–36)
MCV RBC AUTO: 96 FL (ref 80–100)
NRBC BLD-RTO: 0 /100 WBCS (ref 0–0)
PHOSPHATE SERPL-MCNC: 3.3 MG/DL (ref 2.5–4.9)
PLATELET # BLD AUTO: 130 X10*3/UL (ref 150–450)
POTASSIUM SERPL-SCNC: 4.3 MMOL/L (ref 3.5–5.3)
PROT SERPL-MCNC: 4.4 G/DL (ref 6.4–8.2)
PTH-INTACT SERPL-MCNC: 166.3 PG/ML (ref 18.5–88)
RBC # BLD AUTO: 2.67 X10*6/UL (ref 4.5–5.9)
SODIUM SERPL-SCNC: 134 MMOL/L (ref 136–145)
WBC # BLD AUTO: 3.6 X10*3/UL (ref 4.4–11.3)

## 2025-01-11 PROCEDURE — 2500000001 HC RX 250 WO HCPCS SELF ADMINISTERED DRUGS (ALT 637 FOR MEDICARE OP): Performed by: INTERNAL MEDICINE

## 2025-01-11 PROCEDURE — 99232 SBSQ HOSP IP/OBS MODERATE 35: CPT | Performed by: INTERNAL MEDICINE

## 2025-01-11 PROCEDURE — 82550 ASSAY OF CK (CPK): CPT | Performed by: NURSE PRACTITIONER

## 2025-01-11 PROCEDURE — 82306 VITAMIN D 25 HYDROXY: CPT | Performed by: STUDENT IN AN ORGANIZED HEALTH CARE EDUCATION/TRAINING PROGRAM

## 2025-01-11 PROCEDURE — 2500000002 HC RX 250 W HCPCS SELF ADMINISTERED DRUGS (ALT 637 FOR MEDICARE OP, ALT 636 FOR OP/ED): Performed by: NURSE PRACTITIONER

## 2025-01-11 PROCEDURE — 85027 COMPLETE CBC AUTOMATED: CPT | Performed by: NURSE PRACTITIONER

## 2025-01-11 PROCEDURE — 80053 COMPREHEN METABOLIC PANEL: CPT | Performed by: NURSE PRACTITIONER

## 2025-01-11 PROCEDURE — 84100 ASSAY OF PHOSPHORUS: CPT | Performed by: NURSE PRACTITIONER

## 2025-01-11 PROCEDURE — 82947 ASSAY GLUCOSE BLOOD QUANT: CPT

## 2025-01-11 PROCEDURE — 83735 ASSAY OF MAGNESIUM: CPT | Performed by: NURSE PRACTITIONER

## 2025-01-11 PROCEDURE — 83970 ASSAY OF PARATHORMONE: CPT | Mod: ELYLAB | Performed by: STUDENT IN AN ORGANIZED HEALTH CARE EDUCATION/TRAINING PROGRAM

## 2025-01-11 PROCEDURE — 1210000001 HC SEMI-PRIVATE ROOM DAILY

## 2025-01-11 PROCEDURE — 36415 COLL VENOUS BLD VENIPUNCTURE: CPT | Performed by: STUDENT IN AN ORGANIZED HEALTH CARE EDUCATION/TRAINING PROGRAM

## 2025-01-11 PROCEDURE — 2500000001 HC RX 250 WO HCPCS SELF ADMINISTERED DRUGS (ALT 637 FOR MEDICARE OP): Performed by: NURSE PRACTITIONER

## 2025-01-11 PROCEDURE — 2500000004 HC RX 250 GENERAL PHARMACY W/ HCPCS (ALT 636 FOR OP/ED): Performed by: NURSE PRACTITIONER

## 2025-01-11 RX ORDER — CHOLECALCIFEROL (VITAMIN D3) 25 MCG
2000 TABLET ORAL DAILY
Status: DISPENSED | OUTPATIENT
Start: 2025-01-11

## 2025-01-11 RX ADMIN — TAMSULOSIN HYDROCHLORIDE 0.4 MG: 0.4 CAPSULE ORAL at 08:59

## 2025-01-11 RX ADMIN — PANTOPRAZOLE SODIUM 40 MG: 40 TABLET, DELAYED RELEASE ORAL at 05:59

## 2025-01-11 RX ADMIN — Medication 2000 UNITS: at 17:24

## 2025-01-11 RX ADMIN — CILOSTAZOL 50 MG: 50 TABLET ORAL at 08:59

## 2025-01-11 RX ADMIN — HEPARIN SODIUM 5000 UNITS: 5000 INJECTION INTRAVENOUS; SUBCUTANEOUS at 08:59

## 2025-01-11 RX ADMIN — HEPARIN SODIUM 5000 UNITS: 5000 INJECTION INTRAVENOUS; SUBCUTANEOUS at 00:08

## 2025-01-11 RX ADMIN — CILOSTAZOL 50 MG: 50 TABLET ORAL at 20:24

## 2025-01-11 RX ADMIN — LEVOTHYROXINE SODIUM 50 MCG: 0.05 TABLET ORAL at 05:59

## 2025-01-11 RX ADMIN — CLOPIDOGREL BISULFATE 75 MG: 75 TABLET, FILM COATED ORAL at 08:59

## 2025-01-11 RX ADMIN — HEPARIN SODIUM 5000 UNITS: 5000 INJECTION INTRAVENOUS; SUBCUTANEOUS at 17:24

## 2025-01-11 RX ADMIN — SODIUM CHLORIDE, POTASSIUM CHLORIDE, SODIUM LACTATE AND CALCIUM CHLORIDE 125 ML/HR: 600; 310; 30; 20 INJECTION, SOLUTION INTRAVENOUS at 05:59

## 2025-01-11 ASSESSMENT — PAIN SCALES - GENERAL
PAINLEVEL_OUTOF10: 0 - NO PAIN
PAINLEVEL_OUTOF10: 0 - NO PAIN

## 2025-01-11 ASSESSMENT — COGNITIVE AND FUNCTIONAL STATUS - GENERAL
MOVING FROM LYING ON BACK TO SITTING ON SIDE OF FLAT BED WITH BEDRAILS: A LITTLE
CLIMB 3 TO 5 STEPS WITH RAILING: A LOT
TURNING FROM BACK TO SIDE WHILE IN FLAT BAD: A LOT
DAILY ACTIVITIY SCORE: 16
DRESSING REGULAR UPPER BODY CLOTHING: A LITTLE
PERSONAL GROOMING: A LITTLE
MOVING TO AND FROM BED TO CHAIR: A LITTLE
DRESSING REGULAR LOWER BODY CLOTHING: A LOT
MOBILITY SCORE: 19
CLIMB 3 TO 5 STEPS WITH RAILING: A LITTLE
TURNING FROM BACK TO SIDE WHILE IN FLAT BAD: A LITTLE
STANDING UP FROM CHAIR USING ARMS: A LITTLE
MOBILITY SCORE: 13
MOVING TO AND FROM BED TO CHAIR: A LOT
STANDING UP FROM CHAIR USING ARMS: A LOT
MOVING FROM LYING ON BACK TO SITTING ON SIDE OF FLAT BED WITH BEDRAILS: A LITTLE
TOILETING: A LOT
WALKING IN HOSPITAL ROOM: A LOT
HELP NEEDED FOR BATHING: A LOT

## 2025-01-11 NOTE — NURSING NOTE
RRN 12hr follow-up:  Down graded 01/10/25. VSS. 92% 2LNC. A&Ox4 with intermittent confusion. K stable at 5.2. Nephrology is following. Blood sugars stable. Speech following for possible aspiration. No concerns from nursing staff at this time. Will continue to follow.

## 2025-01-11 NOTE — PROGRESS NOTES
Medical Group Progress Note  ASSESSMENT & PLAN:     Fall  Rhabdomyolysis  LESLIE on CKDIV  Hyperkalemia  -renal function improved with IVF  -CK coming down  -cont with IVF until expires; oral intake ok will cont with oral hydration thereafter  -nephro following  -PT/OT - likely dispo is SNF    Elevated LFTs  -improving, likely 2/2 rhabdo    Hypotension  -improved with IVF  -hold antihypertensives    Chronic systolic CHF  VT  PAD  HTN  HLD  -stable, cont home medications    DM2  -cont correctional insulin scale            Malnutrition Diagnosis Status: New  Malnutrition Diagnosis: Moderate malnutrition related to chronic disease or condition  As Evidenced by: >5% wt loss in the past month, moderate muscle wasting, moderate fat loss, suspect pt may be eating <75% of estimated energy needs given pt lives alone with hx dementia.  I agree with the dietitian's malnutrition diagnosis.        VTE Prophylaxis: heparin      Chon Arthur MD    SUBJECTIVE     No overnight events. Feeling well today. No pain. No chest pain or dyspnea. Tolerating diet.    OBJECTIVE:     Last Recorded Vitals:  Vitals:    01/10/25 1400 01/10/25 2016 01/11/25 0600 01/11/25 0738   BP: (!) 120/48 104/54  117/65   BP Location: Right arm Right arm     Patient Position: Lying Lying     Pulse: 86 80  81   Resp: 19 17     Temp: 36 °C (96.8 °F) 36.7 °C (98.1 °F)  36.4 °C (97.5 °F)   TempSrc: Temporal Temporal     SpO2: 96% 92%  96%   Weight:   80.7 kg (177 lb 14.6 oz)    Height:         Last I/O:  I/O last 3 completed shifts:  In: 6287 (77.9 mL/kg) [P.O.:460; I.V.:4327 (53.6 mL/kg); IV Piggyback:1500]  Out: 1686 (20.9 mL/kg) [Urine:1686 (0.6 mL/kg/hr)]  Weight: 80.7 kg     Physical Exam  GEN: healthy appearing, appears stated age, NAD  HEENT: NCAT, PERRLA, EOMI, moist mucous membranes  NECK: supple, no masses  CV: RRR, no m/r/g, no LE edema  LUNGS: CTAB, no w/r/c  ABD: soft, NT, ND, NBS  SKIN: no rashes  MSK; no gross deformities, normal  joints  NEURO: A+Ox3, no FND  PSYCH: appropriate mood, affect      Inpatient Medications:  cilostazol, 50 mg, oral, BID  [Held by provider] citalopram, 20 mg, oral, Daily  clopidogrel, 75 mg, oral, Daily  [Transfer Hold] epoetin mychal or biosimilar, 100 Units/kg, subcutaneous, q7 days  heparin (porcine), 5,000 Units, subcutaneous, q8h  insulin lispro, 0-5 Units, subcutaneous, Before meals & nightly  levothyroxine, 50 mcg, oral, Once per day on Monday Tuesday Wednesday Thursday Friday Saturday  pantoprazole, 40 mg, oral, Daily before breakfast  tamsulosin, 0.4 mg, oral, Daily    PRN Medications  PRN medications: dextrose, dextrose, glucagon, glucagon  Continuous Medications:  lactated Ringer's, 125 mL/hr, Last Rate: 125 mL/hr (01/11/25 0559)      LABS AND IMAGING:     Labs:  Results from last 7 days   Lab Units 01/11/25  0657 01/10/25  0403 01/09/25  1242   WBC AUTO x10*3/uL 3.6* 5.5 7.1   RBC AUTO x10*6/uL 2.67* 2.79* 3.66*   HEMOGLOBIN g/dL 8.4* 8.8* 11.4*   HEMATOCRIT % 25.5* 26.0* 35.0*   MCV fL 96 93 96   MCH pg 31.5 31.5 31.1   MCHC g/dL 32.9 33.8 32.6   RDW % 14.7* 14.7* 14.6*   PLATELETS AUTO x10*3/uL 130* 149* 176     Results from last 7 days   Lab Units 01/11/25  0657 01/10/25  0403 01/09/25  2151 01/09/25  1559 01/09/25  1242   SODIUM mmol/L 134* 137  --  138 135*   POTASSIUM mmol/L 4.3 5.2 5.6* 5.7* 6.9*   CHLORIDE mmol/L 111* 110*  --  112* 108*   CO2 mmol/L 17* 17*  --  15* 15*   BUN mg/dL 66* 72*  --  75* 74*   CREATININE mg/dL 3.71* 4.84*  --  4.92* 5.05*   GLUCOSE mg/dL 97 95  --  58* 68*   PROTEIN TOTAL g/dL 4.4* 4.9*  --   --  6.7   CALCIUM mg/dL 7.7* 8.1*  --  9.1 9.3   BILIRUBIN TOTAL mg/dL 0.7 0.8  --   --  1.3*   ALK PHOS U/L 97 108  --   --  159*   AST U/L 90* 120*  --   --  147*   ALT U/L 30 30  --   --  35     Results from last 7 days   Lab Units 01/11/25  0657 01/10/25  0403   MAGNESIUM mg/dL 1.95 2.13     Results from last 7 days   Lab Units 01/09/25  1420 01/09/25  1242   TROPHS ng/L 80*  101*     Imaging:  CT chest abdomen pelvis wo IV contrast, CT thoracic spine wo IV contrast, CT lumbar spine wo IV contrast  Narrative: Interpreted By:  Joni Burden,   STUDY:  CT CHEST ABDOMEN PELVIS WO CONTRAST; CT THORACIC SPINE WO IV  CONTRAST; CT LUMBAR SPINE WO IV CONTRAST;  1/9/2025 3:35 pm; 1/9/2025  3:36 pm      INDICATION:  Signs/Symptoms:fall, LESLIE, rhabdo; Signs/Symptoms:fall.          COMPARISON:  Frontal pelvis and portable chest from earlier same day 9 January 2025; CT abdomen and pelvis without contrast 19 July 2024 and several  others back through the oldest available, 26 June 2006      ACCESSION NUMBER(S):  [id number]; [id number]; [id number]      ORDERING CLINICIAN:  MARY DANG      TECHNIQUE:  CT chest, abdomen and pelvis without intravenous or oral contrast      CT thoracic spine without IV contrast      CT lumbar spine without IV contrast      FINDINGS:  CT CHEST      LUNGS / AIRSPACES / AIRWAYS:      LARGE AIRWAYS  Filling defect: Single thin web-like secretion in the right mainstem  bronchus and bronchus intermedius. No polypoid or otherwise masslike  filling defect Wall thickening: Negative  Bronchiectasis: Negative  Other: N/A      AIRSPACES  Fibrosis: Negative  Emphysema: Negative  Consolidation: Negative  Ground glass airspace disease: Negative  Edema: Negative  Nodule / Mass: Negative  Other: N/A      PLEURA:  Effusion: Both sides negative  Pneumothorax: Both sides negative  Other: n/a      CARDIOVASCULAR:  Heart size: Within normal limits  Pericardial effusion: Negative  Thoracic aortic aneurysm: Negative  Pulmonary arteries: No ectasia  Heart failure change: Negative.  No sign of interstitial or alveolar  edema. Other: n/a      NONVASCULAR MEDIASTINUM:  Esophagus: Grossly normal by CT  Mediastinal Mass: Negative  Hiatal hernia: Large compound type, including majority of the stomach  as well as nonobstructive length of mid transverse colon. On CT 19 July 2024, none of the  colon within the hernia Other: n/a      LYMPH NODES:  No thoracic adenopathy      CHEST WALL:  Soft tissues of the chest wall are unremarkable      SKELETON:  No acute findings, traumatic or otherwise, of bones on CT thoracic  spine and all of the other bones included on complete CT chest,  abdomen and pelvis not included on the spine CT      Exaggerated thoracic kyphosis. Findings of diffuse idiopathic  skeletal hyperostosis in the mid and lower thoracic spine      -------      CT ABDOMEN AND PELVIS      LIVER: Lobular but not nodular or fatty. Not enlarged. No obvious  focal abnormality on this exam without contrast      SPLEEN: Normal. No enlargement.      PANCREAS: Normal. No CT evidence of acute or chronic pancreatitis. No  obvious  duct dilation. No gross evidence of a mass, noting low  sensitivity and specificity without IV contrast.      GALLBLADDER: Normal CT appearance. No dilation, calcified, or  gas-containing stones.  Other types of gallstones could be occult on  CT and detectable only by ultrasound.      BILE DUCTS: Normal. No biliary duct dilation.      ADRENAL GLANDS: Normal. No nodule or mass.      KIDNEYS AND URETERS: Left kidney absent. Large but simple right renal  cyst. Few smaller right renal lesions that do not qualify as simple  cysts, technically indeterminate but unchanged. No hydronephrosis or  stone or any other acute finding      LYMPH NODES: No adenopathy, intraperitoneal, retroperitoneal, pelvic,  inguinal or otherwise.      APPENDIX: Normal.  Not dilated, thick walled or in any other way  inflamed in appearance.  No inflammatory change about the appendix.      COLON: Normal. No sign of acute diverticulitis or other colitis. No  annular constricting mass.      SMALL BOWEL: Normal. No small bowel dilation or any other sign of  small bowel obstruction.      STOMACH / DUODENUM: Grossly normal by CT which has limited  sensitivity and specificity for the stomach and duodenum.       RETROPERITONEUM: Normal.  No acute hemorrhage or inflammatory change.  Lymph nodes in a separate dedicated section.      OMENTUM, MESENTERY AND PERITONEAL SPACES:  Free intraperitoneal air: Negative  Free intraperitoneal fluid: Negative  Abscess: Negative  Other: Rim calcified soft tissue attenuation body in the  retroperitoneal fat left lower quadrant was all the way back on CT  from 15 July 2010 when the CT appearance was diagnostic of fat  necrosis. It has since calcified and otherwise evolved but is not an  acute finding or finding requiring follow-up      URINARY BLADDER: Normal. No wall thickening, large diverticula,  radiodense stone or surrounding inflammatory change.      PELVIS: Inguinal canal abnormalities probably reflect hydroceles and  portions of the testes      VASCULATURE: Unenhanced CT appearance within normal limits. No  abdominal aortic aneurysm or other acute finding.      ABDOMINAL WALL:  Hernia: Negative  Other: No acute or contributory abnormality.      SKELETON:  No acute findings, traumatic or otherwise, of bones on CT lumbar  spine and all of the other bones included on complete CT chest,  abdomen and pelvis not included on the spine CT      Minimal retrolisthesis of L2 on L3. Alignment otherwise anatomic. No  pars defects. Degenerative changes most pronounced at L2-3 and L5-S1      Impression: No acute findings in the chest, abdomen or pelvis, traumatic or  otherwise, including all included bones (of both spine CTs and all of  the other bones included on complete CT chest, abdomen pelvis that  were not included on the spine CTs)      On CT 19 July 2024, only the majority of the stomach was above the  diaphragm in this patient with hiatal hernia; today, same length of  nearly the entire stomach, but also a substantial length of  nonobstructed mid transverse colon is now in the hernia, making it a  compound (type 4) hiatal hernia. No obstruction associated with it      No other  potentially unexpected contributory new abnormalities in the  chest; none at all in the abdomen or pelvis      This report serves as the diagnostic interpretation for three exams  performed concurrently: CT chest, abdomen and pelvis without  contrast; CT thoracic spine without contrast; CT lumbar spine without  contrast      MACRO:  None      Signed by: Joni Burden 1/9/2025 3:54 PM  Dictation workstation:   [id number]  ECG 12 lead  Sinus rhythm with 1st degree AV block  Low voltage QRS  Cannot rule out Anterior infarct (cited on or before 19-JUL-2024)  T wave abnormality, consider inferior ischemia  Abnormal ECG  When compared with ECG of 09-JAN-2025 12:40, (unconfirmed)  Previous ECG has undetermined rhythm, needs review  Questionable change in initial forces of Septal leads  XR pelvis 1-2 views  Narrative: STUDY:  Pelvis Radiographs; 1/9/2025 1:04 PM.  INDICATION:  Trauma.  COMPARISON:  CT abdomen pelvis 7/19/2024.  ACCESSION NUMBER(S):  [id number]  ORDERING CLINICIAN:  PERLITA VALDEZ  TECHNIQUE:  One view of the pelvis.  FINDINGS:    The pelvic ring is intact.  There is no acute fracture.    Impression: Unremarkable pelvis.  Signed by Jose Connor MD  XR chest 1 view  Narrative: STUDY:  Chest Radiograph;  1/9/2025 1:04PM  INDICATION:  Trauma.  COMPARISON:  XR Chest: 7/19/2024.  ACCESSION NUMBER(S):  [id number]  ORDERING CLINICIAN:  PERLITA VALDEZ  TECHNIQUE:  Frontal chest was obtained at 13:03 hours.  FINDINGS:  Loop recorder redemonstrated.  Cardiac silhouette normal in size.  No significant pulmonary vascular congestion.   No significant pleural effusion.   No visible pneumothorax.   Impression: No acute radiographic chest finding.  Signed by Adalberto To MD  CT head W O contrast trauma protocol, CT cervical spine wo IV contrast  Narrative: Interpreted By:  Jay Malone,   STUDY:  CT HEAD W/O CONTRAST TRAUMA PROTOCOL; CT CERVICAL SPINE WO IV  CONTRAST;  1/9/2025 12:51 pm       INDICATION:  Signs/Symptoms:Trauma.      COMPARISON:  CT head 03/06/2023      ACCESSION NUMBER(S):  [id number]; [id number]      ORDERING CLINICIAN:  PERLITA VALDEZ      TECHNIQUE:  CT scan of the head and cervical spine was performed without  intravenous contrast.      FINDINGS:  CT HEAD:      Parenchyma: Right frontal encephalomalacia related to remote infarct  is unchanged. No evidence of acute large vessel territory infarct. No  intracranial hemorrhage. No mass effect or midline shift. Scattered  periventricular white matter hypodensities, likely chronic  microvascular ischemic change. Moderate parenchymal atrophy.      CSF Spaces: The ventricles, sulci and basal cisterns are proportional  to the degree of parenchymal volume loss.      Extra-Axial Fluid: None.      Calvarium: No acute depressed fracture.      Paranasal sinuses: Clear.      Mastoids: Partial opacification of air cells on the left.      Orbits: No acute abnormality.      Soft tissues: No scalp hematoma.          CT CERVICAL SPINE:      Prevertebral/Paraspinal Soft Tissues: No acute abnormalities.      Hardware: None.  Fracture: No acute fracture.  Vertebral Body Heights: Maintained.  Alignment: No traumatic listhesis. No facet subluxation.  Spinal canal and neural foramina: Multilevel degenerative disc  disease, most pronounced at C4-C5 and C5-C6 with severe bilateral  neural foraminal narrowing. No high-grade canal stenosis.      Impression: No acute intracranial hemorrhage or depressed calvarial fracture.      No acute fracture or traumatic malalignment of the cervical spine.      MACRO  None      Signed by: Jay Malone 1/9/2025 1:05 PM  Dictation workstation:   [id number]

## 2025-01-11 NOTE — PROGRESS NOTES
Renal Progress Note    Assessment:  89 y.o. male with history s/f dementia, HTN, HLD, CAD s/p PATY to LAD/LCX (2013), HFpEF, VT, pHTN, SND, loop recorder, T2DM, hypothyroidism, GERD, CKD stage III/IV, BPH, urethral cancer s/p LT nephrectomy, anxiety/MDD and frequent falls who presented for a fall.     LESLIE on CKD stage III/IV: has RT solitary kidney, multifactorial, most likely in setting of hypotension and rhabdomyolysis +/- volume depletion, on lasix as outpatient, states PO is ok but patient lives alone, CKD in setting of LT nephrectomy, T2DM, HTN, CHF, UA w/ bact, hematuria, hyaline casts, no proteinuria   Hypotension  Frequent falls   Metabolic acidosis: 2/2 renal failure   Hyperkalemia: in setting of LESLIE, rhabdomyolysis and outpatient K supplementation  Rhabdomyolysis   Hypoalbuminemia  Anemia of renal disease      Plan:  - no indication for RRT at this time   - since K improving no need to change IVFs from LR at this time  - trending K, ok to stop lokelma after next dose   - retacrit for anemia   - checking bone mineral labs reviewed vitamin D deficiency of 9 and PTH intact of 166 patient corrected calcium 8.7.  Vitamin D    Subjective:   Admit Date: 1/9/2025    Interval History: Seen and examined uneventful night no uremic related or fluid volume overload related symptoms alert pleasant follow command in no apparent pain or distress      Medications:   Scheduled Meds:cilostazol, 50 mg, oral, BID  [Held by provider] citalopram, 20 mg, oral, Daily  clopidogrel, 75 mg, oral, Daily  [Transfer Hold] epoetin mychal or biosimilar, 100 Units/kg, subcutaneous, q7 days  heparin (porcine), 5,000 Units, subcutaneous, q8h  insulin lispro, 0-5 Units, subcutaneous, Before meals & nightly  levothyroxine, 50 mcg, oral, Once per day on Monday Tuesday Wednesday Thursday Friday Saturday  pantoprazole, 40 mg, oral, Daily before breakfast  tamsulosin, 0.4 mg, oral, Daily      Continuous Infusions:lactated Ringer's, 125 mL/hr, Last  "Rate: 125 mL/hr (01/11/25 0559)        CBC:   Lab Results   Component Value Date    HGB 8.4 (L) 01/11/2025    HGB 8.8 (L) 01/10/2025    WBC 3.6 (L) 01/11/2025    WBC 5.5 01/10/2025     (L) 01/11/2025     (L) 01/10/2025      Anemia:  No results found for: \"FERRITIN\", \"IRON\", \"TIBC\"   BMP:    Lab Results   Component Value Date     (L) 01/11/2025     01/10/2025    K 4.3 01/11/2025    K 5.2 01/10/2025     (H) 01/11/2025     (H) 01/10/2025    CO2 17 (L) 01/11/2025    CO2 17 (L) 01/10/2025    BUN 66 (H) 01/11/2025    BUN 72 (H) 01/10/2025    CREATININE 3.71 (H) 01/11/2025    CREATININE 4.84 (H) 01/10/2025      Bone disease:   Lab Results   Component Value Date    PHOS 3.3 01/11/2025    .3 (H) 01/11/2025    VITD25 9 (L) 01/11/2025      Urinalysis:    Lab Results   Component Value Date    KUN 5.0 01/09/2025    PROTUR 20 (TRACE) 01/09/2025    GLUCOSEU Normal 01/09/2025    BLOODU 1.0 (3+) (A) 01/09/2025    KETONESU NEGATIVE 01/09/2025    BILIRUBINU NEGATIVE 01/09/2025    NITRITEU NEGATIVE 01/09/2025    LEUKOCYTESU NEGATIVE 01/09/2025        Objective:   Vitals: /65   Pulse 81   Temp 36.4 °C (97.5 °F)   Resp 17   Ht 1.727 m (5' 7.99\")   Wt 80.7 kg (177 lb 14.6 oz)   SpO2 96%   BMI 27.06 kg/m²    Wt Readings from Last 3 Encounters:   01/11/25 80.7 kg (177 lb 14.6 oz)   12/03/24 80 kg (176 lb 6.4 oz)   11/26/24 81.2 kg (179 lb)      24HR INTAKE/OUTPUT:    Intake/Output Summary (Last 24 hours) at 1/11/2025 1632  Last data filed at 1/11/2025 0608  Gross per 24 hour   Intake 2150 ml   Output 950 ml   Net 1200 ml     Admission weight:  Weight: 79.4 kg (175 lb)      Constitutional:  Alert, awake, no apparent distress   Skin:normal, no rash  HEENT:sclera anicteric.  Head atraumatic normocephalic  Neck:supple with no thyromegally  Cardiovascular:  S1, S2 without m/r/g   Respiratory:  CTA B without w/r/r   Abdomen: +bs, soft, nt  Ext: no LE " edema  Musculoskeletal:Intact  Neuro:Alert and oriented with no deficit      Electronically signed by Mayank Young MD on 1/11/2025 at 4:32 PM

## 2025-01-11 NOTE — CARE PLAN
The patient's goals for the shift include      The clinical goals for the shift include patient will remain safe and free from injury/falls through out shift      Problem: Safety - Adult  Goal: Free from fall injury  Outcome: Progressing     Problem: Fall/Injury  Goal: Verbalize understanding of personal risk factors for fall in the hospital  Outcome: Progressing  Goal: Use assistive devices by end of the shift  Outcome: Progressing  Goal: Pace activities to prevent fatigue by end of the shift  Outcome: Progressing     Over the shift, the patient did make progress toward the following goals. Barriers to progression include none. Recommendations to address these barriers include continue with current plan of care.

## 2025-01-12 LAB
ALBUMIN SERPL BCP-MCNC: 2.6 G/DL (ref 3.4–5)
ALP SERPL-CCNC: 97 U/L (ref 33–136)
ALT SERPL W P-5'-P-CCNC: 26 U/L (ref 10–52)
ANION GAP SERPL CALC-SCNC: 8 MMOL/L (ref 10–20)
AST SERPL W P-5'-P-CCNC: 65 U/L (ref 9–39)
BACTERIA BLD CULT: NORMAL
BACTERIA BLD CULT: NORMAL
BILIRUB SERPL-MCNC: 0.7 MG/DL (ref 0–1.2)
BUN SERPL-MCNC: 59 MG/DL (ref 6–23)
CALCIUM SERPL-MCNC: 7.7 MG/DL (ref 8.6–10.3)
CHLORIDE SERPL-SCNC: 114 MMOL/L (ref 98–107)
CO2 SERPL-SCNC: 16 MMOL/L (ref 21–32)
CREAT SERPL-MCNC: 3.02 MG/DL (ref 0.5–1.3)
EGFRCR SERPLBLD CKD-EPI 2021: 19 ML/MIN/1.73M*2
ERYTHROCYTE [DISTWIDTH] IN BLOOD BY AUTOMATED COUNT: 15.1 % (ref 11.5–14.5)
GLUCOSE BLD MANUAL STRIP-MCNC: 118 MG/DL (ref 74–99)
GLUCOSE BLD MANUAL STRIP-MCNC: 124 MG/DL (ref 74–99)
GLUCOSE BLD MANUAL STRIP-MCNC: 139 MG/DL (ref 74–99)
GLUCOSE BLD MANUAL STRIP-MCNC: 97 MG/DL (ref 74–99)
GLUCOSE SERPL-MCNC: 99 MG/DL (ref 74–99)
HCT VFR BLD AUTO: 27.3 % (ref 41–52)
HGB BLD-MCNC: 8.8 G/DL (ref 13.5–17.5)
MAGNESIUM SERPL-MCNC: 1.8 MG/DL (ref 1.6–2.4)
MCH RBC QN AUTO: 31.5 PG (ref 26–34)
MCHC RBC AUTO-ENTMCNC: 32.2 G/DL (ref 32–36)
MCV RBC AUTO: 98 FL (ref 80–100)
NRBC BLD-RTO: 0 /100 WBCS (ref 0–0)
PHOSPHATE SERPL-MCNC: 2.4 MG/DL (ref 2.5–4.9)
PLATELET # BLD AUTO: 129 X10*3/UL (ref 150–450)
POTASSIUM SERPL-SCNC: 4.7 MMOL/L (ref 3.5–5.3)
PROT SERPL-MCNC: 4.4 G/DL (ref 6.4–8.2)
RBC # BLD AUTO: 2.79 X10*6/UL (ref 4.5–5.9)
SODIUM SERPL-SCNC: 133 MMOL/L (ref 136–145)
WBC # BLD AUTO: 3.8 X10*3/UL (ref 4.4–11.3)

## 2025-01-12 PROCEDURE — 36415 COLL VENOUS BLD VENIPUNCTURE: CPT | Performed by: NURSE PRACTITIONER

## 2025-01-12 PROCEDURE — 2500000004 HC RX 250 GENERAL PHARMACY W/ HCPCS (ALT 636 FOR OP/ED): Performed by: INTERNAL MEDICINE

## 2025-01-12 PROCEDURE — 85027 COMPLETE CBC AUTOMATED: CPT | Performed by: NURSE PRACTITIONER

## 2025-01-12 PROCEDURE — 2500000004 HC RX 250 GENERAL PHARMACY W/ HCPCS (ALT 636 FOR OP/ED): Performed by: NURSE PRACTITIONER

## 2025-01-12 PROCEDURE — 83735 ASSAY OF MAGNESIUM: CPT | Performed by: NURSE PRACTITIONER

## 2025-01-12 PROCEDURE — 2500000001 HC RX 250 WO HCPCS SELF ADMINISTERED DRUGS (ALT 637 FOR MEDICARE OP): Performed by: INTERNAL MEDICINE

## 2025-01-12 PROCEDURE — 1210000001 HC SEMI-PRIVATE ROOM DAILY

## 2025-01-12 PROCEDURE — 2500000001 HC RX 250 WO HCPCS SELF ADMINISTERED DRUGS (ALT 637 FOR MEDICARE OP): Performed by: NURSE PRACTITIONER

## 2025-01-12 PROCEDURE — 82947 ASSAY GLUCOSE BLOOD QUANT: CPT

## 2025-01-12 PROCEDURE — 80053 COMPREHEN METABOLIC PANEL: CPT | Performed by: NURSE PRACTITIONER

## 2025-01-12 PROCEDURE — 2500000002 HC RX 250 W HCPCS SELF ADMINISTERED DRUGS (ALT 637 FOR MEDICARE OP, ALT 636 FOR OP/ED): Performed by: NURSE PRACTITIONER

## 2025-01-12 PROCEDURE — 99232 SBSQ HOSP IP/OBS MODERATE 35: CPT | Performed by: INTERNAL MEDICINE

## 2025-01-12 PROCEDURE — 84100 ASSAY OF PHOSPHORUS: CPT | Performed by: NURSE PRACTITIONER

## 2025-01-12 RX ORDER — SODIUM CHLORIDE, SODIUM LACTATE, POTASSIUM CHLORIDE, CALCIUM CHLORIDE 600; 310; 30; 20 MG/100ML; MG/100ML; MG/100ML; MG/100ML
50 INJECTION, SOLUTION INTRAVENOUS CONTINUOUS
Status: ACTIVE | OUTPATIENT
Start: 2025-01-12 | End: 2025-01-13

## 2025-01-12 RX ORDER — SODIUM BICARBONATE 650 MG/1
650 TABLET ORAL 3 TIMES DAILY
Status: DISPENSED | OUTPATIENT
Start: 2025-01-12

## 2025-01-12 RX ADMIN — HEPARIN SODIUM 5000 UNITS: 5000 INJECTION INTRAVENOUS; SUBCUTANEOUS at 00:57

## 2025-01-12 RX ADMIN — CLOPIDOGREL BISULFATE 75 MG: 75 TABLET, FILM COATED ORAL at 08:58

## 2025-01-12 RX ADMIN — CILOSTAZOL 50 MG: 50 TABLET ORAL at 08:58

## 2025-01-12 RX ADMIN — CILOSTAZOL 50 MG: 50 TABLET ORAL at 21:24

## 2025-01-12 RX ADMIN — SODIUM BICARBONATE 650 MG: 650 TABLET ORAL at 21:24

## 2025-01-12 RX ADMIN — HEPARIN SODIUM 5000 UNITS: 5000 INJECTION INTRAVENOUS; SUBCUTANEOUS at 08:58

## 2025-01-12 RX ADMIN — Medication 2000 UNITS: at 08:58

## 2025-01-12 RX ADMIN — SODIUM BICARBONATE 650 MG: 650 TABLET ORAL at 16:32

## 2025-01-12 RX ADMIN — TAMSULOSIN HYDROCHLORIDE 0.4 MG: 0.4 CAPSULE ORAL at 08:58

## 2025-01-12 RX ADMIN — PANTOPRAZOLE SODIUM 40 MG: 40 TABLET, DELAYED RELEASE ORAL at 05:30

## 2025-01-12 RX ADMIN — HEPARIN SODIUM 5000 UNITS: 5000 INJECTION INTRAVENOUS; SUBCUTANEOUS at 16:32

## 2025-01-12 RX ADMIN — SODIUM CHLORIDE, POTASSIUM CHLORIDE, SODIUM LACTATE AND CALCIUM CHLORIDE 50 ML/HR: 600; 310; 30; 20 INJECTION, SOLUTION INTRAVENOUS at 16:32

## 2025-01-12 ASSESSMENT — PAIN SCALES - GENERAL
PAINLEVEL_OUTOF10: 0 - NO PAIN
PAINLEVEL_OUTOF10: 0 - NO PAIN

## 2025-01-12 ASSESSMENT — COGNITIVE AND FUNCTIONAL STATUS - GENERAL
WALKING IN HOSPITAL ROOM: A LOT
DRESSING REGULAR LOWER BODY CLOTHING: A LOT
TOILETING: A LOT
MOVING FROM LYING ON BACK TO SITTING ON SIDE OF FLAT BED WITH BEDRAILS: A LITTLE
STANDING UP FROM CHAIR USING ARMS: A LITTLE
DRESSING REGULAR UPPER BODY CLOTHING: A LITTLE
PERSONAL GROOMING: A LITTLE
EATING MEALS: A LITTLE
MOVING TO AND FROM BED TO CHAIR: A LITTLE
DAILY ACTIVITIY SCORE: 15
MOBILITY SCORE: 16
HELP NEEDED FOR BATHING: A LOT
TURNING FROM BACK TO SIDE WHILE IN FLAT BAD: A LITTLE
CLIMB 3 TO 5 STEPS WITH RAILING: A LOT

## 2025-01-12 ASSESSMENT — PAIN - FUNCTIONAL ASSESSMENT: PAIN_FUNCTIONAL_ASSESSMENT: 0-10

## 2025-01-12 ASSESSMENT — PAIN SCALES - WONG BAKER: WONGBAKER_NUMERICALRESPONSE: NO HURT

## 2025-01-12 NOTE — CARE PLAN
The patient's goals for the shift include      The clinical goals for the shift include patient will remain free from falls/inury through out shift      Problem: Safety - Adult  Goal: Free from fall injury  Outcome: Progressing     Problem: Fall/Injury  Goal: Verbalize understanding of personal risk factors for fall in the hospital  Outcome: Progressing  Goal: Use assistive devices by end of the shift  Outcome: Progressing  Goal: Pace activities to prevent fatigue by end of the shift  Outcome: Progressing     Over the shift, the patient did make progress toward the following goals. Barriers to progression include none. Recommendations to address these barriers include continue with current plan of care.

## 2025-01-12 NOTE — PROGRESS NOTES
"CT follow up :   Pt lives alone in a one story house with no stairs to enter. Has tub shower with chair and GB.    PER CHART REVIEW:  Prior Function Per Pt/Caregiver Report  Prior Function Comments: Pt ambulates with a powered scooter and WW primarily. Reports being indep with ADLs, and getting help from [facility] 3days/wk with cleaning. Needs assistance with Med management. Pt fell \"a couple times\" in the last 3 months and was unable to get himself up. Reports its bc his knees give out. Does not drive  Therapy evaluations completed.   Max A x 2 needed to decelerate Stand > sit transfer.  [facility] 10. Pt would benefit from continued therapy at skilled loc.    Printed SNF list off and provided to patient at  bedside.  Contacted pt LILLIANA Marroquin and updated that list has been left at bedside for him to look over with the patient. They request follow up with care transitions team tomorrow afternoon so they can then provide their preference for referral.  Care transitions team  will continue to follow .     " No

## 2025-01-12 NOTE — CARE PLAN
The patient's goals for the shift include  UNABLE    The clinical goals for the shift include patient will remain safe and free from injury/falls through out shift

## 2025-01-12 NOTE — PROGRESS NOTES
Medical Group Progress Note  ASSESSMENT & PLAN:     Fall  Rhabdomyolysis  LESLIE on CKDIV  Hyperkalemia  -renal function improved with IVF  -CK coming down  -cont with IVF until expires; oral intake ok will cont with oral hydration thereafter  -nephro following  -PT/OT - likely dispo is SNF    Elevated LFTs  -improving, likely 2/2 rhabdo    Hypotension  -improved with IVF  -hold antihypertensives    Chronic systolic CHF  VT  PAD  HTN  HLD  -stable, cont home medications    DM2  -cont correctional insulin scale    Malnutrition Diagnosis Status: New  Malnutrition Diagnosis: Moderate malnutrition related to chronic disease or condition  As Evidenced by: >5% wt loss in the past month, moderate muscle wasting, moderate fat loss, suspect pt may be eating <75% of estimated energy needs given pt lives alone with hx dementia.  I agree with the dietitian's malnutrition diagnosis.    VTE Prophylaxis: heparin    1/12/25:  Cr continues to improve  K is stable  Cont with IVF for now  CK improved  Cont PT/OT, looking towards SNF due to recurrent falls at home      Chon Arthur MD    SUBJECTIVE     No overnight events. Feeling well today. No pain. No chest pain or dyspnea. Tolerating diet.    OBJECTIVE:     Last Recorded Vitals:  Vitals:    01/11/25 0600 01/11/25 0738 01/12/25 0542 01/12/25 0903   BP:  117/65  94/57   BP Location:    Left arm   Patient Position:    Lying   Pulse:  81  81   Resp:    18   Temp:  36.4 °C (97.5 °F)  36.5 °C (97.7 °F)   TempSrc:    Temporal   SpO2:  96%  97%   Weight: 80.7 kg (177 lb 14.6 oz)  82.5 kg (181 lb 14.1 oz)    Height:         Last I/O:  I/O last 3 completed shifts:  In: 3716 (45 mL/kg) [I.V.:3716 (45 mL/kg)]  Out: 1950 (23.6 mL/kg) [Urine:1950 (0.7 mL/kg/hr)]  Weight: 82.5 kg     Physical Exam  GEN: healthy appearing, appears stated age, NAD  HEENT: NCAT, PERRLA, EOMI, moist mucous membranes  NECK: supple, no masses  CV: RRR, no m/r/g, no LE edema  LUNGS: CTAB, no w/r/c  ABD: soft, NT,  ND, NBS  SKIN: no rashes  MSK; no gross deformities, normal joints  NEURO: A+Ox3, no FND  PSYCH: appropriate mood, affect      Inpatient Medications:  cholecalciferol, 2,000 Units, oral, Daily  cilostazol, 50 mg, oral, BID  [Held by provider] citalopram, 20 mg, oral, Daily  clopidogrel, 75 mg, oral, Daily  [Transfer Hold] epoetin mychal or biosimilar, 100 Units/kg, subcutaneous, q7 days  heparin (porcine), 5,000 Units, subcutaneous, q8h  insulin lispro, 0-5 Units, subcutaneous, Before meals & nightly  levothyroxine, 50 mcg, oral, Once per day on Monday Tuesday Wednesday Thursday Friday Saturday  pantoprazole, 40 mg, oral, Daily before breakfast  sodium bicarbonate, 650 mg, oral, TID  tamsulosin, 0.4 mg, oral, Daily    PRN Medications  PRN medications: dextrose, dextrose, glucagon, glucagon  Continuous Medications:       LABS AND IMAGING:     Labs:  Results from last 7 days   Lab Units 01/12/25  0713 01/11/25  0657 01/10/25  0403   WBC AUTO x10*3/uL 3.8* 3.6* 5.5   RBC AUTO x10*6/uL 2.79* 2.67* 2.79*   HEMOGLOBIN g/dL 8.8* 8.4* 8.8*   HEMATOCRIT % 27.3* 25.5* 26.0*   MCV fL 98 96 93   MCH pg 31.5 31.5 31.5   MCHC g/dL 32.2 32.9 33.8   RDW % 15.1* 14.7* 14.7*   PLATELETS AUTO x10*3/uL 129* 130* 149*     Results from last 7 days   Lab Units 01/12/25  0713 01/11/25  0657 01/10/25  0403   SODIUM mmol/L 133* 134* 137   POTASSIUM mmol/L 4.7 4.3 5.2   CHLORIDE mmol/L 114* 111* 110*   CO2 mmol/L 16* 17* 17*   BUN mg/dL 59* 66* 72*   CREATININE mg/dL 3.02* 3.71* 4.84*   GLUCOSE mg/dL 99 97 95   PROTEIN TOTAL g/dL 4.4* 4.4* 4.9*   CALCIUM mg/dL 7.7* 7.7* 8.1*   BILIRUBIN TOTAL mg/dL 0.7 0.7 0.8   ALK PHOS U/L 97 97 108   AST U/L 65* 90* 120*   ALT U/L 26 30 30     Results from last 7 days   Lab Units 01/12/25  0713 01/11/25  0657 01/10/25  0403   MAGNESIUM mg/dL 1.80 1.95 2.13     Results from last 7 days   Lab Units 01/09/25  1420 01/09/25  1242   TROPHS ng/L 80* 101*     Imaging:  CT chest abdomen pelvis wo IV contrast, CT  thoracic spine wo IV contrast, CT lumbar spine wo IV contrast  Narrative: Interpreted By:  Joni Burden,   STUDY:  CT CHEST ABDOMEN PELVIS WO CONTRAST; CT THORACIC SPINE WO IV  CONTRAST; CT LUMBAR SPINE WO IV CONTRAST;  1/9/2025 3:35 pm; 1/9/2025  3:36 pm      INDICATION:  Signs/Symptoms:fall, LESLIE, rhabdo; Signs/Symptoms:fall.          COMPARISON:  Frontal pelvis and portable chest from earlier same day 9 January 2025; CT abdomen and pelvis without contrast 19 July 2024 and several  others back through the oldest available, 26 June 2006      ACCESSION NUMBER(S):  [id number]; [id number]; [id number]      ORDERING CLINICIAN:  MARY DANG      TECHNIQUE:  CT chest, abdomen and pelvis without intravenous or oral contrast      CT thoracic spine without IV contrast      CT lumbar spine without IV contrast      FINDINGS:  CT CHEST      LUNGS / AIRSPACES / AIRWAYS:      LARGE AIRWAYS  Filling defect: Single thin web-like secretion in the right mainstem  bronchus and bronchus intermedius. No polypoid or otherwise masslike  filling defect Wall thickening: Negative  Bronchiectasis: Negative  Other: N/A      AIRSPACES  Fibrosis: Negative  Emphysema: Negative  Consolidation: Negative  Ground glass airspace disease: Negative  Edema: Negative  Nodule / Mass: Negative  Other: N/A      PLEURA:  Effusion: Both sides negative  Pneumothorax: Both sides negative  Other: n/a      CARDIOVASCULAR:  Heart size: Within normal limits  Pericardial effusion: Negative  Thoracic aortic aneurysm: Negative  Pulmonary arteries: No ectasia  Heart failure change: Negative.  No sign of interstitial or alveolar  edema. Other: n/a      NONVASCULAR MEDIASTINUM:  Esophagus: Grossly normal by CT  Mediastinal Mass: Negative  Hiatal hernia: Large compound type, including majority of the stomach  as well as nonobstructive length of mid transverse colon. On CT 19 July 2024, none of the colon within the hernia Other: n/a      LYMPH NODES:  No  thoracic adenopathy      CHEST WALL:  Soft tissues of the chest wall are unremarkable      SKELETON:  No acute findings, traumatic or otherwise, of bones on CT thoracic  spine and all of the other bones included on complete CT chest,  abdomen and pelvis not included on the spine CT      Exaggerated thoracic kyphosis. Findings of diffuse idiopathic  skeletal hyperostosis in the mid and lower thoracic spine      -------      CT ABDOMEN AND PELVIS      LIVER: Lobular but not nodular or fatty. Not enlarged. No obvious  focal abnormality on this exam without contrast      SPLEEN: Normal. No enlargement.      PANCREAS: Normal. No CT evidence of acute or chronic pancreatitis. No  obvious  duct dilation. No gross evidence of a mass, noting low  sensitivity and specificity without IV contrast.      GALLBLADDER: Normal CT appearance. No dilation, calcified, or  gas-containing stones.  Other types of gallstones could be occult on  CT and detectable only by ultrasound.      BILE DUCTS: Normal. No biliary duct dilation.      ADRENAL GLANDS: Normal. No nodule or mass.      KIDNEYS AND URETERS: Left kidney absent. Large but simple right renal  cyst. Few smaller right renal lesions that do not qualify as simple  cysts, technically indeterminate but unchanged. No hydronephrosis or  stone or any other acute finding      LYMPH NODES: No adenopathy, intraperitoneal, retroperitoneal, pelvic,  inguinal or otherwise.      APPENDIX: Normal.  Not dilated, thick walled or in any other way  inflamed in appearance.  No inflammatory change about the appendix.      COLON: Normal. No sign of acute diverticulitis or other colitis. No  annular constricting mass.      SMALL BOWEL: Normal. No small bowel dilation or any other sign of  small bowel obstruction.      STOMACH / DUODENUM: Grossly normal by CT which has limited  sensitivity and specificity for the stomach and duodenum.      RETROPERITONEUM: Normal.  No acute hemorrhage or inflammatory  change.  Lymph nodes in a separate dedicated section.      OMENTUM, MESENTERY AND PERITONEAL SPACES:  Free intraperitoneal air: Negative  Free intraperitoneal fluid: Negative  Abscess: Negative  Other: Rim calcified soft tissue attenuation body in the  retroperitoneal fat left lower quadrant was all the way back on CT  from 15 July 2010 when the CT appearance was diagnostic of fat  necrosis. It has since calcified and otherwise evolved but is not an  acute finding or finding requiring follow-up      URINARY BLADDER: Normal. No wall thickening, large diverticula,  radiodense stone or surrounding inflammatory change.      PELVIS: Inguinal canal abnormalities probably reflect hydroceles and  portions of the testes      VASCULATURE: Unenhanced CT appearance within normal limits. No  abdominal aortic aneurysm or other acute finding.      ABDOMINAL WALL:  Hernia: Negative  Other: No acute or contributory abnormality.      SKELETON:  No acute findings, traumatic or otherwise, of bones on CT lumbar  spine and all of the other bones included on complete CT chest,  abdomen and pelvis not included on the spine CT      Minimal retrolisthesis of L2 on L3. Alignment otherwise anatomic. No  pars defects. Degenerative changes most pronounced at L2-3 and L5-S1      Impression: No acute findings in the chest, abdomen or pelvis, traumatic or  otherwise, including all included bones (of both spine CTs and all of  the other bones included on complete CT chest, abdomen pelvis that  were not included on the spine CTs)      On CT 19 July 2024, only the majority of the stomach was above the  diaphragm in this patient with hiatal hernia; today, same length of  nearly the entire stomach, but also a substantial length of  nonobstructed mid transverse colon is now in the hernia, making it a  compound (type 4) hiatal hernia. No obstruction associated with it      No other potentially unexpected contributory new abnormalities in the  chest; none  at all in the abdomen or pelvis      This report serves as the diagnostic interpretation for three exams  performed concurrently: CT chest, abdomen and pelvis without  contrast; CT thoracic spine without contrast; CT lumbar spine without  contrast      MACRO:  None      Signed by: Joni Noriegakezia 1/9/2025 3:54 PM  Dictation workstation:   [id number]  ECG 12 lead  Sinus rhythm with 1st degree AV block  Low voltage QRS  Cannot rule out Anterior infarct (cited on or before 19-JUL-2024)  T wave abnormality, consider inferior ischemia  Abnormal ECG  When compared with ECG of 09-JAN-2025 12:40, (unconfirmed)  Previous ECG has undetermined rhythm, needs review  Questionable change in initial forces of Septal leads  XR pelvis 1-2 views  Narrative: STUDY:  Pelvis Radiographs; 1/9/2025 1:04 PM.  INDICATION:  Trauma.  COMPARISON:  CT abdomen pelvis 7/19/2024.  ACCESSION NUMBER(S):  [id number]  ORDERING CLINICIAN:  PERLITA VALDEZ  TECHNIQUE:  One view of the pelvis.  FINDINGS:    The pelvic ring is intact.  There is no acute fracture.    Impression: Unremarkable pelvis.  Signed by Jose Connor MD  XR chest 1 view  Narrative: STUDY:  Chest Radiograph;  1/9/2025 1:04PM  INDICATION:  Trauma.  COMPARISON:  XR Chest: 7/19/2024.  ACCESSION NUMBER(S):  [id number]  ORDERING CLINICIAN:  PERLITA VALDEZ  TECHNIQUE:  Frontal chest was obtained at 13:03 hours.  FINDINGS:  Loop recorder redemonstrated.  Cardiac silhouette normal in size.  No significant pulmonary vascular congestion.   No significant pleural effusion.   No visible pneumothorax.   Impression: No acute radiographic chest finding.  Signed by Adalberto To MD  CT head W O contrast trauma protocol, CT cervical spine wo IV contrast  Narrative: Interpreted By:  Jay Malone,   STUDY:  CT HEAD W/O CONTRAST TRAUMA PROTOCOL; CT CERVICAL SPINE WO IV  CONTRAST;  1/9/2025 12:51 pm      INDICATION:  Signs/Symptoms:Trauma.      COMPARISON:  CT head 03/06/2023      ACCESSION  NUMBER(S):  [id number]; [id number]      ORDERING CLINICIAN:  PERLITA VALDEZ      TECHNIQUE:  CT scan of the head and cervical spine was performed without  intravenous contrast.      FINDINGS:  CT HEAD:      Parenchyma: Right frontal encephalomalacia related to remote infarct  is unchanged. No evidence of acute large vessel territory infarct. No  intracranial hemorrhage. No mass effect or midline shift. Scattered  periventricular white matter hypodensities, likely chronic  microvascular ischemic change. Moderate parenchymal atrophy.      CSF Spaces: The ventricles, sulci and basal cisterns are proportional  to the degree of parenchymal volume loss.      Extra-Axial Fluid: None.      Calvarium: No acute depressed fracture.      Paranasal sinuses: Clear.      Mastoids: Partial opacification of air cells on the left.      Orbits: No acute abnormality.      Soft tissues: No scalp hematoma.          CT CERVICAL SPINE:      Prevertebral/Paraspinal Soft Tissues: No acute abnormalities.      Hardware: None.  Fracture: No acute fracture.  Vertebral Body Heights: Maintained.  Alignment: No traumatic listhesis. No facet subluxation.  Spinal canal and neural foramina: Multilevel degenerative disc  disease, most pronounced at C4-C5 and C5-C6 with severe bilateral  neural foraminal narrowing. No high-grade canal stenosis.      Impression: No acute intracranial hemorrhage or depressed calvarial fracture.      No acute fracture or traumatic malalignment of the cervical spine.      MACRO  None      Signed by: Jay Malone 1/9/2025 1:05 PM  Dictation workstation:   [id number]

## 2025-01-12 NOTE — PROGRESS NOTES
Renal Progress Note  Assessment:  89 y.o. male with history s/f dementia, HTN, HLD, CAD s/p PATY to LAD/LCX (2013), HFpEF, VT, pHTN, SND, loop recorder, T2DM, hypothyroidism, GERD, CKD stage III/IV, BPH, urethral cancer s/p LT nephrectomy, anxiety/MDD and frequent falls who presented for a fall.     LESLIE on CKD stage III/IV: has RT solitary kidney, multifactorial, most likely in setting of hypotension and rhabdomyolysis +/- volume depletion, on lasix as outpatient, states PO is ok but patient lives alone, CKD in setting of LT nephrectomy, T2DM, HTN, CHF, UA w/ bact, hematuria, hyaline casts, no proteinuria   Hypotension  Frequent falls   Metabolic acidosis: 2/2 renal failure   Hyperkalemia: in setting of LESLIE, rhabdomyolysis and outpatient K supplementation  Rhabdomyolysis   Hypoalbuminemia  Anemia of renal disease      Plan:  -Continue to have no indication for RRT at this time   - since K improving no need to change IVFs from LR at this time  - trending K, ok to stop lokelma after next dose   - retacrit for anemia   - checking bone mineral labs reviewed vitamin D deficiency of 9 and PTH intact of 166 patient corrected calcium 8.7.  Vitamin D deficiency will start vitamin D  Will start bicarb       Subjective:   Admit Date: 1/9/2025    Interval History: Patient seen and examined uneventful night more alert follow command in no apparent pain or distress      Medications:   Scheduled Meds:cholecalciferol, 2,000 Units, oral, Daily  cilostazol, 50 mg, oral, BID  [Held by provider] citalopram, 20 mg, oral, Daily  clopidogrel, 75 mg, oral, Daily  [Transfer Hold] epoetin mychal or biosimilar, 100 Units/kg, subcutaneous, q7 days  heparin (porcine), 5,000 Units, subcutaneous, q8h  insulin lispro, 0-5 Units, subcutaneous, Before meals & nightly  levothyroxine, 50 mcg, oral, Once per day on Monday Tuesday Wednesday Thursday Friday Saturday  pantoprazole, 40 mg, oral, Daily before breakfast  tamsulosin, 0.4 mg, oral,  "Daily      Continuous Infusions:     CBC:   Lab Results   Component Value Date    HGB 8.8 (L) 01/12/2025    HGB 8.4 (L) 01/11/2025    WBC 3.8 (L) 01/12/2025    WBC 3.6 (L) 01/11/2025     (L) 01/12/2025     (L) 01/11/2025      Anemia:  No results found for: \"FERRITIN\", \"IRON\", \"TIBC\"   BMP:    Lab Results   Component Value Date     (L) 01/12/2025     (L) 01/11/2025    K 4.7 01/12/2025    K 4.3 01/11/2025     (H) 01/12/2025     (H) 01/11/2025    CO2 16 (L) 01/12/2025    CO2 17 (L) 01/11/2025    BUN 59 (H) 01/12/2025    BUN 66 (H) 01/11/2025    CREATININE 3.02 (H) 01/12/2025    CREATININE 3.71 (H) 01/11/2025      Bone disease:   Lab Results   Component Value Date    PHOS 2.4 (L) 01/12/2025    .3 (H) 01/11/2025    VITD25 9 (L) 01/11/2025      Urinalysis:    Lab Results   Component Value Date    KUN 5.0 01/09/2025    PROTUR 20 (TRACE) 01/09/2025    GLUCOSEU Normal 01/09/2025    BLOODU 1.0 (3+) (A) 01/09/2025    KETONESU NEGATIVE 01/09/2025    BILIRUBINU NEGATIVE 01/09/2025    NITRITEU NEGATIVE 01/09/2025    LEUKOCYTESU NEGATIVE 01/09/2025        Objective:   Vitals: BP 94/57 (BP Location: Left arm, Patient Position: Lying)   Pulse 81   Temp 36.5 °C (97.7 °F) (Temporal)   Resp 18   Ht 1.727 m (5' 7.99\")   Wt 82.5 kg (181 lb 14.1 oz)   SpO2 97%   BMI 27.66 kg/m²    Wt Readings from Last 3 Encounters:   01/12/25 82.5 kg (181 lb 14.1 oz)   12/03/24 80 kg (176 lb 6.4 oz)   11/26/24 81.2 kg (179 lb)      24HR INTAKE/OUTPUT:    Intake/Output Summary (Last 24 hours) at 1/12/2025 1512  Last data filed at 1/12/2025 0542  Gross per 24 hour   Intake 1566 ml   Output 1000 ml   Net 566 ml     Admission weight:  Weight: 79.4 kg (175 lb)      Constitutional:  Alert, awake, no apparent distress   Skin:normal, no rash  HEENT:sclera anicteric.  Head atraumatic normocephalic  Neck:supple with no thyromegally  Cardiovascular:  S1, S2 without m/r/g   Respiratory:  CTA B without w/r/r "   Abdomen: +bs, soft, nt  Ext: no LE edema  Musculoskeletal:Intact  Neuro:Alert and oriented with no deficit      Electronically signed by Mayank Young MD on 1/12/2025 at 3:12 PM

## 2025-01-13 VITALS
HEIGHT: 68 IN | RESPIRATION RATE: 18 BRPM | HEART RATE: 84 BPM | BODY MASS INDEX: 27.57 KG/M2 | TEMPERATURE: 98.4 F | WEIGHT: 181.88 LBS | DIASTOLIC BLOOD PRESSURE: 55 MMHG | SYSTOLIC BLOOD PRESSURE: 102 MMHG | OXYGEN SATURATION: 96 %

## 2025-01-13 VITALS
WEIGHT: 181.88 LBS | SYSTOLIC BLOOD PRESSURE: 100 MMHG | DIASTOLIC BLOOD PRESSURE: 47 MMHG | HEIGHT: 68 IN | TEMPERATURE: 99.9 F | RESPIRATION RATE: 18 BRPM | OXYGEN SATURATION: 92 % | BODY MASS INDEX: 27.57 KG/M2 | HEART RATE: 86 BPM

## 2025-01-13 LAB
ALBUMIN SERPL BCP-MCNC: 2.6 G/DL (ref 3.4–5)
ALP SERPL-CCNC: 94 U/L (ref 33–136)
ALT SERPL W P-5'-P-CCNC: 25 U/L (ref 10–52)
ANION GAP SERPL CALC-SCNC: 8 MMOL/L (ref 10–20)
AST SERPL W P-5'-P-CCNC: 45 U/L (ref 9–39)
BACTERIA BLD CULT: NORMAL
BACTERIA BLD CULT: NORMAL
BILIRUB SERPL-MCNC: 0.7 MG/DL (ref 0–1.2)
BUN SERPL-MCNC: 60 MG/DL (ref 6–23)
CALCIUM SERPL-MCNC: 7.8 MG/DL (ref 8.6–10.3)
CHLORIDE SERPL-SCNC: 114 MMOL/L (ref 98–107)
CO2 SERPL-SCNC: 18 MMOL/L (ref 21–32)
CREAT SERPL-MCNC: 2.54 MG/DL (ref 0.5–1.3)
EGFRCR SERPLBLD CKD-EPI 2021: 23 ML/MIN/1.73M*2
ERYTHROCYTE [DISTWIDTH] IN BLOOD BY AUTOMATED COUNT: 15.3 % (ref 11.5–14.5)
GLUCOSE BLD MANUAL STRIP-MCNC: 104 MG/DL (ref 74–99)
GLUCOSE BLD MANUAL STRIP-MCNC: 104 MG/DL (ref 74–99)
GLUCOSE BLD MANUAL STRIP-MCNC: 120 MG/DL (ref 74–99)
GLUCOSE BLD MANUAL STRIP-MCNC: 130 MG/DL (ref 74–99)
GLUCOSE SERPL-MCNC: 99 MG/DL (ref 74–99)
HCT VFR BLD AUTO: 26 % (ref 41–52)
HGB BLD-MCNC: 8.5 G/DL (ref 13.5–17.5)
MAGNESIUM SERPL-MCNC: 1.75 MG/DL (ref 1.6–2.4)
MCH RBC QN AUTO: 31.3 PG (ref 26–34)
MCHC RBC AUTO-ENTMCNC: 32.7 G/DL (ref 32–36)
MCV RBC AUTO: 96 FL (ref 80–100)
NRBC BLD-RTO: 0 /100 WBCS (ref 0–0)
PHOSPHATE SERPL-MCNC: 1.8 MG/DL (ref 2.5–4.9)
PLATELET # BLD AUTO: 132 X10*3/UL (ref 150–450)
POTASSIUM SERPL-SCNC: 4.7 MMOL/L (ref 3.5–5.3)
PROT SERPL-MCNC: 4.5 G/DL (ref 6.4–8.2)
RBC # BLD AUTO: 2.72 X10*6/UL (ref 4.5–5.9)
SODIUM SERPL-SCNC: 135 MMOL/L (ref 136–145)
WBC # BLD AUTO: 4 X10*3/UL (ref 4.4–11.3)

## 2025-01-13 PROCEDURE — 80053 COMPREHEN METABOLIC PANEL: CPT | Performed by: NURSE PRACTITIONER

## 2025-01-13 PROCEDURE — 82947 ASSAY GLUCOSE BLOOD QUANT: CPT

## 2025-01-13 PROCEDURE — 36415 COLL VENOUS BLD VENIPUNCTURE: CPT | Performed by: NURSE PRACTITIONER

## 2025-01-13 PROCEDURE — 2500000005 HC RX 250 GENERAL PHARMACY W/O HCPCS: Performed by: INTERNAL MEDICINE

## 2025-01-13 PROCEDURE — 2500000004 HC RX 250 GENERAL PHARMACY W/ HCPCS (ALT 636 FOR OP/ED): Performed by: NURSE PRACTITIONER

## 2025-01-13 PROCEDURE — 97165 OT EVAL LOW COMPLEX 30 MIN: CPT | Mod: GO

## 2025-01-13 PROCEDURE — 2500000001 HC RX 250 WO HCPCS SELF ADMINISTERED DRUGS (ALT 637 FOR MEDICARE OP): Performed by: INTERNAL MEDICINE

## 2025-01-13 PROCEDURE — 2500000004 HC RX 250 GENERAL PHARMACY W/ HCPCS (ALT 636 FOR OP/ED): Performed by: INTERNAL MEDICINE

## 2025-01-13 PROCEDURE — 1210000001 HC SEMI-PRIVATE ROOM DAILY

## 2025-01-13 PROCEDURE — 92526 ORAL FUNCTION THERAPY: CPT | Mod: GN

## 2025-01-13 PROCEDURE — 84100 ASSAY OF PHOSPHORUS: CPT | Performed by: NURSE PRACTITIONER

## 2025-01-13 PROCEDURE — 99239 HOSP IP/OBS DSCHRG MGMT >30: CPT | Performed by: INTERNAL MEDICINE

## 2025-01-13 PROCEDURE — 85027 COMPLETE CBC AUTOMATED: CPT | Performed by: NURSE PRACTITIONER

## 2025-01-13 PROCEDURE — 83735 ASSAY OF MAGNESIUM: CPT | Performed by: NURSE PRACTITIONER

## 2025-01-13 PROCEDURE — 2500000001 HC RX 250 WO HCPCS SELF ADMINISTERED DRUGS (ALT 637 FOR MEDICARE OP): Performed by: NURSE PRACTITIONER

## 2025-01-13 PROCEDURE — 2500000002 HC RX 250 W HCPCS SELF ADMINISTERED DRUGS (ALT 637 FOR MEDICARE OP, ALT 636 FOR OP/ED): Performed by: NURSE PRACTITIONER

## 2025-01-13 PROCEDURE — 97110 THERAPEUTIC EXERCISES: CPT | Mod: GP

## 2025-01-13 RX ORDER — SODIUM BICARBONATE 650 MG/1
650 TABLET ORAL 3 TIMES DAILY
Start: 2025-01-13 | End: 2025-01-20

## 2025-01-13 RX ADMIN — HEPARIN SODIUM 5000 UNITS: 5000 INJECTION INTRAVENOUS; SUBCUTANEOUS at 08:45

## 2025-01-13 RX ADMIN — POTASSIUM PHOSPHATE, MONOBASIC AND POTASSIUM PHOSPHATE, DIBASIC 21 MMOL: 224; 236 INJECTION, SOLUTION, CONCENTRATE INTRAVENOUS at 11:25

## 2025-01-13 RX ADMIN — CILOSTAZOL 50 MG: 50 TABLET ORAL at 08:45

## 2025-01-13 RX ADMIN — CLOPIDOGREL BISULFATE 75 MG: 75 TABLET, FILM COATED ORAL at 08:45

## 2025-01-13 RX ADMIN — CILOSTAZOL 50 MG: 50 TABLET ORAL at 22:44

## 2025-01-13 RX ADMIN — SODIUM BICARBONATE 650 MG: 650 TABLET ORAL at 14:39

## 2025-01-13 RX ADMIN — LEVOTHYROXINE SODIUM 50 MCG: 0.05 TABLET ORAL at 06:25

## 2025-01-13 RX ADMIN — HEPARIN SODIUM 5000 UNITS: 5000 INJECTION INTRAVENOUS; SUBCUTANEOUS at 00:52

## 2025-01-13 RX ADMIN — TAMSULOSIN HYDROCHLORIDE 0.4 MG: 0.4 CAPSULE ORAL at 08:45

## 2025-01-13 RX ADMIN — PANTOPRAZOLE SODIUM 40 MG: 40 TABLET, DELAYED RELEASE ORAL at 06:25

## 2025-01-13 RX ADMIN — Medication 2000 UNITS: at 08:45

## 2025-01-13 RX ADMIN — SODIUM BICARBONATE 650 MG: 650 TABLET ORAL at 08:45

## 2025-01-13 RX ADMIN — SODIUM BICARBONATE 650 MG: 650 TABLET ORAL at 22:44

## 2025-01-13 RX ADMIN — HEPARIN SODIUM 5000 UNITS: 5000 INJECTION INTRAVENOUS; SUBCUTANEOUS at 16:43

## 2025-01-13 RX ADMIN — SODIUM CHLORIDE, POTASSIUM CHLORIDE, SODIUM LACTATE AND CALCIUM CHLORIDE 50 ML/HR: 600; 310; 30; 20 INJECTION, SOLUTION INTRAVENOUS at 08:45

## 2025-01-13 RX ADMIN — HEPARIN SODIUM 5000 UNITS: 5000 INJECTION INTRAVENOUS; SUBCUTANEOUS at 23:37

## 2025-01-13 ASSESSMENT — COGNITIVE AND FUNCTIONAL STATUS - GENERAL
DRESSING REGULAR LOWER BODY CLOTHING: TOTAL
TOILETING: A LITTLE
WALKING IN HOSPITAL ROOM: A LOT
TURNING FROM BACK TO SIDE WHILE IN FLAT BAD: A LOT
DRESSING REGULAR UPPER BODY CLOTHING: A LITTLE
DRESSING REGULAR UPPER BODY CLOTHING: A LITTLE
PERSONAL GROOMING: A LITTLE
EATING MEALS: A LITTLE
HELP NEEDED FOR BATHING: A LITTLE
TOILETING: TOTAL
DAILY ACTIVITIY SCORE: 18
MOBILITY SCORE: 11
MOVING TO AND FROM BED TO CHAIR: A LOT
CLIMB 3 TO 5 STEPS WITH RAILING: TOTAL
CLIMB 3 TO 5 STEPS WITH RAILING: TOTAL
HELP NEEDED FOR BATHING: TOTAL
MOVING FROM LYING ON BACK TO SITTING ON SIDE OF FLAT BED WITH BEDRAILS: A LOT
PERSONAL GROOMING: A LITTLE
WALKING IN HOSPITAL ROOM: TOTAL
DRESSING REGULAR LOWER BODY CLOTHING: A LITTLE
MOBILITY SCORE: 10
TURNING FROM BACK TO SIDE WHILE IN FLAT BAD: A LOT
STANDING UP FROM CHAIR USING ARMS: A LOT
DAILY ACTIVITIY SCORE: 12
MOVING TO AND FROM BED TO CHAIR: A LOT
EATING MEALS: A LITTLE
STANDING UP FROM CHAIR USING ARMS: A LOT
MOVING FROM LYING ON BACK TO SITTING ON SIDE OF FLAT BED WITH BEDRAILS: A LOT

## 2025-01-13 ASSESSMENT — PAIN - FUNCTIONAL ASSESSMENT
PAIN_FUNCTIONAL_ASSESSMENT: 0-10

## 2025-01-13 ASSESSMENT — PAIN SCALES - GENERAL
PAINLEVEL_OUTOF10: 0 - NO PAIN

## 2025-01-13 ASSESSMENT — ACTIVITIES OF DAILY LIVING (ADL): BATHING_ASSISTANCE: TOTAL

## 2025-01-13 NOTE — DISCHARGE INSTRUCTIONS
Repeat BMP Friday this week   Call office for nausea, vomiting, fever, abdominal pain and drainage from surgical sites. If you develop shortness of breath, difficulty breathing, chest pain, calf pain with swelling visit your closest emergency room Call office for nausea, vomiting, fever, abdominal pain and drainage from surgical sites. If you develop shortness of breath, difficulty breathing, chest pain, calf pain with swelling visit your closest emergency room. Additional instructions as explained and outlined on the gastric sleeve instructions.

## 2025-01-13 NOTE — PROGRESS NOTES
Inpatient Speech Language Pathology Treatment Note     Patient Name: Cb Driver  MRN: [id number]  : 1931  Today's Date: 25  Time Calculation  Start Time: 1150  Stop Time: 1215  Time Calculation (min): 25 min     Room:[address] [place] [address]    Assessment::     Attempted to see patient this a.m. at breakfast however he was already done with his breakfast and had eaten 100% of the food and drink the liquids on his tray.  Returned at lunchtime to see patient.  Patient's family at bedside visiting.  Patient was sitting upright feeding himself his easy to chew food with thin liquids.  Had chicken pocketed in right cheek for an extended period of time.  Taking sips of liquid did not wash pocketed food out of right side of patient's mouth.  Patient was cued to use a tongue sweep to clear the chicken from the right side of his mouth.  Tongue sweep was effective at removing pocketed chicken from right cheek.  Patient's son stated that he had chicken noodle soup over the weekend and that he held the chicken from the chicken noodle soup in the right side of his cheek as well.  He stated that he would cue his father to tongue sweep during meals.  He also stated that his father has lost approximately 10 pounds over the past several months.  Patient was able to drink all of and Ensure Plus that was sent on his tray.  Recommended that family provide supplements to patient between meals instead of during his meals.  Patient's son stated that he was refusing to eat his meals at home because he was filling up on Ensure supplements.  Suggested that they give patient supplements at 10:00, 2:00, and 7:00 to space out his nutritional intake and not replace his meals with supplements.  Will notify dietitian that family had questions regarding use of supplements with patient.  Family reported that patient will be discharged to [facility] at discharge.  Unsure of possible discharge date.  Nursing reported that the  transitional care coordinator is working on a precertification for the [facility].  Recommending that patient continue to receive speech therapy while at hospital for continued reinforcement of use of compensatory swallowing strategies including a tongue sweep of cheek to remove pocketed food.  Continue with plan.    Plan:     Skilled speech therapy for dysphagia treatment continues to be warranted to provide training and instruction regarding the use of compensatory swallow strategies, for pt/caregiver education in order to reduce risk of aspiration, dehydration and malnutrition. , to assess tolerance of diet , to determine ability to upgrade diet after PO trials with SLP  SLP TX Plan: Continue Plan of Care  SLP Frequency: 3x per week  Discussed POC: Patient, Caregiver/family  Discussed Risks/Benefits: Patient, Caregiver/Family  Patient/Caregiver Agreeable: Yes       Pain:     Pain Assessment  Pain Assessment: 0-10  0-10 (Numeric) Pain Score: 0 - No pain       Subjective:     Pt. seen at bedside for skilled dysphagia treatment.   Prior to Session Communication: Bedside nurse        Oxygen status:     nasal cannula     RECOMMENDATIONS:     Solid Diet Recommendations: Easy to Chew (IDDSI Level 7)  Liquid Diet Recommendations: Thin (IDDSI Level 0)  Compensatory strategies: small bites/sips, alternate bites/sips, upright 90 degrees for intake   tongue sweep of cheek to remove pocketed solid foods.  Medication administration:   Whole with thin liquids       Goals:   1.  Patient will tolerate easy to chew with thin liquids without overt signs symptoms aspiration or pulmonary compromise.  Start Date: 01/10/25  Progress: Patient had 1 episode of coughing during dysphagia treatment session however did not appear to be related to swallowing.  Status: Continue goal     2.  Patient will use compensatory swallowing strategies including sitting upright while eating, alternating bites and sips to clear oral  residuals and decreased rate of intake to reduce his risk of aspiration.  Start Date: 01/10/25  Progress: Patient required cueing during his lunch today to use compensatory swallowing strategies.  Status: Continue goal    3.  Patient will be able to complete a tongue sweep technique to his cheeks to remove pocketed food periodically throughout his meal and at the end of his meal to reduce his risk of aspiration.  Start date: 01/13/2025  Progress: Patient required cueing to use a tongue sweep to clear pocketed chicken from right cheek.  The strategy was effective at clearing pocketed chicken out of right cheek.  Status: Continue goal    Education:     Pt. given skilled instruction on use of tongue sweep technique to clear pocketed food from right cheek.  He was also reinforced to use alternating between bites and sips and decreased rate of intake to reduce his risk of aspiration.  Pt. was unable to demonstrate understanding, needs further instruction     Patient's family did acknowledge use of compensatory swallowing techniques and addition of tongue sweep technique to clear pocketed food.

## 2025-01-13 NOTE — DISCHARGE SUMMARY
Discharge Diagnosis  Fall, initial encounter  LESLIE on CKD  Rhabdomyolysis  Hyperkalemia  Hypotension  Chronic systolic CHF    Issues Requiring Follow-Up  Nephrology  Repeat BMP later this week  PCP    Discharge Meds     Medication List      START taking these medications     sodium bicarbonate 650 mg tablet; Take 1 tablet (650 mg) by mouth 3   times a day for 7 days.     CONTINUE taking these medications     atorvastatin 40 mg tablet; Commonly known as: Lipitor; TAKE ONE TABLET   BY MOUTH AT BEDTIME   cilostazol 50 mg tablet; Commonly known as: Pletal; TAKE ONE TABLET BY   MOUTH TWICE DAILY   citalopram 20 mg tablet; Commonly known as: CeleXA; TAKE ONE TABLET BY   MOUTH DAILY   clopidogrel 75 mg tablet; Commonly known as: Plavix; TAKE ONE TABLET BY   MOUTH DAILY   diclofenac sodium 1 % gel; Commonly known as: Voltaren; APPLY ONE   APPLICATION TOPICALLY FOUR TIMES A DAY AS NEEDED (RIGHT KNEE PAIN).   esomeprazole 40 mg DR capsule; Commonly known as: NexIUM; TAKE ONE   CAPSULE BY MOUTH DAILY AS DIRECTED   furosemide 40 mg tablet; Commonly known as: Lasix; Take 1 tablet (40 mg)   by mouth once daily.   levothyroxine 50 mcg tablet; Commonly known as: Synthroid, Levoxyl; TAKE   ONE TABLET (50 MCG) BY MOUTH DAILY SIX DAYS PER WEEK   oxybutynin XL 10 mg 24 hr tablet; Commonly known as: Ditropan-XL; TAKE   ONE TABLET BY MOUTH DAILY   potassium chloride ER 8 mEq ER capsule; Commonly known as: Micro-K; TAKE   ONE CAPSULE BY MOUTH DAILY   tamsulosin 0.4 mg 24 hr capsule; Commonly known as: Flomax; TAKE ONE   CAPSULE BY MOUTH DAILY       Test Results Pending At Discharge  Pending Labs       Order Current Status    Blood Culture Preliminary result    Blood Culture Preliminary result            Hospital Course   89-year-old male with past history of hypertension, hyperlipidemia, PAD, CAD status post PCI, ischemic cardiomyopathy, chronic systolic CHF, pulmonary hypertension, type 2 diabetes, CKD 4, frequent falls, who presented to  the emergency department via EMS for recurrent fall.  Reported that he was ambulating to the bathroom when his leg gave out and he is unable to get off the floor.  He had some significant amount of time down and and initial ED evaluation is found to have acute kidney injury on CKD 4.  He was hyperkalemic and hypotensive.  Potassium was 6.9.  For this reason he was admitted to the ICU.  He was treated with IV fluids and hyperkalemia cocktail.  He was found to have elevated CK consistent with rhabdomyolysis.  Hyperkalemia and LESLIE gradually resolved and  IV fluids are discontinued as he had good oral intake.  He was able to transfer to the floor as his blood pressures improved with the IV fluids.  He was seen by nephrology who recommended 1 more week of oral bicarb for metabolic acidosis.  Recommend repeat BMP later this week to ensure stable kidney function.  He is nearly back to his baseline serum creatinine.  Due to recurrent falls and debility and the fact that he lives alone he was recommended for skilled nursing facility which has been arranged.  He remains otherwise hemodynamically stable and improved for discharge to skilled nursing today.  Greater than 30-minute spent discharge activities.    Pertinent Physical Exam At Time of Discharge  Physical Exam  GEN: healthy appearing, appears stated age, NAD  HEENT: NCAT, PERRLA, EOMI, moist mucous membranes  NECK: supple, no masses  CV: RRR, no m/r/g, no LE edema  LUNGS: CTAB, no w/r/c  ABD: soft, NT, ND, NBS  SKIN: no rashes  MSK; no gross deformities, normal joints  NEURO: A+Ox3, no FND  PSYCH: appropriate mood, affect    Outpatient Follow-Up  Future Appointments   Date Time Provider Department Center   2/21/2025 12:30 PM Juan Jose Dempsey MD [id number] [place]   3/11/2025  1:00 PM Galo Gaines MD [id number] [place]         Chon Arthur MD

## 2025-01-13 NOTE — PROGRESS NOTES
Renal Progress Note  Assessment:  89 y.o. male with history s/f dementia, HTN, HLD, CAD s/p PATY to LAD/LCX (2013), HFpEF, VT, pHTN, SND, loop recorder, T2DM, hypothyroidism, GERD, CKD stage III/IV, BPH, urethral cancer s/p LT nephrectomy, anxiety/MDD and frequent falls who presented for a fall.     LESLIE on CKD stage III/IV: has RT solitary kidney, multifactorial, most likely in setting of hypotension and rhabdomyolysis +/- volume depletion, on lasix as outpatient, states PO is ok but patient lives alone, CKD in setting of LT nephrectomy, T2DM, HTN, CHF, UA w/ bact, hematuria, hyaline casts, no proteinuria   Hypotension  Frequent falls   Metabolic acidosis: 2/2 renal failure   Hyperkalemia: in setting of LESLIE, rhabdomyolysis and outpatient K supplementation  Rhabdomyolysis   Hypoalbuminemia  Anemia of renal disease      Plan:  - function continues to improve  - continue PO bicarb  - agree w/ phos repletion         Subjective:   Admit Date: 1/9/2025    Interval History: function improving, CO2 also improving, phos trending down       Medications:   Scheduled Meds:cholecalciferol, 2,000 Units, oral, Daily  cilostazol, 50 mg, oral, BID  [Held by provider] citalopram, 20 mg, oral, Daily  clopidogrel, 75 mg, oral, Daily  [Transfer Hold] epoetin mychal or biosimilar, 100 Units/kg, subcutaneous, q7 days  heparin (porcine), 5,000 Units, subcutaneous, q8h  insulin lispro, 0-5 Units, subcutaneous, Before meals & nightly  levothyroxine, 50 mcg, oral, Once per day on Monday Tuesday Wednesday Thursday Friday Saturday  pantoprazole, 40 mg, oral, Daily before breakfast  potassium phosphate, 21 mmol, intravenous, Once  sodium bicarbonate, 650 mg, oral, TID  tamsulosin, 0.4 mg, oral, Daily      Continuous Infusions:lactated Ringer's, 50 mL/hr, Last Rate: 50 mL/hr (01/13/25 0845)        CBC:   Lab Results   Component Value Date    HGB 8.5 (L) 01/13/2025    HGB 8.8 (L) 01/12/2025    WBC 4.0 (L) 01/13/2025    WBC 3.8 (L) 01/12/2025    PLT  "132 (L) 01/13/2025     (L) 01/12/2025      Anemia:  No results found for: \"FERRITIN\", \"IRON\", \"TIBC\"   BMP:    Lab Results   Component Value Date     (L) 01/13/2025     (L) 01/12/2025    K 4.7 01/13/2025    K 4.7 01/12/2025     (H) 01/13/2025     (H) 01/12/2025    CO2 18 (L) 01/13/2025    CO2 16 (L) 01/12/2025    BUN 60 (H) 01/13/2025    BUN 59 (H) 01/12/2025    CREATININE 2.54 (H) 01/13/2025    CREATININE 3.02 (H) 01/12/2025      Bone disease:   Lab Results   Component Value Date    PHOS 1.8 (L) 01/13/2025    .3 (H) 01/11/2025    VITD25 9 (L) 01/11/2025      Urinalysis:    No results found for: \"KUN\", \"PROTUR\", \"GLUCOSEU\", \"BLOODU\", \"KETONESU\", \"BILIRUBINU\", \"NITRITEU\", \"LEUKOCYTESU\", \"UTPCR\"       Objective:   Vitals: BP (!) 100/47   Pulse 86   Temp 37.7 °C (99.9 °F)   Resp 18   Ht 1.727 m (5' 7.99\")   Wt 82.5 kg (181 lb 14.1 oz)   SpO2 92%   BMI 27.66 kg/m²    Wt Readings from Last 3 Encounters:   01/12/25 82.5 kg (181 lb 14.1 oz)   12/03/24 80 kg (176 lb 6.4 oz)   11/26/24 81.2 kg (179 lb)      24HR INTAKE/OUTPUT:    Intake/Output Summary (Last 24 hours) at 1/13/2025 1155  Last data filed at 1/13/2025 0602  Gross per 24 hour   Intake 479.17 ml   Output 400 ml   Net 79.17 ml     Admission weight:  Weight: 79.4 kg (175 lb)      General: alert, in no apparent distress  HEENT: normocephalic, atraumatic, anicteric  Lungs: non-labored respirations, clear to auscultation bilaterally  Heart: regular rate and rhythm, no murmurs or rubs  Abdomen: soft, non-tender, non-distended  Ext: no peripheral edema  Neuro: alert, follows commands        Electronically signed by Ava Villela MD on 1/13/2025 at 11:55 AM            "

## 2025-01-13 NOTE — PROGRESS NOTES
Physical Therapy    Physical Therapy Treatment    Patient Name: Cb Driver  MRN: [id number]  Department: [facility]  Room: [address] [place] [address]-  Today's Date: 1/13/2025  Time Calculation  Start Time: 0938  Stop Time: 0956  Time Calculation (min): 18 min     Assessment/Plan   PT Assessment  PT Assessment Results: Decreased strength, Decreased range of motion, Decreased endurance, Impaired balance, Decreased mobility, Decreased safety awareness, Impaired hearing  Rehab Prognosis: Good  Barriers to Discharge Home: Caregiver assistance, Physical needs  Evaluation/Treatment Tolerance: Patient limited by fatigue  End of Session Communication: Bedside nurse  Assessment Comment: Pt would benefit from continued therapy to improve ROM, strength, and functional mobility.  PT Plan  Inpatient/Swing Bed or Outpatient: Inpatient  PT Plan  Treatment/Interventions: Bed mobility, Transfer training, Gait training, Balance training, Strengthening, Therapeutic exercise, Home exercise program  PT Plan: Ongoing PT  PT Frequency: 3 times per week  PT Discharge Recommendations: Moderate intensity level of continued care  PT Recommended Transfer Status: Assist x2, Assistive device  PT - OK to Discharge: Once medically appropriate    General Visit Information:   PT  Visit  PT Received On: 01/13/25  Response to Previous Treatment: Patient with no complaints from previous session.  General  Reason for Referral: Impaired mobility  Referred By: PT reffered by Zak 1/9/25  Past Medical History Relevant to Rehab: HTN, Dementia, CKL, Anxiety, anemia, DM, GERD, hypothyroidism, PAD, Pulmonary HTN  Prior to Session Communication: Bedside nurse  Patient Position Received: Bed, 3 rail up, Alarm on  General Comment: Pt to ED 1/9 after a fall he sustained while walking to the bathroom. He was unable to get up. Friend, Lopez, came over to check on him when he found him on the floor. 1/9 (-) Flu A/B, (-) COVID, XR chest/pelvis (-), CT head/c-spine/abd/pelvis (-),  CT thoracic (-), CT lumbar (-) acute, (+) degenerative changes of L2-L3, L5-S1    Subjective   Precautions:  Precautions  Hearing/Visual Limitations: [place]  Medical Precautions: Fall precautions, Oxygen therapy device and L/min      Objective   Pain:  Pain Assessment  Pain Assessment: 0-10  0-10 (Numeric) Pain Score: 0 - No pain  Cognition:  Cognition  Orientation Level: Disoriented to time     Postural Control:  Static Sitting Balance  Static Sitting-Comment/Number of Minutes: Sat EOB for 2 minutes with B LE support and no UE support and CGA. Denies dizziness.  Dynamic Sitting Balance  Dynamic Sitting-Comments:  (CGA/min A cross body reaching to touch target. Needs assistance to go back to midline B.)    Activity Tolerance:  Activity Tolerance  Endurance:  (Decrease tolerance to upright activities d.t fatigue)  Treatments:  Therapeutic Exercise  Therapeutic Exercise Performed:  (In supine: B Ankle pumps x 15, Heel slides x 10 B, Abd pillow squeezes x 10, SAQ x 10 B with assistance. EOB: LAQ x 10 B)  B heel slides limited in AROM to 40-45 deg secondary to pain.    Balance/Neuromuscular Re-Education  Balance/Neuromuscular Re-Education Activity Performed:  (Reaching across body for PTs hand slightly outside DORA. 5 reps each side. CGA/MIN A to go back to midline.)    Bed Mobility  Bed Mobility:  Supine <> sit EOB: Mod A to advance trunk into sitting. Pt able to roll onto R side using BR and swing  B LE off bed. Needed Mod A to bring feet back onto bed. Pt able to decelerate trunk and lay back. Supine scoot toward HOB: Dependent x 2 with flat bed and blue pad    Transfers  Transfer:  (Not attempted at this time d/t PT/pt safety)    Outcome Measures:  [facility] Basic Mobility  Turning from your back to your side while in a flat bed without using bedrails: A lot  Moving from lying on your back to sitting on the side of a flat bed without using bedrails: A lot  Moving to and from bed to chair (including a wheelchair): A  lot  Standing up from a chair using your arms (e.g. wheelchair or bedside chair): A lot  To walk in hospital room: Total  Climbing 3-5 steps with railing: Total  Basic Mobility - Total Score: 10    OP EDUCATION:  Outpatient Education  Individual(s) Educated: Patient  Education Provided: Fall Risk, Home Exercise Program, POC  Risk and Benefits Discussed with Patient/Caregiver/Other: yes  Patient/Caregiver Demonstrated Understanding: yes  Plan of Care Discussed and Agreed Upon: yes  Patient Response to Education: Patient/Caregiver Verbalized Understanding of Information    Encounter Problems       Encounter Problems (Active)       PT Problem       Pt will completed supine <> sit bed mobility from flat bed with SBA  (Progressing)       Start:  01/10/25    Expected End:  01/24/25            Pt will demonstrate sit to stand transfers with WW + Min A  (Progressing)       Start:  01/10/25    Expected End:  01/24/25            Pt. will ambulate 30 with WW + Min A  (Not Progressing)       Start:  01/10/25    Expected End:  01/24/25            Pt will maintain balance while reaching outside DORA in sitting with B LE support and SBA  (Progressing)       Start:  01/10/25    Expected End:  01/24/25            Pt will complete B LE strengthening HEP Independently (Progressing)       Start:  01/10/25    Expected End:  01/24/25               Pain - Adult

## 2025-01-13 NOTE — PROGRESS NOTES
Occupational Therapy    Evaluation    Patient Name: Cb Driver  MRN: [id number]  Department: [facility]  Room: [facility]-  Today's Date: 1/13/2025  Time Calculation  Start Time: 1009  Stop Time: 1030  Time Calculation (min): 21 min      Assessment:  OT Assessment: Patient is limited by balance deficits, decreased strength and endurance which are limiting ability to complete ADLs and transfers.  Prognosis: Good  End of Session Communication: Bedside nurse  End of Session Patient Position: Bed, 3 rail up, Alarm on  OT Assessment Results: Decreased ADL status, Decreased endurance, Decreased functional mobility  Prognosis: Good  Plan:  Treatment Interventions: ADL retraining, Functional transfer training, Endurance training  OT Frequency: 2 times per week  OT Discharge Recommendations: Moderate intensity level of continued care  OT - OK to Discharge: Yes (Once medically appropriate.)  Treatment Interventions: ADL retraining, Functional transfer training, Endurance training    Subjective     General:  General  Reason for Referral: ADLs  Referred By: Zak WEISS  Past Medical History Relevant to Rehab: HTN, Dementia, Anxiety, CKD, Anxiety, Anemia, DM, GERD, Hypothyroidism,  Prior to Session Communication: Bedside nurse (Cleared for therapy evaluation by RN.)  Patient Position Received: Bed, 3 rail up, Alarm on (Alarm, stanley, O2.)  General Comment: Patient presented after a fall at home, found on the floor by friend. XR chest/pelvis (-), CT head/c-spine/abd/pelvis (-), CT thoracic (-), CT lumbar (-) acute, (+) degenerative changes of L2-L3, L5-S1. Dx: Fall.  Precautions:  Hearing/Visual Limitations: [place]  Medical Precautions: Fall precautions          Pain:  Pain Assessment  Pain Assessment: 0-10  0-10 (Numeric) Pain Score: 0 - No pain    Objective   Cognition:  Overall Cognitive Status: Within Functional Limits  Orientation Level: Disoriented to time           Home Living:  Home Living Comments: Patient lives alone in a 1  story house with 0 MARIC[address], no basement. Laundry on the main level. Tub shower with a shower seat and grab bars.  Prior Function:  Prior Function Comments: Patient ambulates short distances in the house at mod I level with a FWW, also uses a power scooter. Patient uses the scooter in the community. Independent with ADLs, requires assistance with IADLs. Patient has a home health aide x3 days/week - assist with IADLs. Reports falls in the past 3 months. Patient does not drive.     ADL:  Eating Assistance:  (Setup)  Grooming Assistance: Minimal  Bathing Assistance: Total  UE Dressing Assistance: Minimal  LE Dressing Assistance: Total  Toileting Assistance with Device: Total  Activity Tolerance:  Endurance: Decreased tolerance for upright activites  Bed Mobility/Transfers: Bed Mobility  Bed Mobility: Yes  Bed Mobility 1  Bed Mobility 1: Supine to sitting, Sitting to supine  Bed Mobility Comments 1: Supine to sit: max A x1 with HOB elevated and increased time/effort. Sit to supine: max A x2.    Transfers  Transfer: Yes  Transfer 1  Technique 1: Stand to sit, Sit to stand  Transfer Device 1:  (FWW)  Trials/Comments 1: Patient completed x2 sit <> stands at EOB. 1st stand: max A x1 with a FWW, poor standing balance and unable to take any steps. 2nd stand: Patient required mod A x2 for the sit <> stand and max A x2 with a FWW to side step at EOB. Cues for walker management.     Standing Balance:  Dynamic Standing Balance  Dynamic Standing-Comments: Poor      Strength:  Strength Comments: B/L UE elbow flex/ext and grasp 3 to 3+/5.     Extremities: RUE   RUE :  (R UE limited shoulder ROM, elbow flex/ext and digit flex/ext WFL.) and LUE   LUE:  (L UE limited shoulder ROM, elbow flex/ext and digit flex/ext WFL.)    Outcome Measures:[facility] Daily Activity  Putting on and taking off regular lower body clothing: Total  Bathing (including washing, rinsing, drying): Total  Putting on and taking off regular upper body clothing: A  little  Toileting, which includes using toilet, bedpan or urinal: Total  Taking care of personal grooming such as brushing teeth: A little  Eating Meals: A little  Daily Activity - Total Score: 12    Education Documentation  Body Mechanics, taught by Sonia Fontaine OT at 1/13/2025  2:39 PM.  Learner: Patient  Readiness: Acceptance  Method: Explanation  Response: Needs Reinforcement    EDUCATION:  Education  Individual(s) Educated: Patient  Education Provided: POC discussed and agreed upon, Risk and benefits of OT discussed with patient or other, Fall precautons  Patient Response to Education: Patient/Caregiver Verbalized Understanding of Information    Goals:  Encounter Problems       Encounter Problems (Active)       OT Goals       Patient will complete functional transfers with a FWW at min A level.  (Progressing)       Start:  01/13/25    Expected End:  01/27/25            Patient will demonstrate fair dynamic standing balance during functional tasks.  (Progressing)       Start:  01/13/25    Expected End:  01/27/25            Patient will complete lower body dressing at a min A level.  (Progressing)       Start:  01/13/25    Expected End:  01/27/25            Patient will be independent with B/L UE strengthening HEP. (Progressing)       Start:  01/13/25    Expected End:  01/27/25

## 2025-01-13 NOTE — PROGRESS NOTES
01/13/25 1102   Discharge Planning   Home or Post Acute Services Post acute facilities (Rehab/SNF/etc)   Type of Post Acute Facility Services Skilled nursing   Expected Discharge Disposition SNF     Called patient to discuss SNF choices--he states he has not spoke to [facility] yet but requested I call him to discuss. Called [facility]--states he just got to the hospital with his wife and will be heading to patient's room shortly. Requested this worker call him back in about an hour and they will have choices.     1225 Called patient and POA again--SNF choices are 1. [facility] and 2. [facility]. Referrals sent. Medically ready for dc per attending. LSW updated.     1245 Per attending, patient is ready for dc. Await orders to arrange transport,. PODALIA Marroquin notified    1544 Transport arranged for 1830-facility, nursing, patient, and his POA all aware.

## 2025-01-14 NOTE — CARE PLAN
The patient's goals for the shift include      The clinical goals for the shift include Safety

## 2025-01-19 LAB
ATRIAL RATE: 90 BPM
P AXIS: 82 DEGREES
P OFFSET: 119 MS
P ONSET: 93 MS
PR INTERVAL: 260 MS
Q ONSET: 223 MS
QRS COUNT: 15 BEATS
QRS DURATION: 68 MS
QT INTERVAL: 370 MS
QTC CALCULATION(BAZETT): 452 MS
QTC FREDERICIA: 423 MS
R AXIS: 4 DEGREES
T AXIS: 99 DEGREES
T OFFSET: 408 MS
VENTRICULAR RATE: 90 BPM

## 2025-02-07 ENCOUNTER — INPATIENT HOSPITAL (OUTPATIENT)
Dept: URBAN - METROPOLITAN AREA HOSPITAL 50 | Facility: HOSPITAL | Age: 89
End: 2025-02-07

## 2025-02-07 ENCOUNTER — HOSPITAL ENCOUNTER (OUTPATIENT)
Dept: CARDIOLOGY | Facility: HOSPITAL | Age: OVER 89
Discharge: HOME | End: 2025-02-07
Payer: MEDICARE

## 2025-02-07 DIAGNOSIS — Z95.818 STATUS POST PLACEMENT OF IMPLANTABLE LOOP RECORDER: ICD-10-CM

## 2025-02-07 DIAGNOSIS — D64.9 ANEMIA, UNSPECIFIED: ICD-10-CM

## 2025-02-07 DIAGNOSIS — R65.21 SEVERE SEPSIS WITH SEPTIC SHOCK: ICD-10-CM

## 2025-02-07 DIAGNOSIS — A41.9 SEPSIS, UNSPECIFIED ORGANISM: ICD-10-CM

## 2025-02-07 DIAGNOSIS — R13.10 DYSPHAGIA, UNSPECIFIED: ICD-10-CM

## 2025-02-07 PROCEDURE — 93298 REM INTERROG DEV EVAL SCRMS: CPT | Performed by: INTERNAL MEDICINE

## 2025-02-07 PROCEDURE — 93298 REM INTERROG DEV EVAL SCRMS: CPT

## 2025-02-07 PROCEDURE — 99222 1ST HOSP IP/OBS MODERATE 55: CPT | Performed by: INTERNAL MEDICINE

## 2025-02-10 ENCOUNTER — INPATIENT HOSPITAL (OUTPATIENT)
Dept: URBAN - METROPOLITAN AREA HOSPITAL 50 | Facility: HOSPITAL | Age: 89
End: 2025-02-10

## 2025-02-10 DIAGNOSIS — D64.9 ANEMIA, UNSPECIFIED: ICD-10-CM

## 2025-02-10 DIAGNOSIS — R65.21 SEVERE SEPSIS WITH SEPTIC SHOCK: ICD-10-CM

## 2025-02-10 DIAGNOSIS — A41.9 SEPSIS, UNSPECIFIED ORGANISM: ICD-10-CM

## 2025-02-10 DIAGNOSIS — R13.10 DYSPHAGIA, UNSPECIFIED: ICD-10-CM

## 2025-02-10 PROCEDURE — 99233 SBSQ HOSP IP/OBS HIGH 50: CPT | Mod: FS | Performed by: NURSE PRACTITIONER

## 2025-02-11 ENCOUNTER — INPATIENT HOSPITAL (OUTPATIENT)
Dept: URBAN - METROPOLITAN AREA HOSPITAL 50 | Facility: HOSPITAL | Age: 89
End: 2025-02-11

## 2025-02-11 DIAGNOSIS — R65.21 SEVERE SEPSIS WITH SEPTIC SHOCK: ICD-10-CM

## 2025-02-11 DIAGNOSIS — A41.9 SEPSIS, UNSPECIFIED ORGANISM: ICD-10-CM

## 2025-02-11 DIAGNOSIS — R13.10 DYSPHAGIA, UNSPECIFIED: ICD-10-CM

## 2025-02-11 DIAGNOSIS — D64.9 ANEMIA, UNSPECIFIED: ICD-10-CM

## 2025-02-11 PROCEDURE — 99233 SBSQ HOSP IP/OBS HIGH 50: CPT | Mod: FS | Performed by: NURSE PRACTITIONER

## 2025-02-21 ENCOUNTER — APPOINTMENT (OUTPATIENT)
Dept: PRIMARY CARE | Facility: CLINIC | Age: OVER 89
End: 2025-02-21
Payer: COMMERCIAL

## 2025-02-26 ENCOUNTER — TELEPHONE (OUTPATIENT)
Dept: PRIMARY CARE | Facility: CLINIC | Age: OVER 89
End: 2025-02-26

## 2025-03-11 ENCOUNTER — APPOINTMENT (OUTPATIENT)
Dept: CARDIOLOGY | Facility: CLINIC | Age: OVER 89
End: 2025-03-11
Payer: COMMERCIAL

## (undated) DEVICE — DRESSING, AQUACEL AG, HYDROFIBER W/SILVER, 3.5 X 3.75 IN